# Patient Record
Sex: MALE | Race: OTHER | URBAN - METROPOLITAN AREA
[De-identification: names, ages, dates, MRNs, and addresses within clinical notes are randomized per-mention and may not be internally consistent; named-entity substitution may affect disease eponyms.]

---

## 2024-07-14 ENCOUNTER — INPATIENT (INPATIENT)
Facility: HOSPITAL | Age: 49
LOS: 2 days | Discharge: HOME | DRG: 301 | End: 2024-07-17
Attending: STUDENT IN AN ORGANIZED HEALTH CARE EDUCATION/TRAINING PROGRAM | Admitting: THORACIC SURGERY (CARDIOTHORACIC VASCULAR SURGERY)
Payer: COMMERCIAL

## 2024-07-14 VITALS
SYSTOLIC BLOOD PRESSURE: 128 MMHG | RESPIRATION RATE: 18 BRPM | OXYGEN SATURATION: 97 % | DIASTOLIC BLOOD PRESSURE: 88 MMHG | HEIGHT: 78 IN | WEIGHT: 251.55 LBS | HEART RATE: 90 BPM | TEMPERATURE: 98 F

## 2024-07-14 DIAGNOSIS — I48.91 UNSPECIFIED ATRIAL FIBRILLATION: ICD-10-CM

## 2024-07-14 DIAGNOSIS — I72.8 ANEURYSM OF OTHER SPECIFIED ARTERIES: ICD-10-CM

## 2024-07-14 DIAGNOSIS — I77.810 THORACIC AORTIC ECTASIA: ICD-10-CM

## 2024-07-14 DIAGNOSIS — Z29.9 ENCOUNTER FOR PROPHYLACTIC MEASURES, UNSPECIFIED: ICD-10-CM

## 2024-07-14 LAB
A1C WITH ESTIMATED AVERAGE GLUCOSE RESULT: 6.4 % — HIGH (ref 4–5.6)
ALBUMIN SERPL ELPH-MCNC: 3.4 G/DL — SIGNIFICANT CHANGE UP (ref 3.3–5)
ALP SERPL-CCNC: 87 U/L — SIGNIFICANT CHANGE UP (ref 40–120)
ALT FLD-CCNC: 11 U/L — SIGNIFICANT CHANGE UP (ref 10–45)
ANION GAP SERPL CALC-SCNC: 11 MMOL/L — SIGNIFICANT CHANGE UP (ref 5–17)
APPEARANCE UR: CLEAR — SIGNIFICANT CHANGE UP
APTT BLD: 28.1 SEC — SIGNIFICANT CHANGE UP (ref 24.5–35.6)
APTT BLD: 31.3 SEC — SIGNIFICANT CHANGE UP (ref 24.5–35.6)
AST SERPL-CCNC: 13 U/L — SIGNIFICANT CHANGE UP (ref 10–40)
BACTERIA # UR AUTO: NEGATIVE /HPF — SIGNIFICANT CHANGE UP
BASOPHILS # BLD AUTO: 0.01 K/UL — SIGNIFICANT CHANGE UP (ref 0–0.2)
BASOPHILS NFR BLD AUTO: 0.3 % — SIGNIFICANT CHANGE UP (ref 0–2)
BILIRUB SERPL-MCNC: 0.7 MG/DL — SIGNIFICANT CHANGE UP (ref 0.2–1.2)
BILIRUB UR-MCNC: NEGATIVE — SIGNIFICANT CHANGE UP
BLD GP AB SCN SERPL QL: NEGATIVE — SIGNIFICANT CHANGE UP
BUN SERPL-MCNC: 11 MG/DL — SIGNIFICANT CHANGE UP (ref 7–23)
CALCIUM SERPL-MCNC: 9.3 MG/DL — SIGNIFICANT CHANGE UP (ref 8.4–10.5)
CAST: 0 /LPF — SIGNIFICANT CHANGE UP (ref 0–4)
CHLORIDE SERPL-SCNC: 106 MMOL/L — SIGNIFICANT CHANGE UP (ref 96–108)
CO2 SERPL-SCNC: 21 MMOL/L — LOW (ref 22–31)
COLOR SPEC: YELLOW — SIGNIFICANT CHANGE UP
CREAT SERPL-MCNC: 0.81 MG/DL — SIGNIFICANT CHANGE UP (ref 0.5–1.3)
DIFF PNL FLD: NEGATIVE — SIGNIFICANT CHANGE UP
EGFR: 110 ML/MIN/1.73M2 — SIGNIFICANT CHANGE UP
EOSINOPHIL # BLD AUTO: 0.1 K/UL — SIGNIFICANT CHANGE UP (ref 0–0.5)
EOSINOPHIL NFR BLD AUTO: 2.5 % — SIGNIFICANT CHANGE UP (ref 0–6)
ESTIMATED AVERAGE GLUCOSE: 137 MG/DL — HIGH (ref 68–114)
GLUCOSE SERPL-MCNC: 89 MG/DL — SIGNIFICANT CHANGE UP (ref 70–99)
GLUCOSE UR QL: 100 MG/DL
HCT VFR BLD CALC: 39.3 % — SIGNIFICANT CHANGE UP (ref 39–50)
HCT VFR BLD CALC: 40.8 % — SIGNIFICANT CHANGE UP (ref 39–50)
HGB BLD-MCNC: 13.2 G/DL — SIGNIFICANT CHANGE UP (ref 13–17)
HGB BLD-MCNC: 13.5 G/DL — SIGNIFICANT CHANGE UP (ref 13–17)
INR BLD: 1.09 RATIO — SIGNIFICANT CHANGE UP (ref 0.85–1.18)
KETONES UR-MCNC: NEGATIVE MG/DL — SIGNIFICANT CHANGE UP
LEUKOCYTE ESTERASE UR-ACNC: NEGATIVE — SIGNIFICANT CHANGE UP
LYMPHOCYTES # BLD AUTO: 2.01 K/UL — SIGNIFICANT CHANGE UP (ref 1–3.3)
LYMPHOCYTES # BLD AUTO: 51.1 % — HIGH (ref 13–44)
MCHC RBC-ENTMCNC: 25.3 PG — LOW (ref 27–34)
MCHC RBC-ENTMCNC: 25.5 PG — LOW (ref 27–34)
MCHC RBC-ENTMCNC: 33.1 GM/DL — SIGNIFICANT CHANGE UP (ref 32–36)
MCHC RBC-ENTMCNC: 33.6 GM/DL — SIGNIFICANT CHANGE UP (ref 32–36)
MCV RBC AUTO: 75.9 FL — LOW (ref 80–100)
MCV RBC AUTO: 76.4 FL — LOW (ref 80–100)
MONOCYTES # BLD AUTO: 0.46 K/UL — SIGNIFICANT CHANGE UP (ref 0–0.9)
MONOCYTES NFR BLD AUTO: 11.7 % — SIGNIFICANT CHANGE UP (ref 2–14)
MRSA PCR RESULT.: SIGNIFICANT CHANGE UP
NEUTROPHILS # BLD AUTO: 1.35 K/UL — LOW (ref 1.8–7.4)
NEUTROPHILS NFR BLD AUTO: 34.4 % — LOW (ref 43–77)
NITRITE UR-MCNC: NEGATIVE — SIGNIFICANT CHANGE UP
NRBC # BLD: 0 /100 WBCS — SIGNIFICANT CHANGE UP (ref 0–0)
NRBC # BLD: 0 /100 WBCS — SIGNIFICANT CHANGE UP (ref 0–0)
NT-PROBNP SERPL-SCNC: 382 PG/ML — HIGH (ref 0–300)
PH UR: 7 — SIGNIFICANT CHANGE UP (ref 5–8)
PLATELET # BLD AUTO: 168 K/UL — SIGNIFICANT CHANGE UP (ref 150–400)
PLATELET # BLD AUTO: SIGNIFICANT CHANGE UP K/UL (ref 150–400)
POTASSIUM SERPL-MCNC: 4.2 MMOL/L — SIGNIFICANT CHANGE UP (ref 3.5–5.3)
POTASSIUM SERPL-SCNC: 4.2 MMOL/L — SIGNIFICANT CHANGE UP (ref 3.5–5.3)
PROT SERPL-MCNC: 6.7 G/DL — SIGNIFICANT CHANGE UP (ref 6–8.3)
PROT UR-MCNC: 30 MG/DL
PROTHROM AB SERPL-ACNC: 12 SEC — SIGNIFICANT CHANGE UP (ref 9.5–13)
RBC # BLD: 5.18 M/UL — SIGNIFICANT CHANGE UP (ref 4.2–5.8)
RBC # BLD: 5.34 M/UL — SIGNIFICANT CHANGE UP (ref 4.2–5.8)
RBC # FLD: 14 % — SIGNIFICANT CHANGE UP (ref 10.3–14.5)
RBC # FLD: 14.2 % — SIGNIFICANT CHANGE UP (ref 10.3–14.5)
RBC CASTS # UR COMP ASSIST: 1 /HPF — SIGNIFICANT CHANGE UP (ref 0–4)
RH IG SCN BLD-IMP: POSITIVE — SIGNIFICANT CHANGE UP
S AUREUS DNA NOSE QL NAA+PROBE: SIGNIFICANT CHANGE UP
SODIUM SERPL-SCNC: 138 MMOL/L — SIGNIFICANT CHANGE UP (ref 135–145)
SP GR SPEC: 1.01 — SIGNIFICANT CHANGE UP (ref 1–1.03)
SQUAMOUS # UR AUTO: 2 /HPF — SIGNIFICANT CHANGE UP (ref 0–5)
TSH SERPL-MCNC: <0.01 UIU/ML — LOW (ref 0.27–4.2)
UROBILINOGEN FLD QL: 1 MG/DL — SIGNIFICANT CHANGE UP (ref 0.2–1)
WBC # BLD: 3.66 K/UL — LOW (ref 3.8–10.5)
WBC # BLD: 3.93 K/UL — SIGNIFICANT CHANGE UP (ref 3.8–10.5)
WBC # FLD AUTO: 3.66 K/UL — LOW (ref 3.8–10.5)
WBC # FLD AUTO: 3.93 K/UL — SIGNIFICANT CHANGE UP (ref 3.8–10.5)
WBC UR QL: 2 /HPF — SIGNIFICANT CHANGE UP (ref 0–5)

## 2024-07-14 PROCEDURE — 93010 ELECTROCARDIOGRAM REPORT: CPT

## 2024-07-14 PROCEDURE — 71046 X-RAY EXAM CHEST 2 VIEWS: CPT | Mod: 26

## 2024-07-14 PROCEDURE — ZZZZZ: CPT

## 2024-07-14 PROCEDURE — 99223 1ST HOSP IP/OBS HIGH 75: CPT

## 2024-07-14 PROCEDURE — 74174 CTA ABD&PLVS W/CONTRAST: CPT | Mod: 26

## 2024-07-14 PROCEDURE — 93306 TTE W/DOPPLER COMPLETE: CPT | Mod: 26

## 2024-07-14 PROCEDURE — 71275 CT ANGIOGRAPHY CHEST: CPT | Mod: 26

## 2024-07-14 RX ORDER — DIPHENHYDRAMINE HCL 12.5MG/5ML
25 ELIXIR ORAL EVERY 4 HOURS
Refills: 0 | Status: DISCONTINUED | OUTPATIENT
Start: 2024-07-14 | End: 2024-07-17

## 2024-07-14 RX ORDER — PANTOPRAZOLE SODIUM 40 MG/10ML
40 INJECTION, POWDER, FOR SOLUTION INTRAVENOUS
Refills: 0 | Status: DISCONTINUED | OUTPATIENT
Start: 2024-07-14 | End: 2024-07-17

## 2024-07-14 RX ORDER — SODIUM CHLORIDE 0.9 % (FLUSH) 0.9 %
3 SYRINGE (ML) INJECTION EVERY 8 HOURS
Refills: 0 | Status: DISCONTINUED | OUTPATIENT
Start: 2024-07-14 | End: 2024-07-17

## 2024-07-14 RX ORDER — HEPARIN SODIUM 50 [USP'U]/ML
800 INJECTION, SOLUTION INTRAVENOUS
Qty: 25000 | Refills: 0 | Status: DISCONTINUED | OUTPATIENT
Start: 2024-07-14 | End: 2024-07-17

## 2024-07-14 RX ORDER — METOPROLOL TARTRATE 50 MG
25 TABLET ORAL EVERY 12 HOURS
Refills: 0 | Status: DISCONTINUED | OUTPATIENT
Start: 2024-07-14 | End: 2024-07-17

## 2024-07-14 RX ORDER — HEPARIN SODIUM 50 [USP'U]/ML
5000 INJECTION, SOLUTION INTRAVENOUS EVERY 8 HOURS
Refills: 0 | Status: DISCONTINUED | OUTPATIENT
Start: 2024-07-14 | End: 2024-07-14

## 2024-07-14 RX ORDER — ASPIRIN 325 MG/1
81 TABLET, FILM COATED ORAL DAILY
Refills: 0 | Status: DISCONTINUED | OUTPATIENT
Start: 2024-07-14 | End: 2024-07-17

## 2024-07-14 RX ADMIN — HEPARIN SODIUM 8 UNIT(S)/HR: 50 INJECTION, SOLUTION INTRAVENOUS at 09:56

## 2024-07-14 RX ADMIN — Medication 25 MILLIGRAM(S): at 21:29

## 2024-07-14 RX ADMIN — Medication 3 MILLILITER(S): at 05:49

## 2024-07-14 RX ADMIN — PANTOPRAZOLE SODIUM 40 MILLIGRAM(S): 40 INJECTION, POWDER, FOR SOLUTION INTRAVENOUS at 05:52

## 2024-07-14 RX ADMIN — ASPIRIN 81 MILLIGRAM(S): 325 TABLET, FILM COATED ORAL at 09:56

## 2024-07-14 RX ADMIN — Medication 3 MILLILITER(S): at 09:31

## 2024-07-14 RX ADMIN — Medication 25 MILLIGRAM(S): at 06:39

## 2024-07-14 RX ADMIN — Medication 3 MILLILITER(S): at 21:51

## 2024-07-14 NOTE — PROGRESS NOTE ADULT - SUBJECTIVE AND OBJECTIVE BOX
Subjective " hello I am CP free"    VITAL SIGNS    Telemetry:  AFIB  70-80     Vital Signs Last 24 Hrs  T(C): 36.8 (24 @ 04:06), Max: 36.8 (24 @ 04:06)  T(F): 98.3 (24 @ 04:06), Max: 98.3 (24 @ 04:06)  HR: 90 (24 @ 04:06) (90 - 90)  BP: 128/88 (24 @ 04:06) (128/88 - 128/88)  RR: 18 (24 @ 04:06) (18 - 18)  SpO2: 97% (24 @ 04:06) (97% - 97%)            @ 07:01  -   @ 11:21  --------------------------------------------------------  IN: 136 mL / OUT: 600 mL / NET: -464 mL    Daily Height in cm: 198.12 (2024 04:06)    Daily Weight in k.1 (2024 04:06)    MEDICATIONS  (STANDING):  aspirin enteric coated 81 milliGRAM(s) Oral daily  heparin  Infusion 800 Unit(s)/Hr (8 mL/Hr) IV Continuous <Continuous>  metoprolol tartrate 25 milliGRAM(s) Oral every 12 hours  pantoprazole    Tablet 40 milliGRAM(s) Oral before breakfast  sodium chloride 0.9% lock flush 3 milliLiter(s) IV Push every 8 hours    MEDICATIONS  (PRN):                        13.5   3.93  )-----------( 168      ( 2024 06:35 )             40.8         138  |  106  |  11  ----------------------------<  89  4.2   |  21<L>  |  0.81    Ca    9.3      2024 06:31    TPro  6.7  /  Alb  3.4  /  TBili  0.7  /  DBili  x   /  AST  13  /  ALT  11  /  AlkPhos  87  07-14    < from: TTE W or WO Ultrasound Enhancing Agent (24 @ 06:47) >  1. Left ventricular cavity is normal in size. Left ventricular wall thickness is normal. Left ventricular systolic function is normal with an ejection fraction of 59 % by Nichole's method of disks. There are no regional wall motion abnormalities seen.   2. Aortic root at the sinuses of Valsalva is aneurysmal, measuring 5.10 cm (indexed 2.05 cm/m²). Ascending aorta diameter is aneurysmal, measuring 4.80 cm (indexed 1.93 cm/m²).   3. Trace aortic regurgitation.    __________________________________________________________    < end of copied text >                            PHYSICAL EXAM        Neurology: alert and oriented x 3, nonfocal, no gross deficits    CV : s1 IRRR  Lungs: B/l CTA on room air     Abdomen: soft, nontender, nondistended, positive bowel sounds,  + Bm     :voids               Extremities:   war4m well perfused equal strength throughout    B/ll e +DP no calf tenderness                                        Physical Therapy Rec:   Home  [  ]   Home w/ PT  [  ]  Rehab  [  ]    Discussed with Cardiothoracic Team at AM rounds. Subjective " hello I am CP free"    VITAL SIGNS    Telemetry:  AFIB  70-80     Vital Signs Last 24 Hrs  T(C): 36.8 (24 @ 04:06), Max: 36.8 (24 @ 04:06)  T(F): 98.3 (24 @ 04:06), Max: 98.3 (24 @ 04:06)  HR: 90 (24 @ 04:06) (90 - 90)  BP: 128/88 (24 @ 04:06) (128/88 - 128/88)  RR: 18 (24 @ 04:06) (18 - 18)  SpO2: 97% (24 @ 04:06) (97% - 97%)            @ 07:01  -   @ 11:21  --------------------------------------------------------  IN: 136 mL / OUT: 600 mL / NET: -464 mL    Daily Height in cm: 198.12 (2024 04:06)    Daily Weight in k.1 (2024 04:06)    MEDICATIONS  (STANDING):  aspirin enteric coated 81 milliGRAM(s) Oral daily  heparin  Infusion 800 Unit(s)/Hr (8 mL/Hr) IV Continuous <Continuous>  metoprolol tartrate 25 milliGRAM(s) Oral every 12 hours  pantoprazole    Tablet 40 milliGRAM(s) Oral before breakfast  sodium chloride 0.9% lock flush 3 milliLiter(s) IV Push every 8 hours    MEDICATIONS  (PRN):                        13.5   3.93  )-----------( 168      ( 2024 06:35 )             40.8         138  |  106  |  11  ----------------------------<  89  4.2   |  21<L>  |  0.81    Ca    9.3      2024 06:31    TPro  6.7  /  Alb  3.4  /  TBili  0.7  /  DBili  x   /  AST  13  /  ALT  11  /  AlkPhos  87  07-14    < from: TTE W or WO Ultrasound Enhancing Agent (24 @ 06:47) >  1. Left ventricular cavity is normal in size. Left ventricular wall thickness is normal. Left ventricular systolic function is normal with an ejection fraction of 59 % by Nichole's method of disks. There are no regional wall motion abnormalities seen.   2. Aortic root at the sinuses of Valsalva is aneurysmal, measuring 5.10 cm (indexed 2.05 cm/m²). Ascending aorta diameter is aneurysmal, measuring 4.80 cm (indexed 1.93 cm/m²).   3. Trace aortic regurgitation.    __________________________________________________________    < end of copied text >                            PHYSICAL EXAM        Neurology: alert and oriented x 3, nonfocal, no gross deficits    CV : s1 IRRR  Lungs: B/l CTA on room air     Abdomen: soft, nontender, nondistended, positive bowel sounds,  + Bm     :voids               Extremities:   warm well perfused equal strength throughout    B/ll e +DP no calf tenderness                                        Physical Therapy Rec:   Home  [  ]   Home w/ PT  [  ]  Rehab  [  ]    Discussed with Cardiothoracic Team at AM rounds.

## 2024-07-14 NOTE — H&P ADULT - ASSESSMENT
48 year old male with pmh of known aortic root dilatation who  presented to outside hospital for management of headache and  found to have aortic dilatation increased to 5.6 cm from 4.8 in 2019 and new onset atrial fibrillation.  Transferred for cardiac surgery evaluation.

## 2024-07-14 NOTE — PROGRESS NOTE ADULT - PROBLEM SELECTOR PLAN 1
admit to Dr Serrano   telemetry monitor  metorpolol 25 bid  cardiac surgery labs  ECHO- 2. Aortic root at the sinuses of Valsalva is aneurysmal, measuring 5.10 cm (indexed 2.05 cm/m²). Ascending aorta diameter is aneurysmal, measuring 4.80 cm   blood pressure management admit to Dr Serrano   telemetry monitor  metorpolol 25 bid  cardiac surgery labs  7/14 ECHO- 2. Aortic root at the sinuses of Valsalva is aneurysmal, measuring 5.10 cm (indexed 2.05 cm/m²). Ascending aorta diameter is aneurysmal, measuring 4.80 cm   7/14 CTA chest  completed  blood pressure management  OR date TBD

## 2024-07-14 NOTE — PATIENT PROFILE ADULT - NSPRESCRALCAMT_GEN_A_NUR
Chief Complaint   Patient presents with   • DDD degenerative disc disease     Refill on gabapentin and would like a steroid shot     Subjective   Juan Antonio Carrington is a 46 y.o. male.           Presents with low back pain and needing steroid injection     Back Pain  This is a recurrent problem. The current episode started more than 1 year ago. The problem occurs intermittently. The problem has been waxing and waning since onset. The pain is present in the sacro-iliac. The quality of the pain is described as cramping, shooting and stabbing. The pain radiates to the left foot, left thigh and left knee. The pain is at a severity of 8/10. The pain is severe. The pain is the same all the time. The symptoms are aggravated by bending and position. Stiffness is present all day. Associated symptoms include paresis and paresthesias. Pertinent negatives include no abdominal pain, bladder incontinence, bowel incontinence, chest pain, dysuria, fever, headaches, leg pain, numbness, pelvic pain, perianal numbness, tingling, weakness or weight loss. Risk factors include recent trauma and sedentary lifestyle. He has tried analgesics, heat, ice, NSAIDs, walking and home exercises for the symptoms. The treatment provided mild relief.   Fatigue  This is a recurrent problem. The current episode started 1 to 4 weeks ago. The problem occurs every several days. The problem has been gradually worsening. Associated symptoms include arthralgias, fatigue, joint swelling and myalgias. Pertinent negatives include no abdominal pain, chest pain, chills, coughing, fever, headaches, neck pain, numbness, rash, sore throat, swollen glands, urinary symptoms, vertigo, visual change or weakness. Treatments tried: testosterone and it helps  The treatment provided moderate relief.        The following portions of the patient's history were reviewed and updated as appropriate: allergies, current medications, past social history and problem list.    Review  "of Systems   Constitutional: Positive for activity change and fatigue. Negative for appetite change, chills, fever and weight loss.   HENT: Negative for drooling, ear discharge, ear pain, facial swelling, hearing loss and sore throat.    Eyes: Negative.  Negative for photophobia, pain, discharge, redness and visual disturbance.   Respiratory: Negative.  Negative for apnea, cough, shortness of breath, wheezing and stridor.    Cardiovascular: Negative for chest pain, palpitations and leg swelling.   Gastrointestinal: Negative for abdominal distention, abdominal pain, anal bleeding, blood in stool, bowel incontinence and constipation.   Endocrine: Negative.  Negative for cold intolerance, heat intolerance, polydipsia, polyphagia and polyuria.   Genitourinary: Negative.  Negative for bladder incontinence, difficulty urinating, dysuria and pelvic pain.   Musculoskeletal: Positive for arthralgias, back pain, gait problem, joint swelling and myalgias. Negative for neck pain and neck stiffness.        Right ankle pain , low back pain     Right ankle pain    Skin: Negative.  Negative for rash.   Allergic/Immunologic: Negative.  Negative for environmental allergies, food allergies and immunocompromised state.   Neurological: Positive for paresthesias. Negative for dizziness, vertigo, tingling, tremors, seizures, syncope, facial asymmetry, speech difficulty, weakness, light-headedness, numbness and headaches.   Hematological: Negative.  Negative for adenopathy. Does not bruise/bleed easily.   Psychiatric/Behavioral: Negative.  Negative for agitation, behavioral problems, confusion, decreased concentration, dysphoric mood and hallucinations. The patient is not hyperactive.        Objective   /80   Pulse 84   Ht 182.9 cm (72\")   Wt 99.8 kg (220 lb)   SpO2 98%   BMI 29.84 kg/m²   Physical Exam  Vitals and nursing note reviewed.   Constitutional:       General: He is not in acute distress.     Appearance: Normal " appearance. He is not ill-appearing, toxic-appearing or diaphoretic.   HENT:      Head: Normocephalic.      Right Ear: There is no impacted cerumen.      Left Ear: There is no impacted cerumen.      Nose: Nose normal. No congestion or rhinorrhea.      Mouth/Throat:      Mouth: Mucous membranes are moist.      Pharynx: No oropharyngeal exudate or posterior oropharyngeal erythema.   Eyes:      Pupils: Pupils are equal, round, and reactive to light.   Neck:      Vascular: No carotid bruit.   Cardiovascular:      Rate and Rhythm: Normal rate.      Heart sounds: No murmur heard.    No friction rub. No gallop.   Pulmonary:      Effort: Pulmonary effort is normal. No respiratory distress.      Breath sounds: No stridor. No wheezing, rhonchi or rales.   Chest:      Chest wall: No tenderness.   Abdominal:      General: Abdomen is flat. There is no distension.      Palpations: There is no mass.      Tenderness: There is no abdominal tenderness. There is no right CVA tenderness, left CVA tenderness, guarding or rebound.      Hernia: No hernia is present.   Musculoskeletal:         General: Tenderness present. No swelling, deformity or signs of injury. Normal range of motion.      Cervical back: Normal range of motion. No rigidity or tenderness.      Lumbar back: Spasms, tenderness and bony tenderness present.      Right lower leg: No edema.      Left lower leg: No edema.        Legs:    Lymphadenopathy:      Cervical: No cervical adenopathy.   Skin:     General: Skin is warm.      Coloration: Skin is not jaundiced or pale.      Findings: No bruising, erythema, lesion or rash.   Neurological:      General: No focal deficit present.      Mental Status: He is alert and oriented to person, place, and time.      Cranial Nerves: No cranial nerve deficit.      Sensory: No sensory deficit.      Motor: No weakness.      Coordination: Coordination normal.      Gait: Gait normal.      Deep Tendon Reflexes: Reflexes normal.   Psychiatric:          Mood and Affect: Mood normal.         Behavior: Behavior normal.              Assessment & Plan     Problems Addressed this Visit        Neuro    DDD (degenerative disc disease), lumbar - Primary    Lumbar radiculopathy    Relevant Medications    gabapentin (NEURONTIN) 300 MG capsule       Symptoms and Signs    Malaise and fatigue    Relevant Medications    gabapentin (NEURONTIN) 300 MG capsule      Diagnoses       Codes Comments    DDD (degenerative disc disease), lumbar    -  Primary ICD-10-CM: M51.36  ICD-9-CM: 722.52     Lumbar radiculopathy     ICD-10-CM: M54.16  ICD-9-CM: 724.4     Malaise and fatigue     ICD-10-CM: R53.81, R53.83  ICD-9-CM: 780.79            New Medications Ordered This Visit   Medications   • gabapentin (NEURONTIN) 300 MG capsule     Sig: Take 1 capsule by mouth 3 (Three) Times a Day.     Dispense:  90 capsule     Refill:  2     Current Outpatient Medications on File Prior to Visit   Medication Sig Dispense Refill   • baclofen (LIORESAL) 10 MG tablet Take 1 tablet by mouth 3 (Three) Times a Day. 90 tablet 2   • HYDROcodone-acetaminophen (NORCO) 7.5-325 MG per tablet Take 1 tablet by mouth Every 4 (Four) Hours As Needed for Moderate Pain. 24 tablet 0   • ibuprofen (ADVIL,MOTRIN) 800 MG tablet Take 1 tablet by mouth Every 8 (Eight) Hours As Needed for Moderate Pain . 90 tablet 5   • pantoprazole (Protonix) 40 MG EC tablet Take 1 tablet by mouth Daily. 30 tablet 11   • [DISCONTINUED] gabapentin (NEURONTIN) 300 MG capsule Take 1 capsule by mouth 3 (Three) Times a Day. 90 capsule 2   • [DISCONTINUED] HYDROcodone-acetaminophen (Norco)  MG per tablet Take 1 tablet by mouth Every 2 (Two) Hours As Needed for Moderate Pain . 180 tablet 0   • [DISCONTINUED] traMADol (ULTRAM) 50 MG tablet Take 1 tablet by mouth Every 6 (Six) Hours As Needed for Moderate Pain . 60 tablet 0     No current facility-administered medications on file prior to visit.       15 minutes   Follow Up   Return in about  3 months (around 9/9/2022) for Next scheduled follow up.     Patient understands the risks associated with this controlled medication, including tolerance and addiction.  he also agrees to only obtain this medication from me, and not from a another provider, unless that provider is covering for me in my absence.  he also agrees to be compliant in dosing, and not self adjust the dose of medication.  A signed controlled substance agreement is on file, and he has received a controlled substance education sheet at this a previous visit.  he has also signed a consent for treatment with a controlled substance as per [facility] policy. DAVID was obtained.     see back in 3 months, meds as directed, diet discussed kenalog today -continue other meds as directed   3 or 4

## 2024-07-14 NOTE — PROGRESS NOTE ADULT - ASSESSMENT
This is a 48 year old male with pmh of known aortic root dilatation who  presented to outside hospital for management of headache and  found to have aortic dilatation increased to 5.6 cm from 4.8 in 2019 and new onset atrial fibrillation.  Transferred for cardiac surgery evaluation.     7/14 VSS CP free in afib heparin gtt for AC echo completed this am       This is a 48 year old male with pmh of known aortic root dilatation who  presented to outside hospital for management of headache and  found to have aortic dilatation increased to 5.6 cm from 4.8 in 2019 and new onset atrial fibrillation.  Transferred for cardiac surgery evaluation.     7/14 VSS CP free in afib heparin gtt for AC echo/  CTA completed today  will need cardiac cath OR date TBD

## 2024-07-14 NOTE — H&P ADULT - HISTORY OF PRESENT ILLNESS
male with pmh of migraine presented to outside hospital for management of headache and incidentally found to have aortic dilatation to 5.6 cm and new onset atrial fibrillation.  Transferred to CTS service at [facility] for continued management.  Denies fever, chills, syncope, chest pain, abdominal pain, back pain. n/v/d, dysuria 48 year old male with pmh of known aortic root dilatation 4.8 with prior annual surveillance and family history of death due to aortic dissection and MI (mother age 45)  presented to outside hospital for management of headache and incidentally found to have aortic dilatation to 5.6 cm and new onset atrial fibrillation.  Transferred to CTS service at [facility] for continued management.  Denies fever, chills, syncope, chest pain, abdominal pain, back pain. n/v/d, dysuria

## 2024-07-14 NOTE — H&P ADULT - NSICDXFAMILYHX_GEN_ALL_CORE_FT
FAMILY HISTORY:  Mother  Still living? No  Family history of myocardial infarction, Age at diagnosis: Age Unknown    Uncle  Still living? No  Family history of aortic dissection, Age at diagnosis: Age Unknown

## 2024-07-14 NOTE — H&P ADULT - SOCIAL HISTORY: SUBSTANCE USE
During pre-op assessment pt c/o cough for over a month, SOB, breath sounds diminished. Pt concerned about success of surgery with his continued cough. Notified CRNA and MD prior to procedure. MD came to talk to patient and ok to proceed with surgery. Pt verbalized understanding and is okay with proceeding today.    Autumn Diaz    
daily marijuana

## 2024-07-14 NOTE — H&P ADULT - NSHPPHYSICALEXAM_GEN_ALL_CORE
General: WN/WD NAD  Neurology: A&Ox3, nonfocal, ORO x 4  Head:  Normocephalic, atraumatic  ENT:  Mucosa moist, no ulcerations, glasses  Neck:  Supple, no sinuses or palpable masses  Lymphatic:  No palpable cervical, supraclavicular, axillary or inguinal adenopathy  Respiratory: CTA B/L  CV: RRR, S1S2, no murmur  Abdominal: Soft, NT, ND no palpable mass  MSK: No edema, + peripheral pulses, FROM all 4 extremity  Skin: Warm dry intact  vascular access: peripheral IV

## 2024-07-15 DIAGNOSIS — E05.90 THYROTOXICOSIS, UNSPECIFIED WITHOUT THYROTOXIC CRISIS OR STORM: ICD-10-CM

## 2024-07-15 LAB
ALBUMIN SERPL ELPH-MCNC: 3.5 G/DL — SIGNIFICANT CHANGE UP (ref 3.3–5)
ALP SERPL-CCNC: 87 U/L — SIGNIFICANT CHANGE UP (ref 40–120)
ALT FLD-CCNC: 11 U/L — SIGNIFICANT CHANGE UP (ref 10–45)
ANION GAP SERPL CALC-SCNC: 8 MMOL/L — SIGNIFICANT CHANGE UP (ref 5–17)
APTT BLD: 45.4 SEC — HIGH (ref 24.5–35.6)
APTT BLD: 46.4 SEC — HIGH (ref 24.5–35.6)
AST SERPL-CCNC: 12 U/L — SIGNIFICANT CHANGE UP (ref 10–40)
BILIRUB SERPL-MCNC: 0.9 MG/DL — SIGNIFICANT CHANGE UP (ref 0.2–1.2)
BUN SERPL-MCNC: 10 MG/DL — SIGNIFICANT CHANGE UP (ref 7–23)
CALCIUM SERPL-MCNC: 9.1 MG/DL — SIGNIFICANT CHANGE UP (ref 8.4–10.5)
CHLORIDE SERPL-SCNC: 105 MMOL/L — SIGNIFICANT CHANGE UP (ref 96–108)
CO2 SERPL-SCNC: 26 MMOL/L — SIGNIFICANT CHANGE UP (ref 22–31)
CREAT SERPL-MCNC: 0.86 MG/DL — SIGNIFICANT CHANGE UP (ref 0.5–1.3)
EGFR: 108 ML/MIN/1.73M2 — SIGNIFICANT CHANGE UP
GLUCOSE SERPL-MCNC: 111 MG/DL — HIGH (ref 70–99)
HCT VFR BLD CALC: 41.1 % — SIGNIFICANT CHANGE UP (ref 39–50)
HGB BLD-MCNC: 13.6 G/DL — SIGNIFICANT CHANGE UP (ref 13–17)
INR BLD: 1.08 RATIO — SIGNIFICANT CHANGE UP (ref 0.85–1.18)
MCHC RBC-ENTMCNC: 24.9 PG — LOW (ref 27–34)
MCHC RBC-ENTMCNC: 33.1 GM/DL — SIGNIFICANT CHANGE UP (ref 32–36)
MCV RBC AUTO: 75.1 FL — LOW (ref 80–100)
NRBC # BLD: 0 /100 WBCS — SIGNIFICANT CHANGE UP (ref 0–0)
PLATELET # BLD AUTO: 163 K/UL — SIGNIFICANT CHANGE UP (ref 150–400)
POTASSIUM SERPL-MCNC: 4 MMOL/L — SIGNIFICANT CHANGE UP (ref 3.5–5.3)
POTASSIUM SERPL-SCNC: 4 MMOL/L — SIGNIFICANT CHANGE UP (ref 3.5–5.3)
PROT SERPL-MCNC: 6.6 G/DL — SIGNIFICANT CHANGE UP (ref 6–8.3)
PROTHROM AB SERPL-ACNC: 11.3 SEC — SIGNIFICANT CHANGE UP (ref 9.5–13)
RBC # BLD: 5.47 M/UL — SIGNIFICANT CHANGE UP (ref 4.2–5.8)
RBC # FLD: 14 % — SIGNIFICANT CHANGE UP (ref 10.3–14.5)
SODIUM SERPL-SCNC: 139 MMOL/L — SIGNIFICANT CHANGE UP (ref 135–145)
WBC # BLD: 4.55 K/UL — SIGNIFICANT CHANGE UP (ref 3.8–10.5)
WBC # FLD AUTO: 4.55 K/UL — SIGNIFICANT CHANGE UP (ref 3.8–10.5)

## 2024-07-15 PROCEDURE — 99232 SBSQ HOSP IP/OBS MODERATE 35: CPT

## 2024-07-15 RX ORDER — ACETAMINOPHEN 325 MG
1000 TABLET ORAL ONCE
Refills: 0 | Status: COMPLETED | OUTPATIENT
Start: 2024-07-15 | End: 2024-07-15

## 2024-07-15 RX ADMIN — Medication 3 MILLILITER(S): at 05:16

## 2024-07-15 RX ADMIN — PANTOPRAZOLE SODIUM 40 MILLIGRAM(S): 40 INJECTION, POWDER, FOR SOLUTION INTRAVENOUS at 05:20

## 2024-07-15 RX ADMIN — Medication 25 MILLIGRAM(S): at 21:39

## 2024-07-15 RX ADMIN — ASPIRIN 81 MILLIGRAM(S): 325 TABLET, FILM COATED ORAL at 10:53

## 2024-07-15 RX ADMIN — Medication 1000 MILLIGRAM(S): at 12:04

## 2024-07-15 RX ADMIN — Medication 400 MILLIGRAM(S): at 11:41

## 2024-07-15 RX ADMIN — HEPARIN SODIUM 10 UNIT(S)/HR: 50 INJECTION, SOLUTION INTRAVENOUS at 02:07

## 2024-07-15 RX ADMIN — Medication 3 MILLILITER(S): at 14:35

## 2024-07-15 RX ADMIN — HEPARIN SODIUM 10 UNIT(S)/HR: 50 INJECTION, SOLUTION INTRAVENOUS at 10:51

## 2024-07-15 RX ADMIN — Medication 25 MILLIGRAM(S): at 10:54

## 2024-07-15 RX ADMIN — Medication 3 MILLILITER(S): at 21:31

## 2024-07-15 NOTE — PROGRESS NOTE ADULT - SUBJECTIVE AND OBJECTIVE BOX
VITAL SIGNS    Telemetry:  AFib 50-80  Vital Signs Last 24 Hrs  T(C): 36.7 (07-15-24 @ 04:52), Max: 36.8 (24 @ 21:07)  T(F): 98.1 (07-15-24 @ 04:52), Max: 98.2 (24 @ 21:07)  HR: 87 (07-15-24 @ 10:10) (72 - 87)  BP: 115/69 (07-15-24 @ 10:10) (100/66 - 115/69)  RR: 18 (07-15-24 @ 10:10) (18 - 18)  SpO2: 96% (07-15-24 @ 10:10) (95% - 98%)             @ 07:01  -  07-15 @ 07:00  --------------------------------------------------------  IN: 1194 mL / OUT: 2950 mL / NET: -1756 mL    07-15 @ 07:01  -  07-15 @ 16:22  --------------------------------------------------------  IN: 360 mL / OUT: 1700 mL / NET: -1340 mL      Daily Weight in k (15 Jul 2024 11:05)  Admit Wt: Drug Dosing Weight  Height (cm): 198.1 (2024 04:06)  Weight (kg): 114.1 (2024 04:06)  BMI (kg/m2): 29.1 (2024 04:06)  BSA (m2): 2.49 (2024 04:06)    Bilirubin Total: 0.9 mg/dL (07-15 @ 01:39)    CAPILLARY BLOOD GLUCOSE              MEDICATIONS  aspirin enteric coated 81 milliGRAM(s) Oral daily  diphenhydrAMINE 25 milliGRAM(s) Oral every 4 hours PRN  heparin  Infusion 800 Unit(s)/Hr IV Continuous <Continuous>  metoprolol tartrate 25 milliGRAM(s) Oral every 12 hours  pantoprazole    Tablet 40 milliGRAM(s) Oral before breakfast  sodium chloride 0.9% lock flush 3 milliLiter(s) IV Push every 8 hours      >>> <<<  PHYSICAL EXAM  Subjective: c/o of headache and right upper back pain  Neurology: alert and oriented x 3, nonfocal, no gross deficits  CV :s1s2 irreg  Lungs: CTA  Abdomen: soft, NT,ND, (+ )BM  :  voiding  Extremities:  -c/c/e   right interscapular pain, tenderness to rhomboid     LABS  07-15    139  |  105  |  10  ----------------------------<  111<H>  4.0   |  26  |  0.86    Ca    9.1      15 Jul 2024 01:39    TPro  6.6  /  Alb  3.5  /  TBili  0.9  /  DBili  x   /  AST  12  /  ALT  11  /  AlkPhos  87  07-15                                 13.6   4.55  )-----------( 163      ( 15 Jul 2024 01:39 )             41.1          PT/INR - ( 15 Jul 2024 01:39 )   PT: 11.3 sec;   INR: 1.08 ratio         PTT - ( 15 Jul 2024 09:53 )  PTT:45.4 sec       PAST MEDICAL & SURGICAL HISTORY:  Dilated aortic root      Marijuana use      No significant past surgical history

## 2024-07-15 NOTE — PROGRESS NOTE ADULT - ASSESSMENT
This is a 48 year old male with pmh of known aortic root dilatation who  presented to outside hospital for management of headache and  found to have aortic dilatation increased to 5.6 cm from 4.8 in 2019 and new onset atrial fibrillation.  Transferred for cardiac surgery evaluation.     7/14 VSS CP free in afib heparin gtt for AC echo/  CTA completed today  will need cardiac cath OR date TBD  7/15 CT CORS Ao root gated coronal and sagitals to be reconstructed off of gated CT pending. Endocrinology consulted for Graves Disease work up. Ultrasound thyroid. MRA head to evaluate patient complaint of HA and vision changes. Heparin ggt for afib  Coronary cath deferred for now. BP controlled

## 2024-07-15 NOTE — CONSULT NOTE ADULT - NS ATTEND AMEND GEN_ALL_CORE FT
Chart, labs, vitals, radiology reviewed. Above H&P reviewed and edited where appropriate. Agree with history and physical exam. Agree with assessment and plan. I reviewed the overnight course of events and discussed the care with the patient/ family. All the decisions in assessment and plan are made by me

## 2024-07-15 NOTE — CONSULT NOTE ADULT - ASSESSMENT
48 year old male with pmh of known aortic root dilatation who  presented to outside hospital for management of headache and  found to have aortic dilatation increased to 5.6 cm from 4.8 in 2019 and new onset atrial fibrillation.  Found to have suppressed TSH levels     Assessment  Hyperthyroidism: TSH suppressed, having tachycardia, no thyroid dz history, Full TFT panel pending Thyroid AB pending.    Aortic root dilation: on medications, CT surgery eval, monitored.  HTN: on antihypertensive medications, monitored, asymptomatic.      Discussed plan and management wit Dr Tammy Munoz MD  Cell: [phone]  Office: [phone]                48 year old male with pmh of known aortic root dilatation who  presented to outside hospital for management of headache and  found to have aortic dilatation increased to 5.6 cm from 4.8 in 2019 and new onset atrial fibrillation.  Found to have suppressed TSH levels       Assessment  Hyperthyroidism: TSH suppressed, having tachycardia, no thyroid dz history, Full TFT panel pending Thyroid AB pending.    Aortic root dilation: on medications, CT surgery eval, monitored.  HTN: on antihypertensive medications, monitored, asymptomatic.      Discussed plan and management wit Dr Tammy Munoz MD  Cell: [phone]  Office: [phone]

## 2024-07-15 NOTE — PROGRESS NOTE ADULT - PROBLEM SELECTOR PLAN 1
admit to Dr Serrano   telemetry monitor  metorpolol 25 bid  cardiac surgery labs  7/14 ECHO- 2. Aortic root at the sinuses of Valsalva is aneurysmal, measuring 5.10 cm (indexed 2.05 cm/m²). Ascending aorta diameter is aneurysmal, measuring 4.80 cm   7/14 CTA chest  completed  blood pressure management  OR date TBD

## 2024-07-15 NOTE — CONSULT NOTE ADULT - PROBLEM SELECTOR RECOMMENDATION 9
Will get full thyroid panel  Thyroid US  Thyroid AB pending   Suggest to repeat thyroid function test in 4-6 weeks. Outpatient follow up.

## 2024-07-15 NOTE — CONSULT NOTE ADULT - SUBJECTIVE AND OBJECTIVE BOX
HPI:  48 year old male with pmh of known aortic root dilatation 4.8 with prior annual surveillance and family history of death due to aortic dissection and MI (mother age 45)  presented to outside hospital for management of headache and incidentally found to have aortic dilatation to 5.6 cm and new onset atrial fibrillation.  Transferred to CTS service at [facility] for continued management.  Denies fever, chills, syncope, chest pain, abdominal pain, back pain. n/v/d, dysuria (14 Jul 2024 04:13)      Endo was consulted for thyroid disorder       PAST MEDICAL & SURGICAL HISTORY:  Dilated aortic root      Marijuana use      No significant past surgical history          FAMILY HISTORY:  Family history of myocardial infarction (Mother)    Family history of aortic dissection (Uncle)        Social History:  Engaged  Employed: 2 jobs [employer]   domiciled: just purchased new home (14 Jul 2024 04:13)            HOME MEDICATIONS:  Home Medications:  Toprol-XL 25 mg oral tablet, extended release: 1 tab(s) orally once a day (14 Jul 2024 05:31)            MEDICATIONS  (STANDING):  aspirin enteric coated 81 milliGRAM(s) Oral daily  heparin  Infusion 800 Unit(s)/Hr (10 mL/Hr) IV Continuous <Continuous>  metoprolol tartrate 25 milliGRAM(s) Oral every 12 hours  pantoprazole    Tablet 40 milliGRAM(s) Oral before breakfast  sodium chloride 0.9% lock flush 3 milliLiter(s) IV Push every 8 hours    MEDICATIONS  (PRN):  diphenhydrAMINE 25 milliGRAM(s) Oral every 4 hours PRN Rash and/or Itching      Allergies    No Known Drug Allergies  shellfish (Anaphylaxis)    Intolerances        Review of Systems:  Neuro: No HA, no dizziness  Cardiovascular: No chest pain, no palpitations  Respiratory: no SOB, no cough  GI: No nausea, vomiting, abdominal pain  MSK: Denies joint/muscle pain      ALL OTHER SYSTEMS REVIEWED AND NEGATIVE        PHYSICAL EXAM:  VITALS: T(C): 36.7 (07-15-24 @ 04:52)  T(F): 98.1 (07-15-24 @ 04:52), Max: 98.2 (07-14-24 @ 21:07)  HR: 87 (07-15-24 @ 10:10) (72 - 87)  BP: 115/69 (07-15-24 @ 10:10) (100/66 - 115/69)  RR:  (18 - 18)  SpO2:  (95% - 98%)  Wt(kg): --  GENERAL: NAD, well-groomed, well-developed  NEURO:  alert and oriented  RESPIRATORY: Clear to auscultation bilaterally; No rales, rhonchi, wheezing  CARDIOVASCULAR: Si S2  GI: Soft, non distended, normal bowel sounds  MUSCULOSKELETAL: Moves all extremities equally                                 13.6   4.55  )-----------( 163      ( 15 Jul 2024 01:39 )             41.1       07-15    139  |  105  |  10  ----------------------------<  111<H>  4.0   |  26  |  0.86    eGFR: 108    Ca    9.1      07-15    TPro  6.6  /  Alb  3.5  /  TBili  0.9  /  DBili  x   /  AST  12  /  ALT  11  /  AlkPhos  87  07-15      Thyroid Function Tests:  07-14 @ 06:33 TSH <0.01 FreeT4 -- T3 -- Anti TPO -- Anti Thyroglobulin Ab -- TSI --    Diet, DASH/TLC:   Sodium & Cholesterol Restricted  No Beef  No Pork  No Shellfish     Special Instructions for Nursing:  Sodium & Cholesterol Restricted (07-14-24 @ 17:05) [Active]          A1C with Estimated Average Glucose Result: 6.4 % (07-14-24 @ 06:35)                   HPI:  48 year old male with pmh of known aortic root dilatation 4.8 with prior annual surveillance and family history of death due to aortic dissection and MI (mother age 45)  presented to outside hospital for management of headache and incidentally found to have aortic dilatation to 5.6 cm and new onset atrial fibrillation.  Transferred to CTS service at [facility] for continued management.  Denies fever, chills, syncope, chest pain, abdominal pain, back pain. n/v/d, dysuria (14 Jul 2024 04:13)      Endo was consulted for thyroid disorder       PAST MEDICAL & SURGICAL HISTORY:  Dilated aortic root      Marijuana use      No significant past surgical history          FAMILY HISTORY:  Family history of myocardial infarction (Mother)    Family history of aortic dissection (Uncle)        Social History:  Engaged  Employed: 2 jobs [employer]   domiciled: just purchased new home (14 Jul 2024 04:13)            HOME MEDICATIONS:  Home Medications:  Toprol-XL 25 mg oral tablet, extended release: 1 tab(s) orally once a day (14 Jul 2024 05:31)            MEDICATIONS  (STANDING):  aspirin enteric coated 81 milliGRAM(s) Oral daily  heparin  Infusion 800 Unit(s)/Hr (10 mL/Hr) IV Continuous <Continuous>  metoprolol tartrate 25 milliGRAM(s) Oral every 12 hours  pantoprazole    Tablet 40 milliGRAM(s) Oral before breakfast  sodium chloride 0.9% lock flush 3 milliLiter(s) IV Push every 8 hours    MEDICATIONS  (PRN):  diphenhydrAMINE 25 milliGRAM(s) Oral every 4 hours PRN Rash and/or Itching      Allergies    No Known Drug Allergies  shellfish (Anaphylaxis)    Intolerances        Review of Systems:  Neuro: No HA, no dizziness  Cardiovascular: No chest pain, no palpitations  Respiratory: no SOB, no cough  GI: No nausea, vomiting, abdominal pain  MSK: Denies joint/muscle pain      ALL OTHER SYSTEMS REVIEWED AND NEGATIVE        PHYSICAL EXAM:  VITALS: T(C): 36.7 (07-15-24 @ 04:52)  T(F): 98.1 (07-15-24 @ 04:52), Max: 98.2 (07-14-24 @ 21:07)  HR: 87 (07-15-24 @ 10:10) (72 - 87)  BP: 115/69 (07-15-24 @ 10:10) (100/66 - 115/69)  RR:  (18 - 18)  SpO2:  (95% - 98%)  Wt(kg): --  GENERAL: NAD, well-groomed, well-developed  NEURO:  alert and oriented  RESPIRATORY: Clear to auscultation bilaterally; No rales, rhonchi, wheezing  CARDIOVASCULAR: Si S2  GI: Soft, non distended, normal bowel sounds  MUSCULOSKELETAL: Moves all extremities equally                                 13.6   4.55  )-----------( 163      ( 15 Jul 2024 01:39 )             41.1       07-15    139  |  105  |  10  ----------------------------<  111<H>  4.0   |  26  |  0.86    eGFR: 108    Ca    9.1      07-15    TPro  6.6  /  Alb  3.5  /  TBili  0.9  /  DBili  x   /  AST  12  /  ALT  11  /  AlkPhos  87  07-15      Thyroid Function Tests:  07-14 @ 06:33 TSH <0.01 FreeT4 -- T3 -- Anti TPO -- Anti Thyroglobulin Ab -- TSI --    Diet, DASH/TLC:   Sodium & Cholesterol Restricted  No Beef  No Pork  No Shellfish     Special Instructions for Nursing:  Sodium & Cholesterol Restricted (07-14-24 @ 17:05) [Active]          A1C with Estimated Average Glucose Result: 6.4 % (07-14-24 @ 06:35)

## 2024-07-16 DIAGNOSIS — Z87.898 PERSONAL HISTORY OF OTHER SPECIFIED CONDITIONS: ICD-10-CM

## 2024-07-16 LAB
ALBUMIN SERPL ELPH-MCNC: 3.6 G/DL — SIGNIFICANT CHANGE UP (ref 3.3–5)
ALP SERPL-CCNC: 92 U/L — SIGNIFICANT CHANGE UP (ref 40–120)
ALT FLD-CCNC: 10 U/L — SIGNIFICANT CHANGE UP (ref 10–45)
ANION GAP SERPL CALC-SCNC: 11 MMOL/L — SIGNIFICANT CHANGE UP (ref 5–17)
APTT BLD: 32.7 SEC — SIGNIFICANT CHANGE UP (ref 24.5–35.6)
APTT BLD: 34.3 SEC — SIGNIFICANT CHANGE UP (ref 24.5–35.6)
AST SERPL-CCNC: 12 U/L — SIGNIFICANT CHANGE UP (ref 10–40)
BILIRUB SERPL-MCNC: 0.5 MG/DL — SIGNIFICANT CHANGE UP (ref 0.2–1.2)
BUN SERPL-MCNC: 11 MG/DL — SIGNIFICANT CHANGE UP (ref 7–23)
CALCIUM SERPL-MCNC: 9.1 MG/DL — SIGNIFICANT CHANGE UP (ref 8.4–10.5)
CHLORIDE SERPL-SCNC: 105 MMOL/L — SIGNIFICANT CHANGE UP (ref 96–108)
CO2 SERPL-SCNC: 23 MMOL/L — SIGNIFICANT CHANGE UP (ref 22–31)
CREAT SERPL-MCNC: 0.76 MG/DL — SIGNIFICANT CHANGE UP (ref 0.5–1.3)
EGFR: 112 ML/MIN/1.73M2 — SIGNIFICANT CHANGE UP
GLUCOSE SERPL-MCNC: 99 MG/DL — SIGNIFICANT CHANGE UP (ref 70–99)
HCT VFR BLD CALC: 44.6 % — SIGNIFICANT CHANGE UP (ref 39–50)
HGB BLD-MCNC: 14.6 G/DL — SIGNIFICANT CHANGE UP (ref 13–17)
INR BLD: 1.03 RATIO — SIGNIFICANT CHANGE UP (ref 0.85–1.18)
MCHC RBC-ENTMCNC: 25.1 PG — LOW (ref 27–34)
MCHC RBC-ENTMCNC: 32.7 GM/DL — SIGNIFICANT CHANGE UP (ref 32–36)
MCV RBC AUTO: 76.6 FL — LOW (ref 80–100)
NRBC # BLD: 0 /100 WBCS — SIGNIFICANT CHANGE UP (ref 0–0)
PLATELET # BLD AUTO: 169 K/UL — SIGNIFICANT CHANGE UP (ref 150–400)
POTASSIUM SERPL-MCNC: 4 MMOL/L — SIGNIFICANT CHANGE UP (ref 3.5–5.3)
POTASSIUM SERPL-SCNC: 4 MMOL/L — SIGNIFICANT CHANGE UP (ref 3.5–5.3)
PROT SERPL-MCNC: 7 G/DL — SIGNIFICANT CHANGE UP (ref 6–8.3)
PROTHROM AB SERPL-ACNC: 11.3 SEC — SIGNIFICANT CHANGE UP (ref 9.5–13)
RBC # BLD: 5.82 M/UL — HIGH (ref 4.2–5.8)
RBC # FLD: 14.1 % — SIGNIFICANT CHANGE UP (ref 10.3–14.5)
SODIUM SERPL-SCNC: 139 MMOL/L — SIGNIFICANT CHANGE UP (ref 135–145)
T4 FREE SERPL-MCNC: 1.5 NG/DL — SIGNIFICANT CHANGE UP (ref 0.9–1.8)
THYROPEROXIDASE AB SERPL-ACNC: <10 IU/ML — SIGNIFICANT CHANGE UP
WBC # BLD: 4.66 K/UL — SIGNIFICANT CHANGE UP (ref 3.8–10.5)
WBC # FLD AUTO: 4.66 K/UL — SIGNIFICANT CHANGE UP (ref 3.8–10.5)

## 2024-07-16 PROCEDURE — 99232 SBSQ HOSP IP/OBS MODERATE 35: CPT

## 2024-07-16 PROCEDURE — 75574 CT ANGIO HRT W/3D IMAGE: CPT | Mod: 26

## 2024-07-16 PROCEDURE — 70450 CT HEAD/BRAIN W/O DYE: CPT | Mod: 26

## 2024-07-16 PROCEDURE — 76536 US EXAM OF HEAD AND NECK: CPT | Mod: 26

## 2024-07-16 RX ORDER — METOPROLOL TARTRATE 50 MG
25 TABLET ORAL ONCE
Refills: 0 | Status: COMPLETED | OUTPATIENT
Start: 2024-07-16 | End: 2024-07-16

## 2024-07-16 RX ORDER — METOPROLOL TARTRATE 50 MG
5 TABLET ORAL ONCE
Refills: 0 | Status: COMPLETED | OUTPATIENT
Start: 2024-07-16 | End: 2024-07-16

## 2024-07-16 RX ORDER — AMIODARONE HYDROCHLORIDE 50 MG/ML
INJECTION, SOLUTION INTRAVENOUS
Refills: 0 | Status: DISCONTINUED | OUTPATIENT
Start: 2024-07-16 | End: 2024-07-17

## 2024-07-16 RX ORDER — AMIODARONE HYDROCHLORIDE 50 MG/ML
400 INJECTION, SOLUTION INTRAVENOUS EVERY 8 HOURS
Refills: 0 | Status: DISCONTINUED | OUTPATIENT
Start: 2024-07-16 | End: 2024-07-17

## 2024-07-16 RX ORDER — ACETAMINOPHEN 325 MG
650 TABLET ORAL EVERY 6 HOURS
Refills: 0 | Status: DISCONTINUED | OUTPATIENT
Start: 2024-07-16 | End: 2024-07-17

## 2024-07-16 RX ADMIN — AMIODARONE HYDROCHLORIDE 400 MILLIGRAM(S): 50 INJECTION, SOLUTION INTRAVENOUS at 21:02

## 2024-07-16 RX ADMIN — PANTOPRAZOLE SODIUM 40 MILLIGRAM(S): 40 INJECTION, POWDER, FOR SOLUTION INTRAVENOUS at 05:20

## 2024-07-16 RX ADMIN — Medication 25 MILLIGRAM(S): at 13:08

## 2024-07-16 RX ADMIN — Medication 25 MILLIGRAM(S): at 09:47

## 2024-07-16 RX ADMIN — AMIODARONE HYDROCHLORIDE 400 MILLIGRAM(S): 50 INJECTION, SOLUTION INTRAVENOUS at 09:47

## 2024-07-16 RX ADMIN — Medication 5 MILLIGRAM(S): at 15:05

## 2024-07-16 RX ADMIN — Medication 650 MILLIGRAM(S): at 16:54

## 2024-07-16 RX ADMIN — Medication 3 MILLILITER(S): at 05:05

## 2024-07-16 RX ADMIN — Medication 650 MILLIGRAM(S): at 17:24

## 2024-07-16 RX ADMIN — ASPIRIN 81 MILLIGRAM(S): 325 TABLET, FILM COATED ORAL at 09:48

## 2024-07-16 RX ADMIN — Medication 3 MILLILITER(S): at 19:54

## 2024-07-16 RX ADMIN — Medication 25 MILLIGRAM(S): at 21:03

## 2024-07-16 RX ADMIN — Medication 3 MILLILITER(S): at 10:39

## 2024-07-16 NOTE — PROGRESS NOTE ADULT - SUBJECTIVE AND OBJECTIVE BOX
Chief complaint  Patient is a 48y old  Male who presents with a chief complaint of cardiac surgery evaluation (16 Jul 2024 09:47)         Labs and Fingersticks  CAPILLARY BLOOD GLUCOSE          Anion Gap: 11 (07-16 @ 05:51)  Anion Gap: 8 (07-15 @ 01:39)      Calcium: 9.1 (07-16 @ 05:51)  Calcium: 9.1 (07-15 @ 01:39)  Albumin: 3.6 (07-16 @ 05:51)  Albumin: 3.5 (07-15 @ 01:39)    Alanine Aminotransferase (ALT/SGPT): 10 (07-16 @ 05:51)  Alanine Aminotransferase (ALT/SGPT): 11 (07-15 @ 01:39)  Alkaline Phosphatase: 92 (07-16 @ 05:51)  Alkaline Phosphatase: 87 (07-15 @ 01:39)  Aspartate Aminotransferase (AST/SGOT): 12 (07-16 @ 05:51)  Aspartate Aminotransferase (AST/SGOT): 12 (07-15 @ 01:39)        07-16    139  |  105  |  11  ----------------------------<  99  4.0   |  23  |  0.76    Ca    9.1      16 Jul 2024 05:51    TPro  7.0  /  Alb  3.6  /  TBili  0.5  /  DBili  x   /  AST  12  /  ALT  10  /  AlkPhos  92  07-16                        14.6   4.66  )-----------( 169      ( 16 Jul 2024 05:51 )             44.6     Medications  MEDICATIONS  (STANDING):  aMIOdarone    Tablet   Oral   aMIOdarone    Tablet 400 milliGRAM(s) Oral every 8 hours  aspirin enteric coated 81 milliGRAM(s) Oral daily  heparin  Infusion 800 Unit(s)/Hr (10 mL/Hr) IV Continuous <Continuous>  metoprolol tartrate 25 milliGRAM(s) Oral every 12 hours  pantoprazole    Tablet 40 milliGRAM(s) Oral before breakfast  sodium chloride 0.9% lock flush 3 milliLiter(s) IV Push every 8 hours      Physical Exam  General: Patient comfortable in bed   Vital Signs Last 12 Hrs  T(F): 98.1 (07-16-24 @ 04:43), Max: 98.1 (07-16-24 @ 04:43)  HR: 86 (07-16-24 @ 12:55) (76 - 90)  BP: 123/73 (07-16-24 @ 12:55) (123/73 - 137/86)  BP(mean): 90 (07-16-24 @ 12:55) (90 - 95)  RR: 18 (07-16-24 @ 12:55) (18 - 18)  SpO2: 96% (07-16-24 @ 12:55) (96% - 96%)    CVS: S1S2   Respiratory: No wheezing, no crepitations  GI: Abdomen soft, bowel sounds positive  Musculoskeletal:  moves all extremities  : Voiding        Chief complaint  Patient is a 48y old  Male who presents with a chief complaint of cardiac surgery evaluation (16 Jul 2024 09:47)         Labs and Fingersticks  CAPILLARY BLOOD GLUCOSE          Anion Gap: 11 (07-16 @ 05:51)  Anion Gap: 8 (07-15 @ 01:39)      Calcium: 9.1 (07-16 @ 05:51)  Calcium: 9.1 (07-15 @ 01:39)  Albumin: 3.6 (07-16 @ 05:51)  Albumin: 3.5 (07-15 @ 01:39)    Alanine Aminotransferase (ALT/SGPT): 10 (07-16 @ 05:51)  Alanine Aminotransferase (ALT/SGPT): 11 (07-15 @ 01:39)  Alkaline Phosphatase: 92 (07-16 @ 05:51)  Alkaline Phosphatase: 87 (07-15 @ 01:39)  Aspartate Aminotransferase (AST/SGOT): 12 (07-16 @ 05:51)  Aspartate Aminotransferase (AST/SGOT): 12 (07-15 @ 01:39)        07-16    139  |  105  |  11  ----------------------------<  99  4.0   |  23  |  0.76    Ca    9.1      16 Jul 2024 05:51    TPro  7.0  /  Alb  3.6  /  TBili  0.5  /  DBili  x   /  AST  12  /  ALT  10  /  AlkPhos  92  07-16                        14.6   4.66  )-----------( 169      ( 16 Jul 2024 05:51 )             44.6     Medications  MEDICATIONS  (STANDING):  aMIOdarone    Tablet   Oral   aMIOdarone    Tablet 400 milliGRAM(s) Oral every 8 hours  aspirin enteric coated 81 milliGRAM(s) Oral daily  heparin  Infusion 800 Unit(s)/Hr (10 mL/Hr) IV Continuous <Continuous>  metoprolol tartrate 25 milliGRAM(s) Oral every 12 hours  pantoprazole    Tablet 40 milliGRAM(s) Oral before breakfast  sodium chloride 0.9% lock flush 3 milliLiter(s) IV Push every 8 hours      Physical Exam  General: Patient comfortable in bed   Vital Signs Last 12 Hrs  T(F): 98.1 (07-16-24 @ 04:43), Max: 98.1 (07-16-24 @ 04:43)  HR: 86 (07-16-24 @ 12:55) (76 - 90)  BP: 123/73 (07-16-24 @ 12:55) (123/73 - 137/86)  BP(mean): 90 (07-16-24 @ 12:55) (90 - 95)  RR: 18 (07-16-24 @ 12:55) (18 - 18)  SpO2: 96% (07-16-24 @ 12:55) (96% - 96%)    CVS: S1S2   Respiratory: No wheezing, no crepitations  GI: Abdomen soft, bowel sounds positive  Musculoskeletal:  moves all extremities  : Voiding

## 2024-07-16 NOTE — PROGRESS NOTE ADULT - ASSESSMENT
This is a 48 year old male with pmh of known aortic root dilatation who  presented to outside hospital for management of headache and  found to have aortic dilatation increased to 5.6 cm from 4.8 in 2019 and new onset atrial fibrillation.  Transferred for cardiac surgery evaluation.     7/14 VSS CP free in afib heparin gtt for AC echo/  CTA completed today  will need cardiac cath OR date TBD  7/15 CT CORS Ao root gated coronal and sagitals to be reconstructed off of gated CT pending. Endocrinology consulted for Graves Disease work up. Ultrasound thyroid. MRA head to evaluate patient complaint of HA and vision changes. Heparin ggt for afib  Coronary cath deferred for now. BP controlled  7/16 VSS US today Neurology consulted needs MRI Braina ndorbits   afib up to 120 amiodarone load initiated .      This is a 48 year old male with pmh of known aortic root dilatation who  presented to outside hospital for management of headache and  found to have aortic dilatation increased to 5.6 cm from 4.8 in 2019 and new onset atrial fibrillation.  Transferred for cardiac surgery evaluation.     7/14 VSS CP free in afib heparin gtt for AC echo/  CTA completed today  will need cardiac cath OR date TBD  7/15 CT CORS Ao root gated coronal and sagitals to be reconstructed off of gated CT pending. Endocrinology consulted for Graves Disease work up. Ultrasound thyroid. MRA head to evaluate patient complaint of HA and vision changes. Heparin ggt for afib  Coronary cath deferred for now. BP controlled  7/16 VSS afib up to 120 amiodarone load initiated Thyroid US today- Neurology consulted patient unable to tolerated MRI head CT non con CT cors today -   afib up to 120 amiodarone load initiated .

## 2024-07-16 NOTE — CONSULT NOTE ADULT - SUBJECTIVE AND OBJECTIVE BOX
Neurology Consult    Reason for Consult: Patient is a 48y old  Male who presents with a chief complaint of cardiac surgery evaluation (16 Jul 2024 09:32)      HPI:  48 year old male with pmh of known aortic root dilatation 4.8 with prior annual surveillance and family history of death due to aortic dissection and MI (mother age 45)  presented to outside hospital for management of headache and incidentally found to have aortic dilatation to 5.6 cm and new onset atrial fibrillation.  Transferred to CTS service at [facility] for continued management.  Denies fever, chills, syncope, chest pain, abdominal pain, back pain. n/v/d, dysuria (14 Jul 2024 04:13)       PAST MEDICAL & SURGICAL HISTORY:  Dilated aortic root      Marijuana use      No significant past surgical history          Allergies: Allergies    No Known Drug Allergies  shellfish (Anaphylaxis)    Intolerances        Social History: Denies toxic habits including tobacco, ETOH or illicit drugs.    Family History: FAMILY HISTORY:  Family history of myocardial infarction (Mother)    Family history of aortic dissection (Uncle)    . No family history of strokes    Medications: MEDICATIONS  (STANDING):  aMIOdarone    Tablet   Oral   aMIOdarone    Tablet 400 milliGRAM(s) Oral every 8 hours  aspirin enteric coated 81 milliGRAM(s) Oral daily  heparin  Infusion 800 Unit(s)/Hr (10 mL/Hr) IV Continuous <Continuous>  metoprolol tartrate 25 milliGRAM(s) Oral every 12 hours  pantoprazole    Tablet 40 milliGRAM(s) Oral before breakfast  sodium chloride 0.9% lock flush 3 milliLiter(s) IV Push every 8 hours    MEDICATIONS  (PRN):  diphenhydrAMINE 25 milliGRAM(s) Oral every 4 hours PRN Rash and/or Itching      Review of Systems:  CONSTITUTIONAL:  No weight loss, fever, chills, weakness or fatigue.  HEENT:  Eyes:  No visual loss, blurred vision, double vision or yellow sclera. Ears, Nose, Throat:  No hearing loss, sneezing, congestion, runny nose or sore throat.  SKIN:  No rash or itching.  CARDIOVASCULAR:  No chest pain, chest pressure or chest discomfort. No palpitations or edema.  RESPIRATORY:  No shortness of breath, cough or sputum.  GASTROINTESTINAL:  No anorexia, nausea, vomiting or diarrhea. No abdominal pain or blood.  GENITOURINARY:  No burning on urination or incontinence   NEUROLOGICAL:  No headache, dizziness, syncope, paralysis, ataxia, numbness or tingling in the extremities. No change in bowel or bladder control. no limb weakness. no vision changes.   MUSCULOSKELETAL:  No muscle, back pain, joint pain or stiffness.  HEMATOLOGIC:  No anemia, bleeding or bruising.  LYMPHATICS:  No enlarged nodes. No history of splenectomy.  PSYCHIATRIC:  No history of depression or anxiety.  ENDOCRINOLOGIC:  No reports of sweating, cold or heat intolerance. No polyuria or polydipsia.      Vitals:  Vital Signs Last 24 Hrs  T(C): 36.7 (16 Jul 2024 04:43), Max: 36.7 (16 Jul 2024 04:43)  T(F): 98.1 (16 Jul 2024 04:43), Max: 98.1 (16 Jul 2024 04:43)  HR: 90 (16 Jul 2024 09:00) (76 - 90)  BP: 136/75 (16 Jul 2024 09:00) (115/69 - 137/86)  BP(mean): 95 (16 Jul 2024 09:00) (95 - 95)  RR: 18 (16 Jul 2024 04:43) (18 - 18)  SpO2: 96% (16 Jul 2024 04:43) (96% - 96%)    Parameters below as of 16 Jul 2024 04:43  Patient On (Oxygen Delivery Method): room air        General Exam:   General Appearance: Appropriately dressed and in no acute distress       Head: Normocephalic, atraumatic and no dysmorphic features  Ear, Nose, and Throat: Moist mucous membranes  CVS: S1S2+  Resp: No SOB, no wheeze or rhonchi  GI: soft NT/ND  Extremities: No edema or cyanosis  Skin: No bruises or rashes     Neurological Exam:  Mental Status: Awake, alert and oriented x 3.  Able to follow simple and complex verbal commands. Able to name and repeat. fluent speech. No obvious aphasia or dysarthria noted.   Cranial Nerves: PERRL, EOMI, VFFC, sensation V1-V3 intact,  no obvious facial asymmetry, equal elevation of palate, scm/trap 5/5, tongue is midline on protrusion. no obvious papilledema on fundoscopic exam. hearing is grossly intact.   Motor: Normal bulk, tone and strength throughout. Fine finger movements were intact and symmetric. no tremors or drift noted.    Sensation: Intact to light touch and pinprick throughout. no right/left confusion. no extinction to tactile on DSS. Romberg was negative.   Reflexes: 1+ throughout at biceps, brachioradialis, triceps, patellars and ankles bilaterally and equal. No clonus. R toe and L toe were both downgoing.  Coordination: No dysmetria on FNF or HKS  Gait: Narrow based and steady. Able to tandem. no limitations in gait.     Data/Labs/Imaging which I personally reviewed.     Labs:     CBC Full  -  ( 16 Jul 2024 05:51 )  WBC Count : 4.66 K/uL  RBC Count : 5.82 M/uL  Hemoglobin : 14.6 g/dL  Hematocrit : 44.6 %  Platelet Count - Automated : 169 K/uL  Mean Cell Volume : 76.6 fl  Mean Cell Hemoglobin : 25.1 pg  Mean Cell Hemoglobin Concentration : 32.7 gm/dL  Auto Neutrophil # : x  Auto Lymphocyte # : x  Auto Monocyte # : x  Auto Eosinophil # : x  Auto Basophil # : x  Auto Neutrophil % : x  Auto Lymphocyte % : x  Auto Monocyte % : x  Auto Eosinophil % : x  Auto Basophil % : x    07-16    139  |  105  |  11  ----------------------------<  99  4.0   |  23  |  0.76    Ca    9.1      16 Jul 2024 05:51    TPro  7.0  /  Alb  3.6  /  TBili  0.5  /  DBili  x   /  AST  12  /  ALT  10  /  AlkPhos  92  07-16    LIVER FUNCTIONS - ( 16 Jul 2024 05:51 )  Alb: 3.6 g/dL / Pro: 7.0 g/dL / ALK PHOS: 92 U/L / ALT: 10 U/L / AST: 12 U/L / GGT: x           PT/INR - ( 16 Jul 2024 05:51 )   PT: 11.3 sec;   INR: 1.03 ratio         PTT - ( 16 Jul 2024 05:51 )  PTT:34.3 sec  Urinalysis Basic - ( 16 Jul 2024 05:51 )    Color: x / Appearance: x / SG: x / pH: x  Gluc: 99 mg/dL / Ketone: x  / Bili: x / Urobili: x   Blood: x / Protein: x / Nitrite: x   Leuk Esterase: x / RBC: x / WBC x   Sq Epi: x / Non Sq Epi: x / Bacteria: x

## 2024-07-16 NOTE — CONSULT NOTE ADULT - ASSESSMENT
48 year old male with known aortic root dilatation 4.8 with prior annual surveillance and family history of death due to aortic dissection and MI (mother age 45)  presented to outside hospital for management of headache and incidentally found to have aortic dilatation to 5.6 cm and new onset atrial fibrillation.  Transferred to CTS service at [facility] HA and eye pain imporved. neurology called for HA   7/14 ECHO- 2. Aortic root at the sinuses of Valsalva is aneurysmal, measuring 5.10 cm (indexed 2.05 cm/m²). Ascending aorta diameter is aneurysmal, measuring 4.80 cm   A1c 6.4  TSH low   Impression:   HA/eye pain, now improved     - patient severely claustrophobic was locked in a trunk as a child. can pursue CTH as alternative   - heparin drip for AF, also on ASA  - amio for rate control   - TFTs, f/u endo   - telemetry  - PT/OT   - check FS, glucose control <180  - GI/DVT ppx  - Counseling on diet, exercise, and medication adherence was done  - Counseling on smoking cessation and alcohol consumption offered when appropriate.  - Pain assessed and judicious use of narcotics when appropriate was discussed.    - Stroke education given when appropriate.  - Importance of fall prevention discussed.   - Differential diagnosis and plan of care discussed with patient and/or family and primary team  - Thank you for allowing me to participate in the care of this patient. Call with questions.   spoke with patient and primary team   Patricio Mina MD  Vascular Neurology  Office: [phone]

## 2024-07-16 NOTE — PROGRESS NOTE ADULT - PROBLEM SELECTOR PLAN 3
Dr Munoz following    Dr Mina follwing    MRI  brain  and orbits    Thyroid US Dr Munoz following    Dr Keeley ruizwing   CT Head noncontrast pending  Thyroid US

## 2024-07-16 NOTE — PROGRESS NOTE ADULT - PROBLEM SELECTOR PLAN 1
Thyroid US results pending   Thyroid AB pending   Subclinical now, Suggest to repeat thyroid function test in 4-6 weeks. Outpatient follow up. Thyroid US results pending   Thyroid AB pending   Subclinical now, Suggest to repeat thyroid function test in 4-6 weeks. Outpatient follow up.  Headaches being worked up by neurology team, CT head pending.

## 2024-07-16 NOTE — PROGRESS NOTE ADULT - ASSESSMENT
48 year old male with pmh of known aortic root dilatation who  presented to outside hospital for management of headache and  found to have aortic dilatation increased to 5.6 cm from 4.8 in 2019 and new onset atrial fibrillation.  Found to have suppressed TSH levels     Assessment  Hyperthyroidism: TSH suppressed, having tachycardia, no thyroid dz history, Full TFT panel reviewed, Thyroid AB pending.    Free thyroxine came back normal 1.5 , subclinical , unlikely contributing to tachycardia/palpitations   Thyroid US results pending   Aortic root dilation: on medications, CT surgery eval, monitored.  HTN: on antihypertensive medications, monitored, asymptomatic.      Discussed plan and management wit Dr Tammy Munoz MD  Cell: [phone]  Office: [phone]                48 year old male with pmh of known aortic root dilatation who  presented to outside hospital for management of headache and  found to have aortic dilatation increased to 5.6 cm from 4.8 in 2019 and new onset atrial fibrillation.  Found to have suppressed TSH levels     A  ssessment  Hyperthyroidism: TSH suppressed, having tachycardia, no thyroid dz history, Full TFT panel reviewed, Thyroid AB pending.    Free thyroxine came back normal 1.5 , subclinical.  Thyroid US results pending   Aortic root dilation: on medications, CT surgery eval, monitored.  HTN: on antihypertensive medications, monitored, asymptomatic.      Discussed plan and management wit Dr Tammy Munoz MD  Cell: [phone]  Office: [phone]

## 2024-07-16 NOTE — PROGRESS NOTE ADULT - PROBLEM SELECTOR PLAN 1
admit to Dr Serrano   telemetry monitor  metorpolol 25 bid  cardiac surgery labs  7/14 ECHO- 2. Aortic root at the sinuses of Valsalva is aneurysmal, measuring 5.10 cm (indexed 2.05 cm/m²). Ascending aorta diameter is aneurysmal, measuring 4.80 cm   7/14 CTA chest  completed  7/16 Thyroid US   blood pressure management  OR date TBD admit to Dr Serrano   telemetry monitor  metorpolol 25 bid  cardiac surgery labs  7/14 ECHO- 2. Aortic root at the sinuses of Valsalva is aneurysmal, measuring 5.10 cm (indexed 2.05 cm/m²). Ascending aorta diameter is aneurysmal, measuring 4.80 cm   7/14 CTA chest  completed  7/16 Thyroid US   7/16 CT Cors pending  blood pressure management  OR date TBD

## 2024-07-16 NOTE — PROGRESS NOTE ADULT - SUBJECTIVE AND OBJECTIVE BOX
Subjective " hello I am ok"    VITAL SIGNS    Telemetry:  Afib      Vital Signs Last 24 Hrs  T(C): 36.7 (24 @ 04:43), Max: 36.7 (24 @ 04:43)  T(F): 98.1 (24 @ 04:43), Max: 98.1 (24 @ 04:43)  HR: 90 (24 @ 09:00) (76 - 90)  BP: 136/75 (24 @ 09:00) (115/69 - 137/86)  RR: 18 (24 @ 04:43) (18 - 18)  SpO2: 96% (24 @ 04:43) (96% - 96%)             07-15 @ 07:01  -   @ 07:00  --------------------------------------------------------  IN: 710 mL / OUT: 2800 mL / NET: -2090 mL     @ 07:01  -   @ 09:33  --------------------------------------------------------  IN: 0 mL / OUT: 175 mL / NET: -175 mL    Daily Weight in k.9 (2024 08:43)                         14.6   4.66  )-----------( 169      ( 2024 05:51 )             44.6     07-    139  |  105  |  11  ----------------------------<  99  4.0   |  23  |  0.76    Ca    9.1      2024 05:51    TPro  7.0  /  Alb  3.6  /  TBili  0.5  /  DBili  x   /  AST  12  /  ALT  10  /  AlkPhos  92  07-16    PT/INR - ( 2024 05:51 )   PT: 11.3 sec;   INR: 1.03 ratio         PTT - ( 2024 05:51 )  PTT:34.3 sec    MEDICATIONS  (STANDING):  aMIOdarone    Tablet   Oral   aMIOdarone    Tablet 400 milliGRAM(s) Oral every 8 hours  aspirin enteric coated 81 milliGRAM(s) Oral daily  heparin  Infusion 800 Unit(s)/Hr (10 mL/Hr) IV Continuous <Continuous>  metoprolol tartrate 25 milliGRAM(s) Oral every 12 hours  pantoprazole    Tablet 40 milliGRAM(s) Oral before breakfast  sodium chloride 0.9% lock flush 3 milliLiter(s) IV Push every 8 hours    MEDICATIONS  (PRN):  diphenhydrAMINE 25 milliGRAM(s) Oral every 4 hours PRN Rash and/or Itching                            PHYSICAL EXAM        Neurology: alert and oriented x 3, nonfocal, no gross deficits    CV :S1IRR      Lungs: B/l CT A on room air     Abdomen: soft, nontender, nondistended, positive bowel sounds, + Bm    :   voids            Extremities: warm well perfused equal strength throughout     B/lle + DP trace edema no calf tenderness                                        Physical Therapy Rec:   Home  [  ]   Home w/ PT  [  ]  Rehab  [  ]    Discussed with Cardiothoracic Team at AM rounds. Subjective " hello I am ok"    VITAL SIGNS    Telemetry:  Afib      Vital Signs Last 24 Hrs  T(C): 36.7 (24 @ 04:43), Max: 36.7 (24 @ 04:43)  T(F): 98.1 (24 @ 04:43), Max: 98.1 (24 @ 04:43)  HR: 90 (24 @ 09:00) (76 - 90)  BP: 136/75 (24 @ 09:00) (115/69 - 137/86)  RR: 18 (24 @ 04:43) (18 - 18)  SpO2: 96% (24 @ 04:43) (96% - 96%)             07-15 @ 07:01  -   @ 07:00  --------------------------------------------------------  IN: 710 mL / OUT: 2800 mL / NET: -2090 mL     @ 07:01  -   @ 09:33  --------------------------------------------------------  IN: 0 mL / OUT: 175 mL / NET: -175 mL    Daily Weight in k.9 (2024 08:43)                         14.6   4.66  )-----------( 169      ( 2024 05:51 )             44.6     07-    139  |  105  |  11  ----------------------------<  99  4.0   |  23  |  0.76    Ca    9.1      2024 05:51    TPro  7.0  /  Alb  3.6  /  TBili  0.5  /  DBili  x   /  AST  12  /  ALT  10  /  AlkPhos  92  07-16    PT/INR - ( 2024 05:51 )   PT: 11.3 sec;   INR: 1.03 ratio         PTT - ( 2024 05:51 )  PTT:34.3 sec    MEDICATIONS  (STANDING):  aMIOdarone    Tablet   Oral   aMIOdarone    Tablet 400 milliGRAM(s) Oral every 8 hours  aspirin enteric coated 81 milliGRAM(s) Oral daily  heparin  Infusion 800 Unit(s)/Hr (10 mL/Hr) IV Continuous <Continuous>  metoprolol tartrate 25 milliGRAM(s) Oral every 12 hours  pantoprazole    Tablet 40 milliGRAM(s) Oral before breakfast  sodium chloride 0.9% lock flush 3 milliLiter(s) IV Push every 8 hours    MEDICATIONS  (PRN):  diphenhydrAMINE 25 milliGRAM(s) Oral every 4 hours PRN Rash and/or Itching                            PHYSICAL EXAM        Neurology: alert and oriented x 3, nonfocal, no gross deficits    CV :S1IRR      Lungs: B/l CT A on room air     Abdomen: soft, nontender, nondistended, positive bowel sounds, + Bm    :   voids            Extremities: warm well perfused equal strength throughout     B/lle + DP trace edema no calf tenderness                                            Discussed with Cardiothoracic Team at AM rounds.

## 2024-07-17 ENCOUNTER — TRANSCRIPTION ENCOUNTER (OUTPATIENT)
Age: 49
End: 2024-07-17

## 2024-07-17 VITALS — HEART RATE: 84 BPM | DIASTOLIC BLOOD PRESSURE: 83 MMHG | SYSTOLIC BLOOD PRESSURE: 119 MMHG

## 2024-07-17 DIAGNOSIS — C75.1: ICD-10-CM

## 2024-07-17 PROBLEM — Z00.00 ENCOUNTER FOR PREVENTIVE HEALTH EXAMINATION: Status: ACTIVE | Noted: 2024-07-17

## 2024-07-17 LAB
ALBUMIN SERPL ELPH-MCNC: 3.6 G/DL — SIGNIFICANT CHANGE UP (ref 3.3–5)
ALP SERPL-CCNC: 86 U/L — SIGNIFICANT CHANGE UP (ref 40–120)
ALT FLD-CCNC: 14 U/L — SIGNIFICANT CHANGE UP (ref 10–45)
ANION GAP SERPL CALC-SCNC: 11 MMOL/L — SIGNIFICANT CHANGE UP (ref 5–17)
APTT BLD: 34 SEC — SIGNIFICANT CHANGE UP (ref 24.5–35.6)
APTT BLD: 35 SEC — SIGNIFICANT CHANGE UP (ref 24.5–35.6)
APTT BLD: 36.8 SEC — HIGH (ref 24.5–35.6)
AST SERPL-CCNC: 17 U/L — SIGNIFICANT CHANGE UP (ref 10–40)
BILIRUB SERPL-MCNC: 0.7 MG/DL — SIGNIFICANT CHANGE UP (ref 0.2–1.2)
BUN SERPL-MCNC: 12 MG/DL — SIGNIFICANT CHANGE UP (ref 7–23)
CALCIUM SERPL-MCNC: 9.2 MG/DL — SIGNIFICANT CHANGE UP (ref 8.4–10.5)
CHLORIDE SERPL-SCNC: 105 MMOL/L — SIGNIFICANT CHANGE UP (ref 96–108)
CO2 SERPL-SCNC: 21 MMOL/L — LOW (ref 22–31)
CREAT SERPL-MCNC: 0.76 MG/DL — SIGNIFICANT CHANGE UP (ref 0.5–1.3)
EGFR: 112 ML/MIN/1.73M2 — SIGNIFICANT CHANGE UP
GLUCOSE SERPL-MCNC: 92 MG/DL — SIGNIFICANT CHANGE UP (ref 70–99)
HCT VFR BLD CALC: 42.5 % — SIGNIFICANT CHANGE UP (ref 39–50)
HGB BLD-MCNC: 14.2 G/DL — SIGNIFICANT CHANGE UP (ref 13–17)
INR BLD: 1.11 RATIO — SIGNIFICANT CHANGE UP (ref 0.85–1.18)
MCHC RBC-ENTMCNC: 25.3 PG — LOW (ref 27–34)
MCHC RBC-ENTMCNC: 33.4 GM/DL — SIGNIFICANT CHANGE UP (ref 32–36)
MCV RBC AUTO: 75.8 FL — LOW (ref 80–100)
NRBC # BLD: 0 /100 WBCS — SIGNIFICANT CHANGE UP (ref 0–0)
PLATELET # BLD AUTO: 171 K/UL — SIGNIFICANT CHANGE UP (ref 150–400)
POTASSIUM SERPL-MCNC: 4.1 MMOL/L — SIGNIFICANT CHANGE UP (ref 3.5–5.3)
POTASSIUM SERPL-SCNC: 4.1 MMOL/L — SIGNIFICANT CHANGE UP (ref 3.5–5.3)
PROLACTIN SERPL-MCNC: 19.3 NG/ML — HIGH (ref 4.1–18.4)
PROT SERPL-MCNC: 6.8 G/DL — SIGNIFICANT CHANGE UP (ref 6–8.3)
PROTHROM AB SERPL-ACNC: 11.6 SEC — SIGNIFICANT CHANGE UP (ref 9.5–13)
RBC # BLD: 5.61 M/UL — SIGNIFICANT CHANGE UP (ref 4.2–5.8)
RBC # FLD: 14 % — SIGNIFICANT CHANGE UP (ref 10.3–14.5)
SODIUM SERPL-SCNC: 137 MMOL/L — SIGNIFICANT CHANGE UP (ref 135–145)
WBC # BLD: 4.31 K/UL — SIGNIFICANT CHANGE UP (ref 3.8–10.5)
WBC # FLD AUTO: 4.31 K/UL — SIGNIFICANT CHANGE UP (ref 3.8–10.5)

## 2024-07-17 PROCEDURE — 84445 ASSAY OF TSI GLOBULIN: CPT

## 2024-07-17 PROCEDURE — 93010 ELECTROCARDIOGRAM REPORT: CPT

## 2024-07-17 PROCEDURE — 80053 COMPREHEN METABOLIC PANEL: CPT

## 2024-07-17 PROCEDURE — 99232 SBSQ HOSP IP/OBS MODERATE 35: CPT

## 2024-07-17 PROCEDURE — 81001 URINALYSIS AUTO W/SCOPE: CPT

## 2024-07-17 PROCEDURE — 84439 ASSAY OF FREE THYROXINE: CPT

## 2024-07-17 PROCEDURE — 85730 THROMBOPLASTIN TIME PARTIAL: CPT

## 2024-07-17 PROCEDURE — 87641 MR-STAPH DNA AMP PROBE: CPT

## 2024-07-17 PROCEDURE — 36415 COLL VENOUS BLD VENIPUNCTURE: CPT

## 2024-07-17 PROCEDURE — 86850 RBC ANTIBODY SCREEN: CPT

## 2024-07-17 PROCEDURE — 86900 BLOOD TYPING SEROLOGIC ABO: CPT

## 2024-07-17 PROCEDURE — 75574 CT ANGIO HRT W/3D IMAGE: CPT | Mod: MC

## 2024-07-17 PROCEDURE — 85027 COMPLETE CBC AUTOMATED: CPT

## 2024-07-17 PROCEDURE — 74174 CTA ABD&PLVS W/CONTRAST: CPT | Mod: MC

## 2024-07-17 PROCEDURE — 85025 COMPLETE CBC W/AUTO DIFF WBC: CPT

## 2024-07-17 PROCEDURE — 71275 CT ANGIOGRAPHY CHEST: CPT | Mod: MC

## 2024-07-17 PROCEDURE — 85610 PROTHROMBIN TIME: CPT

## 2024-07-17 PROCEDURE — 71046 X-RAY EXAM CHEST 2 VIEWS: CPT

## 2024-07-17 PROCEDURE — 84443 ASSAY THYROID STIM HORMONE: CPT

## 2024-07-17 PROCEDURE — 84146 ASSAY OF PROLACTIN: CPT

## 2024-07-17 PROCEDURE — 86901 BLOOD TYPING SEROLOGIC RH(D): CPT

## 2024-07-17 PROCEDURE — 93306 TTE W/DOPPLER COMPLETE: CPT

## 2024-07-17 PROCEDURE — 86376 MICROSOMAL ANTIBODY EACH: CPT

## 2024-07-17 PROCEDURE — 83036 HEMOGLOBIN GLYCOSYLATED A1C: CPT

## 2024-07-17 PROCEDURE — 76536 US EXAM OF HEAD AND NECK: CPT

## 2024-07-17 PROCEDURE — 93005 ELECTROCARDIOGRAM TRACING: CPT

## 2024-07-17 PROCEDURE — 87640 STAPH A DNA AMP PROBE: CPT

## 2024-07-17 PROCEDURE — 70450 CT HEAD/BRAIN W/O DYE: CPT | Mod: MC

## 2024-07-17 PROCEDURE — 83880 ASSAY OF NATRIURETIC PEPTIDE: CPT

## 2024-07-17 RX ORDER — PANTOPRAZOLE SODIUM 40 MG/10ML
1 INJECTION, POWDER, FOR SOLUTION INTRAVENOUS
Qty: 30 | Refills: 0
Start: 2024-07-17 | End: 2024-08-15

## 2024-07-17 RX ORDER — AMIODARONE HYDROCHLORIDE 50 MG/ML
200 INJECTION, SOLUTION INTRAVENOUS DAILY
Refills: 0 | Status: DISCONTINUED | OUTPATIENT
Start: 2024-07-17 | End: 2024-07-17

## 2024-07-17 RX ORDER — METOPROLOL TARTRATE 50 MG
1 TABLET ORAL
Qty: 30 | Refills: 0
Start: 2024-07-17 | End: 2024-08-15

## 2024-07-17 RX ORDER — METOPROLOL TARTRATE 50 MG
50 TABLET ORAL DAILY
Refills: 0 | Status: DISCONTINUED | OUTPATIENT
Start: 2024-07-17 | End: 2024-07-17

## 2024-07-17 RX ORDER — HEPARIN SODIUM 50 [USP'U]/ML
1350 INJECTION, SOLUTION INTRAVENOUS
Qty: 25000 | Refills: 0 | Status: DISCONTINUED | OUTPATIENT
Start: 2024-07-17 | End: 2024-07-17

## 2024-07-17 RX ORDER — ACETAMINOPHEN 325 MG
2 TABLET ORAL
Qty: 180 | Refills: 0
Start: 2024-07-17 | End: 2024-08-15

## 2024-07-17 RX ORDER — POLYETHYLENE GLYCOL 3350 1 G/G
17 POWDER ORAL
Qty: 1 | Refills: 0
Start: 2024-07-17 | End: 2024-08-15

## 2024-07-17 RX ORDER — METOPROLOL TARTRATE 100 MG/1
1 TABLET ORAL
Qty: 30 | Refills: 0 | DISCHARGE
Start: 2024-07-17 | End: 2024-08-15

## 2024-07-17 RX ORDER — AMIODARONE HYDROCHLORIDE 50 MG/ML
1 INJECTION, SOLUTION INTRAVENOUS
Qty: 30 | Refills: 0
Start: 2024-07-17 | End: 2024-08-15

## 2024-07-17 RX ORDER — APIXABAN 5 MG/1
1 TABLET, FILM COATED ORAL
Qty: 60 | Refills: 0
Start: 2024-07-17 | End: 2024-08-15

## 2024-07-17 RX ORDER — METOPROLOL TARTRATE 50 MG
1 TABLET ORAL
Refills: 0 | DISCHARGE

## 2024-07-17 RX ADMIN — Medication 3 MILLILITER(S): at 05:50

## 2024-07-17 RX ADMIN — PANTOPRAZOLE SODIUM 40 MILLIGRAM(S): 40 INJECTION, POWDER, FOR SOLUTION INTRAVENOUS at 05:37

## 2024-07-17 RX ADMIN — AMIODARONE HYDROCHLORIDE 400 MILLIGRAM(S): 50 INJECTION, SOLUTION INTRAVENOUS at 05:37

## 2024-07-17 RX ADMIN — Medication 3 MILLILITER(S): at 11:32

## 2024-07-17 RX ADMIN — ASPIRIN 81 MILLIGRAM(S): 325 TABLET, FILM COATED ORAL at 11:34

## 2024-07-17 RX ADMIN — HEPARIN SODIUM 12.5 UNIT(S)/HR: 50 INJECTION, SOLUTION INTRAVENOUS at 00:51

## 2024-07-17 RX ADMIN — Medication 25 MILLIGRAM(S): at 11:34

## 2024-07-17 RX ADMIN — AMIODARONE HYDROCHLORIDE 400 MILLIGRAM(S): 50 INJECTION, SOLUTION INTRAVENOUS at 13:29

## 2024-07-17 NOTE — PROGRESS NOTE ADULT - SUBJECTIVE AND OBJECTIVE BOX
Subjective ' hello I am ok "    VITAL SIGNS    Telemetry:   afib     Vital Signs Last 24 Hrs  T(C): 36.7 (24 @ 05:32), Max: 36.9 (24 @ 20:10)  T(F): 98.1 (24 @ 05:32), Max: 98.4 (24 @ 20:10)  HR: 81 (24 @ 05:32) (65 - 90)  BP: 131/81 (24 @ 05:32) (123/73 - 142/94)  RR: 18 (24 @ 05:32) (18 - 18)  SpO2: 99% (24 @ 05:32) (94% - 99%)           15 @ 07:01  -  16 @ 07:00  --------------------------------------------------------  IN: 710 mL / OUT: 2800 mL / NET: -2090 mL     @ 07:01  -   @ 06:41  --------------------------------------------------------  IN: 1167.5 mL / OUT: 2175 mL / NET: -1007.5 mL    Daily Weight in k.9 (2024 08:43)                  < from: CT Angio Heart and Coronaries w/ IV Cont (24 @ 16:44) >    IMPRESSION: Enlargement of the aortic root at the sinuses of Valsalva   measuring up to about 5.1 cm as detailed above    CT coronary angiography shows: (Overall limited evaluation of the   coronary arteries due to motion artifact)  LMCA: Patent.  LAD: Patent although evaluation of the distal segment is limited.  LCx: Proximal segment appears patent. The distal segment, a very small   caliber vessel is difficult to evaluate. A prominent obtuse marginal   artery appears patent given the limitations of this study.    < end of copied text >  < from: US Thyroid (24 @ 09:26) >  IMPRESSION:  Complex, hypervascular right cystic and solid mass with scattered   internal calcifications measuring 5.2 x 3.4 x 3.6 cm.  TI-RAD 5: Highly suspicious (FNA if > 1 cm, Follow if > 0.5 cm)    Subcentimeter solid nodules in the left thyroid lobe, as described.  Prominent left L2 nodes.      < end of copied text >      MEDICATIONS  (STANDING):  aMIOdarone    Tablet   Oral   aMIOdarone    Tablet 400 milliGRAM(s) Oral every 8 hours  aspirin enteric coated 81 milliGRAM(s) Oral daily  heparin  Infusion 800 Unit(s)/Hr (12.5 mL/Hr) IV Continuous <Continuous>  metoprolol tartrate 25 milliGRAM(s) Oral every 12 hours  pantoprazole    Tablet 40 milliGRAM(s) Oral before breakfast  sodium chloride 0.9% lock flush 3 milliLiter(s) IV Push every 8 hours    MEDICATIONS  (PRN):  acetaminophen     Tablet .. 650 milliGRAM(s) Oral every 6 hours PRN Moderate Pain (4 - 6)  diphenhydrAMINE 25 milliGRAM(s) Oral every 4 hours PRN Rash and/or Itching  RCA: Dominant vessel, patent given the limitations of this study.    PHYSICAL EXAM        Neurology: alert and oriented x 3, nonfocal, no gross deficits    CV :S1irr      Lungs: b/l cta on room air     Abdomen: soft, nontender, nondistended, positive bowel sounds, + bm    :  voids             Extremities: warm well perfused equal strength throughout     b/l le + Dp warm well perfused  no edema                                       Physical Therapy Rec:   Home  [ x ]   Home w/ PT  [  ]  Rehab  [  ]    Discussed with Cardiothoracic Team at AM rounds.

## 2024-07-17 NOTE — PROGRESS NOTE ADULT - PROBLEM SELECTOR PROBLEM 1
Dilated aortic root
Hyperthyroidism
Hyperthyroidism
Dilated aortic root

## 2024-07-17 NOTE — PROGRESS NOTE ADULT - PROBLEM SELECTOR PLAN 1
admit to Dr Serrano   telemetry monitor  metorpolol 25 bid  cardiac surgery labs  7/14 ECHO- 2. Aortic root at the sinuses of Valsalva is aneurysmal, measuring 5.10 cm (indexed 2.05 cm/m²). Ascending aorta diameter is aneurysmal, measuring 4.80 cm   7/14 CTA chest  completed  7/16 Thyroid US   7/16 CT Cors IMPRESSION: Enlargement of the aortic root at the sinuses of Valsalva   measuring up to about 5.1 cm as detailed above  blood pressure management  NEEDS MRI  OR date TBD

## 2024-07-17 NOTE — DISCHARGE NOTE PROVIDER - NSDCMRMEDTOKEN_GEN_ALL_CORE_FT
acetaminophen 325 mg oral tablet: 2 tab(s) orally every 8 hours MDD: 01934 mg  amiodarone 200 mg oral tablet: 1 tab(s) orally once a day  Eliquis 2.5 mg oral tablet: 1 tab(s) orally 2 times a day  metoprolol succinate 50 mg oral tablet, extended release: 1 tab(s) orally once a day  pantoprazole 40 mg oral delayed release tablet: 1 tab(s) orally once a day (before a meal)   acetaminophen 325 mg oral tablet: 2 tab(s) orally every 8 hours MDD: 02245 mg  amiodarone 200 mg oral tablet: 1 tab(s) orally once a day  Eliquis 2.5 mg oral tablet: 1 tab(s) orally 2 times a day  metoprolol succinate 50 mg oral tablet, extended release: 1 tab(s) orally once a day  MiraLax oral powder for reconstitution: 17 gram(s) orally once a day  pantoprazole 40 mg oral delayed release tablet: 1 tab(s) orally once a day (before a meal)

## 2024-07-17 NOTE — DISCHARGE NOTE PROVIDER - NSDCCPCAREPLAN_GEN_ALL_CORE_FT
PRINCIPAL DISCHARGE DIAGNOSIS  Diagnosis: Dilated aortic root  Assessment and Plan of Treatment: 1. Daily Shower  2. Weigh yourself daily and notify any weight gain greater than 2-3 pounds in 24 hours.  3. Regular diet - low fat, low cholesterol, no added salt.  4. No HEAVY LIFTING  No STRAINING nothing greater than 5 pounds until cleared by MD.   5  Call / Notify MD any fever greater than 101.0  Take all medications as prescribed  Open air MRI for  pituitary/brain   Our office will call you  with date and time of follow up appointment  with Dr Serrano after MRI  completed  Follow up with Dr Munoz  endocrinologistcall for appointment [phone] will need FNA after cardiac surgery  Dr Clements neurology   CALL our office for fever chills or any concerns        PRINCIPAL DISCHARGE DIAGNOSIS  Diagnosis: Dilated aortic root  Assessment and Plan of Treatment: 1. Daily Shower  2. Weigh yourself daily and notify any weight gain greater than 2-3 pounds in 24 hours.  3. Regular diet - low fat, low cholesterol, no added salt.  4. No HEAVY LIFTING  No STRAINING nothing greater than 5 pounds until cleared by MD.   5  Call / Notify MD any fever greater than 101.0  Take all medications as prescribed  Open air MRI for  pituitary/brain   Our office will call you  with date and time of follow up appointment  with Dr Serrano after MRI  [facility]  Follow up with Dr Munoz  endocrinologistcall for appointment [phone] will need FNA after cardiac surgery  Dr Clements neurology   CALL our office for fever chills or any concerns        PRINCIPAL DISCHARGE DIAGNOSIS  Diagnosis: Dilated aortic root  Assessment and Plan of Treatment: 1. Daily Shower  2. Weigh yourself daily and notify any weight gain greater than 2-3 pounds in 24 hours.  3. Regular diet - low fat, low cholesterol, no added salt.  4. No HEAVY LIFTING  No STRAINING nothing greater than 5 pounds until cleared by MD.   5  Call / Notify MD any fever greater than 101.0  Take all medications as prescribed  Open air MRI for  pituitary/brain  ollow up appointment  with Dr Serrano  on 8/1  at 3: 30 pm  at [place] [id number]    MRI pituiatry and brain  - Order in [facility]  Follow up with Dr Munoz  endocrinologist call for appointment [phone] will need FNA after cardiac surgery  Dr Clements neurology     NO Stimulants  CALL our office for fever chills CP back pain  or any concerns

## 2024-07-17 NOTE — DISCHARGE NOTE PROVIDER - HOSPITAL COURSE
This is a 48 year old male with pmh of known aortic root dilatation who  presented to outside hospital for management of headache and  found to have aortic dilatation increased to 5.6 cm from 4.8 in 2019 and new onset atrial fibrillation.  Transferred for cardiac surgery evaluation.     7/14 VSS CP free in afib heparin gtt for AC echo/  CTA completed today  will need cardiac cath OR date TBD  7/15 CT CORS Ao root gated coronal and sagitals to be reconstructed off of gated CT pending. Endocrinology consulted for Graves Disease work up. Ultrasound thyroid. MRA head to evaluate patient complaint of HA and vision changes. Heparin ggt for afib  Coronary cath deferred for now. BP controlled  7/16 VSS afib up to 120 amiodarone load initiated Thyroid US today- Neurology consulted patient unable to tolerated MRI head CT non con CT cors today -   afib up to 120 amiodarone load initiated .  7/17 VSS CP free stable and eager  for discharge on eliquis 2.5 bid needs open air MRI pituitary/ head to  return for Cardiac surgery with Dr Serrano

## 2024-07-17 NOTE — PROGRESS NOTE ADULT - SUBJECTIVE AND OBJECTIVE BOX
Chief complaint  Patient is a 48y old  Male who presents with a chief complaint of cardiac surgery evaluation (17 Jul 2024 06:40)         Labs and Fingersticks  CAPILLARY BLOOD GLUCOSE          Anion Gap: 11 (07-17 @ 06:52)  Anion Gap: 11 (07-16 @ 05:51)      Calcium: 9.2 (07-17 @ 06:52)  Calcium: 9.1 (07-16 @ 05:51)  Albumin: 3.6 (07-17 @ 06:52)  Albumin: 3.6 (07-16 @ 05:51)    Alanine Aminotransferase (ALT/SGPT): 14 (07-17 @ 06:52)  Alanine Aminotransferase (ALT/SGPT): 10 (07-16 @ 05:51)  Alkaline Phosphatase: 86 (07-17 @ 06:52)  Alkaline Phosphatase: 92 (07-16 @ 05:51)  Aspartate Aminotransferase (AST/SGOT): 17 (07-17 @ 06:52)  Aspartate Aminotransferase (AST/SGOT): 12 (07-16 @ 05:51)        07-17    137  |  105  |  12  ----------------------------<  92  4.1   |  21<L>  |  0.76    Ca    9.2      17 Jul 2024 06:52    TPro  6.8  /  Alb  3.6  /  TBili  0.7  /  DBili  x   /  AST  17  /  ALT  14  /  AlkPhos  86  07-17                        14.2   4.31  )-----------( 171      ( 17 Jul 2024 06:51 )             42.5     Medications  MEDICATIONS  (STANDING):  aMIOdarone    Tablet   Oral   aMIOdarone    Tablet 400 milliGRAM(s) Oral every 8 hours  aspirin enteric coated 81 milliGRAM(s) Oral daily  heparin  Infusion 800 Unit(s)/Hr (12.5 mL/Hr) IV Continuous <Continuous>  metoprolol tartrate 25 milliGRAM(s) Oral every 12 hours  pantoprazole    Tablet 40 milliGRAM(s) Oral before breakfast  sodium chloride 0.9% lock flush 3 milliLiter(s) IV Push every 8 hours      Physical Exam  General: Patient comfortable in bed   Vital Signs Last 12 Hrs  T(F): 98.5 (07-17-24 @ 10:54), Max: 98.5 (07-17-24 @ 10:54)  HR: 84 (07-17-24 @ 13:29) (74 - 84)  BP: 119/83 (07-17-24 @ 13:29) (119/83 - 142/66)  BP(mean): 95 (07-17-24 @ 13:29) (95 - 98)  RR: 18 (07-17-24 @ 10:54) (18 - 18)  SpO2: 100% (07-17-24 @ 10:54) (99% - 100%)    CVS: S1S2   Respiratory: No wheezing, no crepitations  GI: Abdomen soft, bowel sounds positive  Musculoskeletal:  moves all extremities  : Voiding          Chief complaint  Patient is a 48y old  Male who presents with a chief complaint of cardiac surgery evaluation (17 Jul 2024 06:40)     Labs and Fingersticks  CAPILLARY BLOOD GLUCOSE          Anion Gap: 11 (07-17 @ 06:52)  Anion Gap: 11 (07-16 @ 05:51)      Calcium: 9.2 (07-17 @ 06:52)  Calcium: 9.1 (07-16 @ 05:51)  Albumin: 3.6 (07-17 @ 06:52)  Albumin: 3.6 (07-16 @ 05:51)    Alanine Aminotransferase (ALT/SGPT): 14 (07-17 @ 06:52)  Alanine Aminotransferase (ALT/SGPT): 10 (07-16 @ 05:51)  Alkaline Phosphatase: 86 (07-17 @ 06:52)  Alkaline Phosphatase: 92 (07-16 @ 05:51)  Aspartate Aminotransferase (AST/SGOT): 17 (07-17 @ 06:52)  Aspartate Aminotransferase (AST/SGOT): 12 (07-16 @ 05:51)        07-17    137  |  105  |  12  ----------------------------<  92  4.1   |  21<L>  |  0.76    Ca    9.2      17 Jul 2024 06:52    TPro  6.8  /  Alb  3.6  /  TBili  0.7  /  DBili  x   /  AST  17  /  ALT  14  /  AlkPhos  86  07-17                        14.2   4.31  )-----------( 171      ( 17 Jul 2024 06:51 )             42.5     Medications  MEDICATIONS  (STANDING):  aMIOdarone    Tablet   Oral   aMIOdarone    Tablet 400 milliGRAM(s) Oral every 8 hours  aspirin enteric coated 81 milliGRAM(s) Oral daily  heparin  Infusion 800 Unit(s)/Hr (12.5 mL/Hr) IV Continuous <Continuous>  metoprolol tartrate 25 milliGRAM(s) Oral every 12 hours  pantoprazole    Tablet 40 milliGRAM(s) Oral before breakfast  sodium chloride 0.9% lock flush 3 milliLiter(s) IV Push every 8 hours      Physical Exam  General: Patient comfortable in bed   Vital Signs Last 12 Hrs  T(F): 98.5 (07-17-24 @ 10:54), Max: 98.5 (07-17-24 @ 10:54)  HR: 84 (07-17-24 @ 13:29) (74 - 84)  BP: 119/83 (07-17-24 @ 13:29) (119/83 - 142/66)  BP(mean): 95 (07-17-24 @ 13:29) (95 - 98)  RR: 18 (07-17-24 @ 10:54) (18 - 18)  SpO2: 100% (07-17-24 @ 10:54) (99% - 100%)    CVS: S1S2   Respiratory: No wheezing, no crepitations  GI: Abdomen soft, bowel sounds positive  Musculoskeletal:  moves all extremities  : Voiding

## 2024-07-17 NOTE — PROGRESS NOTE ADULT - PROBLEM SELECTOR PLAN 1
Thyroid nodule needs FNA biopsy, can be done outpatient   Thyroid AB pending   Subclinical now, Suggest to repeat thyroid function test in 4-6 weeks. Outpatient follow up.  Headaches being worked up by neurology team/CTS, CT head

## 2024-07-17 NOTE — DISCHARGE NOTE NURSING/CASE MANAGEMENT/SOCIAL WORK - PATIENT PORTAL LINK FT
You can access the FollowMyHealth Patient Portal offered by [facility] by registering at the following website: [email or web address]. By joining [employer]’s FollowMyHealth portal, you will also be able to view your health information using other applications (apps) compatible with our system.

## 2024-07-17 NOTE — DISCHARGE NOTE PROVIDER - CARE PROVIDERS DIRECT ADDRESSES
[email or web address],[email or web address],DirectAddress_Unknown

## 2024-07-17 NOTE — PROGRESS NOTE ADULT - ASSESSMENT
48 year old male with pmh of known aortic root dilatation who  presented to outside hospital for management of headache and  found to have aortic dilatation increased to 5.6 cm from 4.8 in 2019 and new onset atrial fibrillation.  Found to have suppressed TSH levels     Assessment  Hyperthyroidism: TSH suppressed, having tachycardia, no thyroid dz history, Full TFT panel reviewed, Thyroid AB pending.    Free thyroxine came back normal 1.5 , subclinical.  Thyroid US with solid mass/nodule   < from: US Thyroid (07.16.24 @ 09:26) >  IMPRESSION:  Complex, hypervascular right cystic and solid mass with scattered   internal calcifications measuring 5.2 x 3.4 x 3.6 cm.  TI-RAD 5: Highly suspicious (FNA if > 1 cm, Follow if > 0.5 cm)    < end of copied text >    Aortic root dilation: on medications, CT surgery eval, monitored.  HTN: on antihypertensive medications, monitored, asymptomatic.      Discussed plan and management wit Dr Tammy Munoz MD  Cell: [phone]  Office: [phone]                  48 year old male with pmh of known aortic root dilatation who  presented to outside hospital for management of headache and  found to have aortic dilatation increased to 5.6 cm from 4.8 in 2019 and new onset atrial fibrillation.  Found to have suppressed TSH levels     Assessment  Hyperthyroidism: TSH suppressed, having tachycardia, no thyroid dz history, Full TFT panel reviewed, Thyroid AB pending.    Free thyroxine came back normal 1.5 , subclinical.  Thyroid US with solid mass/nodule   < from: US Thyroid (07.16.24 @ 09:26) >  IMPRESSION:  Complex, hypervascular right cystic and solid mass with scattered   internal calcifications measuring 5.2 x 3.4 x 3.6 cm.  TI-RAD 5: Highly suspicious (FNA if > 1 cm, Follow if > 0.5 cm)    < end of copied text >    Aortic root dilation: on medications, CT surgery eval, monitored.  HTN: on antihypertensive medications, monitored, asymptomatic.      Discussed plan and management wit Dr Tammy Munoz MD  Cell: [phone]  Office: [phone]

## 2024-07-17 NOTE — DISCHARGE NOTE PROVIDER - NSDCPNSUBOBJ_GEN_ALL_CORE
Axox3   afib   s1 irrr   lung cta  ab soft nontender + Bm voids  equal strength throughout   B/lle warm well perfused equal strength   b/lle + DP no edema no calf tenderness  T(C): 36.9 (07-17-24 @ 10:54), Max: 36.9 (07-16-24 @ 20:10)  T(F): 98.5 (07-17-24 @ 10:54), Max: 98.5 (07-17-24 @ 10:54)  HR: 84 (07-17-24 @ 13:29) (74 - 89)  BP: 119/83 (07-17-24 @ 13:29) (119/83 - 142/94)  RR: 18 (07-17-24 @ 10:54) (18 - 18)  SpO2: 100% (07-17-24 @ 10:54) (94% - 100%)                          14.2   4.31  )-----------( 171      ( 17 Jul 2024 06:51 )             42.5       07-17    137  |  105  |  12  ----------------------------<  92  4.1   |  21<L>  |  0.76    Ca    9.2      17 Jul 2024 06:52    TPro  6.8  /  Alb  3.6  /  TBili  0.7  /  DBili  x   /  AST  17  /  ALT  14  /  AlkPhos  86  07-17    < from: CT Angio Heart and Coronaries w/ IV Cont (07.16.24 @ 16:44) >    Coronary Calcium Score = 0 Agatston Units although evaluation is limited   due to cardiac motion artifact    Non-Coronary: For complete evaluation of the noncardiac portions of this   study, please refer to the recently performed dedicated chest CT of July 14, 2024.    Heart size appears enlarged. No pericardial effusion.    The aortic root at the sinuses of Valsalva is dilated.  Distance between the the left and the noncoronary sinuses of Valsalva is   about 5.1 cm.  Distance between the left and the right coronary sinuses of Valsalva is   about 4.5 cm.  Distance between the right and the noncoronary sinuses of Valsalva is   about 4.9 cm.    The ascending aorta at the level of the main pulmonary artery measures   about 3.9 x 4 cm and the descending thoracic aorta measures about 2.8 cm   as correlated with the prior recently performed CT angiogram of July 14, 2024. Please note there is motion artifact overlying the ascending aorta   on the current study. The aortic arch is not imaged on this cardiac CT   angiogram. The mid aspect of the aortic arch measures about 2.7 cm as   measured on the axial images as correlated with the prior exam of July 14, 2024.    Coronary:    Evaluation of the coronary arteries is limited due to motion artifact.    The left main coronary artery appears patent. The left main coronary   artery arises from the left sinotubular junction    The proximal and mid segments of the left anterior descending artery   appear patent. The distal segment of the left anterior descending artery   is difficult to evaluate although it is likely patent.    The proximal segment of the left circumflex artery appears patent. The   distal segment is a small-caliber vessel and cannot be evaluated. A   p< from: CT Angio Heart and Coronaries w/ IV Cont (07.16.24 @ 16:44) >      The right coronary artery, the dominant vessel appears patent given the   limitations of the study. The posterior descending artery appears patent.    IMPRESSION: Enlargement of the aortic root at the sinuses of Valsalva   measuring up to about 5.1 cm as detailed above    CT coronary angiography shows: (Overall limited evaluation of the   coronary arteries due to motion artifact)  LMCA: Patent.  LAD: Patent although evaluation of the distal segment is limited.  LCx: Proximal segment appears patent. The distal segment, a very small   caliber vessel is difficult to evaluate. A prominent obtuse marginal   artery appears patent given the limitations of this study.  RCA: Dominant vessel, patent given the limitations of this study.    rominent obtuse marginal artery appears patent given the limitations of   the study.      < end of copied text >      < from: US Thyroid (07.16.24 @ 09:26) >    FINDINGS:  Right Lobe: 6.3 cm x 4.4 cm x 3.6 cm. Complex, hypervascular cystic and   solid mass with scattered internal calcifications measuring 5.2 x 3.4 x   3.6 cm    Left Lobe: 4.9 cm x 1.5 cm x 1.7 cm. Normal echogenicity. 3 x 2 mm solid   hypoechoic nodule in the lateral lobe. Solid heterogeneous, hypervascular   9 x 6 mm nodule in the medial lobe.    Isthmus: 4 mm.    Cervical Lymph Nodes: Prominent left L2 nodes measuring 1.8 x 1.8 x 0.8   cm and 1.7 x 0.7 x 1.3 cm.    IMPRESSION:  Complex, hypervascular right cystic and solid mass with scattered   internal calcifications measuring 5.2 x 3.4 x 3.6 cm.  TI-RAD 5: Highly suspicious (FNA if > 1 cm, Follow if > 0.5 cm)    Subcentimeter solid nodules in the left thyroid lobe, as described.  Prominent left L2 nodes.    __________________________________  ACR Thyroid Imaging, Reporting and Data System (TI-RADS): White Paper of   the ACR TI-RADS Committee. J Am Luis Radiol 2017;14:587-595.    TI-RAD 1: Benign (No FNA)  TI-RAD 2: Not suspicious (No FNA)  TI-RAD 3: Mildly suspicious (FNA if > 2.5 cm, Follow if >1.5 cm)  TI-RAD 4: Moderately suspicious (FNA if > 1.5 cm, Follow if > 1 cm)  TI-RAD 5: Highly suspicious (FNA if > 1 cm, Follow if > 0.5 cm)      < end of copied text >        Greater then 45 minute  spent on discharge    Axox3   afib   s1 irrr   lung cta  ab soft nontender + Bm voids  equal strength throughout   B/lle warm well perfused equal strength   b/lle + DP no edema no calf tenderness  T(C): 36.9 (07-17-24 @ 10:54), Max: 36.9 (07-16-24 @ 20:10)  T(F): 98.5 (07-17-24 @ 10:54), Max: 98.5 (07-17-24 @ 10:54)  HR: 84 (07-17-24 @ 13:29) (74 - 89)  BP: 119/83 (07-17-24 @ 13:29) (119/83 - 142/94)  RR: 18 (07-17-24 @ 10:54) (18 - 18)  SpO2: 100% (07-17-24 @ 10:54) (94% - 100%)  EKG qtc 400             14.2   4.31  )-----------( 171      ( 17 Jul 2024 06:51 )             42.5       07-17    137  |  105  |  12  ----------------------------<  92  4.1   |  21<L>  |  0.76    Ca    9.2      17 Jul 2024 06:52    TPro  6.8  /  Alb  3.6  /  TBili  0.7  /  DBili  x   /  AST  17  /  ALT  14  /  AlkPhos  86  07-17    < from: CT Angio Heart and Coronaries w/ IV Cont (07.16.24 @ 16:44) >    Coronary Calcium Score = 0 Agatston Units although evaluation is limited   due to cardiac motion artifact    Non-Coronary: For complete evaluation of the noncardiac portions of this   study, please refer to the recently performed dedicated chest CT of July 14, 2024.    Heart size appears enlarged. No pericardial effusion.    The aortic root at the sinuses of Valsalva is dilated.  Distance between the the left and the noncoronary sinuses of Valsalva is   about 5.1 cm.  Distance between the left and the right coronary sinuses of Valsalva is   about 4.5 cm.  Distance between the right and the noncoronary sinuses of Valsalva is   about 4.9 cm.    The ascending aorta at the level of the main pulmonary artery measures   about 3.9 x 4 cm and the descending thoracic aorta measures about 2.8 cm   as correlated with the prior recently performed CT angiogram of July 14, 2024. Please note there is motion artifact overlying the ascending aorta   on the current study. The aortic arch is not imaged on this cardiac CT   angiogram. The mid aspect of the aortic arch measures about 2.7 cm as   measured on the axial images as correlated with the prior exam of July 14, 2024.    Coronary:    Evaluation of the coronary arteries is limited due to motion artifact.    The left main coronary artery appears patent. The left main coronary   artery arises from the left sinotubular junction    The proximal and mid segments of the left anterior descending artery   appear patent. The distal segment of the left anterior descending artery   is difficult to evaluate although it is likely patent.    The proximal segment of the left circumflex artery appears patent. The   distal segment is a small-caliber vessel and cannot be evaluated. A   p< from: CT Angio Heart and Coronaries w/ IV Cont (07.16.24 @ 16:44) >      The right coronary artery, the dominant vessel appears patent given the   limitations of the study. The posterior descending artery appears patent.    IMPRESSION: Enlargement of the aortic root at the sinuses of Valsalva   measuring up to about 5.1 cm as detailed above    CT coronary angiography shows: (Overall limited evaluation of the   coronary arteries due to motion artifact)  LMCA: Patent.  LAD: Patent although evaluation of the distal segment is limited.  LCx: Proximal segment appears patent. The distal segment, a very small   caliber vessel is difficult to evaluate. A prominent obtuse marginal   artery appears patent given the limitations of this study.  RCA: Dominant vessel, patent given the limitations of this study.    rominent obtuse marginal artery appears patent given the limitations of   the study.      < end of copied text >      < from: US Thyroid (07.16.24 @ 09:26) >    FINDINGS:  Right Lobe: 6.3 cm x 4.4 cm x 3.6 cm. Complex, hypervascular cystic and   solid mass with scattered internal calcifications measuring 5.2 x 3.4 x   3.6 cm    Left Lobe: 4.9 cm x 1.5 cm x 1.7 cm. Normal echogenicity. 3 x 2 mm solid   hypoechoic nodule in the lateral lobe. Solid heterogeneous, hypervascular   9 x 6 mm nodule in the medial lobe.    Isthmus: 4 mm.    Cervical Lymph Nodes: Prominent left L2 nodes measuring 1.8 x 1.8 x 0.8   cm and 1.7 x 0.7 x 1.3 cm.    IMPRESSION:  Complex, hypervascular right cystic and solid mass with scattered   internal calcifications measuring 5.2 x 3.4 x 3.6 cm.  TI-RAD 5: Highly suspicious (FNA if > 1 cm, Follow if > 0.5 cm)    Subcentimeter solid nodules in the left thyroid lobe, as described.  Prominent left L2 nodes.    __________________________________  ACR Thyroid Imaging, Reporting and Data System (TI-RADS): White Paper of   the ACR TI-RADS Committee. J Am Luis Radiol 2017;14:587-595.    TI-RAD 1: Benign (No FNA)  TI-RAD 2: Not suspicious (No FNA)  TI-RAD 3: Mildly suspicious (FNA if > 2.5 cm, Follow if >1.5 cm)  TI-RAD 4: Moderately suspicious (FNA if > 1.5 cm, Follow if > 1 cm)  TI-RAD 5: Highly suspicious (FNA if > 1 cm, Follow if > 0.5 cm)      < end of copied text >        Greater then 45 minute  spent on discharge

## 2024-07-17 NOTE — DISCHARGE NOTE PROVIDER - PROVIDER TOKENS
PROVIDER:[TOKEN:[[id number]],FOLLOWUP:[Routine]],PROVIDER:[TOKEN:[[id number]],FOLLOWUP:[Routine]],PROVIDER:[TOKEN:[[id number]],FOLLOWUP:[Routine]] PROVIDER:[TOKEN:[[id number]],SCHEDULEDAPPT:[08/01/2024],SCHEDULEDAPPTTIME:[03:30 PM]],PROVIDER:[TOKEN:[[id number]],FOLLOWUP:[Routine]],PROVIDER:[TOKEN:[[id number]],FOLLOWUP:[Routine]]

## 2024-07-17 NOTE — DISCHARGE NOTE PROVIDER - CARE PROVIDER_API CALL
Adam Serrano  Thoracic Surgery  [address] [place] [address]  Phone: [phone]  Fax: [phone]  Follow Up Time: Routine    Jf Munoz)  Endocrinology/Metab/Diabetes  [address] [place] [address]  Phone: [phone]  Fax: [phone]  Follow Up Time: Routine    Patricio Mina  Neurology  [address] [facility], [address]  [place] [address]  Phone: [phone]  Fax: [phone]  Follow Up Time: Routine   Adam Serrano  Thoracic Surgery  [address] [place] [address]  Phone: [phone]  Fax: [phone]  Scheduled Appointment: 08/01/2024 03:30 PM    Jf Munoz)  Endocrinology/Metab/Diabetes  [address] [place] [address]  Phone: [phone]  Fax: [phone]  Follow Up Time: Routine    Patricio Mina  Neurology  [address] [facility], [address]  [place] [address]  Phone: [phone]  Fax: [phone]  Follow Up Time: Routine

## 2024-07-17 NOTE — PROGRESS NOTE ADULT - ASSESSMENT
This is a 48 year old male with pmh of known aortic root dilatation who  presented to outside hospital for management of headache and  found to have aortic dilatation increased to 5.6 cm from 4.8 in 2019 and new onset atrial fibrillation.  Transferred for cardiac surgery evaluation.     7/14 VSS CP free in afib heparin gtt for AC echo/  CTA completed today  will need cardiac cath OR date TBD  7/15 CT CORS Ao root gated coronal and sagitals to be reconstructed off of gated CT pending. Endocrinology consulted for Graves Disease work up. Ultrasound thyroid. MRA head to evaluate patient complaint of HA and vision changes. Heparin ggt for afib  Coronary cath deferred for now. BP controlled  7/16 VSS afib up to 120 amiodarone load initiated Thyroid US today- Neurology consulted patient unable to tolerated MRI head CT non con CT cors today -   afib up to 120 amiodarone load initiated .

## 2024-07-17 NOTE — PROGRESS NOTE ADULT - PROBLEM SELECTOR PLAN 3
Dr Munoz following    Dr Mina following   CT Head noncontrast pending  Thyroid US< from: US Thyroid (07.16.24 @ 09:26) >  IMPRESSION:  Complex, hypervascular right cystic and solid mass with scattered   internal calcifications measuring 5.2 x 3.4 x 3.6 cm.  TI-RAD 5: Highly suspicious (FNA if > 1 cm, Follow if > 0.5 cm)  Outpatient FNA  Procalcitonin

## 2024-07-17 NOTE — DISCHARGE NOTE PROVIDER - NPI NUMBER (FOR SYSADMIN USE ONLY) :
----- Message from Beltran Portillo DO sent at 12/4/2023 10:05 AM EST -----  Labs all normal and c/w menopause [[id number]],[[id number]]

## 2024-07-17 NOTE — DISCHARGE NOTE PROVIDER - NSDCFUSCHEDAPPT_GEN_ALL_CORE_FT
Adam Serrano  [facility]  [facility]  Scheduled Appointment: 08/01/2024

## 2024-07-17 NOTE — PROGRESS NOTE ADULT - REASON FOR ADMISSION
cardiac surgery evaluation

## 2024-07-18 PROBLEM — F12.90 CANNABIS USE, UNSPECIFIED, UNCOMPLICATED: Chronic | Status: ACTIVE | Noted: 2024-07-14

## 2024-07-18 PROBLEM — I77.810 THORACIC AORTIC ECTASIA: Chronic | Status: ACTIVE | Noted: 2024-07-14

## 2024-07-18 LAB — PROLACTIN SERPL-MCNC: 20.4 NG/ML — HIGH (ref 4.1–18.4)

## 2024-07-19 LAB — TSI ACT/NOR SER: <0.1 IU/L — SIGNIFICANT CHANGE UP (ref 0–0.55)

## 2024-07-19 NOTE — CHART NOTE - NSCHARTNOTEFT_GEN_A_CORE
Post-Discharge Medication Review	  	  Patient's preferred pharmacy was updated in OMR: [facility]	  	  Patient contacted to offer medication counseling post-discharge. Medication reconciliation completed.     Per patient, medications include:	  1.	acetaminophen 325 mg oral tablet 2 tab(s) orally every 8 hours MDD: 4000 mg  2.	amiodarone 200 mg oral tablet 1 tab(s) orally once a day  3.	Eliquis 2.5 mg oral tablet 1 tab(s) orally 2 times a day  4.	metoprolol succinate 50 mg oral tablet, extended release 1 tab(s) orally once a day  	  Medications removed from OMR (updated per discussion with patient):	  1.	MiraLax oral powder for reconstitution 17 gram(s) orally once a day  2.	pantoprazole 40 mg oral delayed release tablet 1 tab(s) orally once a day (before a meal)  	  Medication name, indication, administration, side effect, and monitoring reviewed for new medications during post discharge counseling visit with patient. Patient demonstrated understanding. Counseling offered for all medications.	    Abhinav Jang, BitaD	  Clinical Pharmacy Specialist, Pharmacy Telehealth Team	  Can be reached via MS Teams or [phone]

## 2024-07-30 PROBLEM — Q25.43 ANEURYSM OF AORTIC ROOT: Status: ACTIVE | Noted: 2024-07-30

## 2024-07-30 RX ORDER — METOPROLOL SUCCINATE 50 MG/1
50 TABLET, EXTENDED RELEASE ORAL
Qty: 30 | Refills: 0 | Status: ACTIVE | COMMUNITY

## 2024-07-30 RX ORDER — APIXABAN 2.5 MG/1
2.5 TABLET, FILM COATED ORAL
Qty: 15 | Refills: 1 | Status: ACTIVE | COMMUNITY

## 2024-07-30 RX ORDER — AMIODARONE HYDROCHLORIDE 200 MG/1
200 TABLET ORAL DAILY
Qty: 15 | Refills: 0 | Status: ACTIVE | COMMUNITY

## 2024-08-01 ENCOUNTER — APPOINTMENT (OUTPATIENT)
Dept: CARDIOTHORACIC SURGERY | Facility: CLINIC | Age: 49
End: 2024-08-01

## 2024-08-01 VITALS
BODY MASS INDEX: 28.93 KG/M2 | SYSTOLIC BLOOD PRESSURE: 113 MMHG | TEMPERATURE: 98.2 F | HEIGHT: 78 IN | WEIGHT: 250 LBS | DIASTOLIC BLOOD PRESSURE: 62 MMHG | RESPIRATION RATE: 14 BRPM | OXYGEN SATURATION: 98 % | HEART RATE: 71 BPM

## 2024-08-01 DIAGNOSIS — F12.90 CANNABIS USE, UNSPECIFIED, UNCOMPLICATED: ICD-10-CM

## 2024-08-01 DIAGNOSIS — Z82.49 FAMILY HISTORY OF ISCHEMIC HEART DISEASE AND OTHER DISEASES OF THE CIRCULATORY SYSTEM: ICD-10-CM

## 2024-08-01 DIAGNOSIS — Z78.9 OTHER SPECIFIED HEALTH STATUS: ICD-10-CM

## 2024-08-01 DIAGNOSIS — Q25.43 CONGENITAL ANEURYSM OF AORTA: ICD-10-CM

## 2024-08-01 DIAGNOSIS — Z86.79 PERSONAL HISTORY OF OTHER DISEASES OF THE CIRCULATORY SYSTEM: ICD-10-CM

## 2024-08-01 PROCEDURE — 99204 OFFICE O/P NEW MOD 45 MIN: CPT

## 2024-08-01 RX ORDER — PANTOPRAZOLE 40 MG/1
40 TABLET, DELAYED RELEASE ORAL DAILY
Qty: 30 | Refills: 0 | Status: COMPLETED | COMMUNITY
End: 2024-08-01

## 2024-08-01 RX ORDER — LORATADINE 10 MG
17 TABLET,DISINTEGRATING ORAL DAILY
Refills: 1 | Status: COMPLETED | COMMUNITY
End: 2024-08-01

## 2024-08-05 NOTE — ASSESSMENT
[FreeTextEntry1] : This is a 48 year old male with pmh of known aortic root dilatation who presented to outside hospital for management of headache and  found to have aortic dilatation increased to 5.6 cm from 4.8 in 2019 and new onset atrial fibrillation.  Transferred for cardiac surgery evaluation.  7/15 CT CORS Ao root gated coronal and sagitals to be reconstructed off of gated CT pending. Endocrinology consulted for Graves Disease work up. Ultrasound thyroid. MRA head to evaluate patient complaint of HA and vision changes. Heparin ggt for afib  Coronary cath deferred for now. BP controlled   afib up to 120 amiodarone load initiated Thyroid US today- Neurology consulted patient unable to tolerated MRI head CT non con CT cors recommended, afib up to 120 amiodarone load initiated .  CP free stable and eager  for discharge on eliquis 2.5 bid needs open air MRI pituitary/ head to  return for Cardiac surgery with Dr Serrano.  Today he presents and reports that he had upper back pain radiating to his RT shoulder blade on/off , last episode yesterday which resolved by itself. He also has significant family history of Aortic dissection ( Uncle ? age unknown) and mother  from heart attack at the age of 40's . Denies any chest pain, shortness of breath, palpitations, dizziness or pedal edema.    Dr. Adam Serrano reviewed the cardiac imaging, medical records and reports with patient and discussed the case. 24 CTA Coronaries revealed Enlargement of the aortic root at the sinuses of Valsalva measuring up to about 5.1 cm The aortic root at the sinuses of Valsalva is dilated. Distance between the the left and the noncoronary sinuses of Valsalva is about 5.1 cm. Distance between the left and the right coronary sinuses of Valsalva is about 4.5 cm. Distance between the right and the noncoronary sinuses of Valsalva is about 4.9 cm. CT coronary angiography shows: (Overall limited evaluation of the coronary arteries due to motion artifact) LMCA: Patent. LAD: Patent although evaluation of the distal segment is limited. LCx: Proximal segment appears patent. The distal segment, a very small caliber vessel is difficult to evaluate. A prominent obtuse marginal artery appears patent given the limitations of this study. RCA: Dominant vessel, patent given the limitations of this study.    24 TTE revealed   1. Left ventricular cavity is normal in size. Left ventricular wall thickness is normal. Left ventricular systolic function is normal with an ejection fraction of 59 % by Nichole's method of disks. There are no regional wall motion abnormalities seen.  2. Aortic root at the sinuses of Valsalva is aneurysmal, measuring 5.10 cm (indexed 2.05 cm/m). Ascending aorta diameter is aneurysmal, measuring 4.80 cm (indexed 1.93 cm/m).  3. Trace aortic regurgitation.    Dr. Adam Serrano discussed the risks , benefits and alternatives to surgery. Risks included but not limited to  bleeding , stroke, Myocardial Infarction, kidney problems ,Blood transfusion ,permanent  pacemaker implantation,  infections and death. Dr. Adam Serrano  quoted a (default value ) operative mortality and complication risks. Dr. Adam Serrano also discussed the various approaches in detail. Dr. Adam Serrano feel that the patient will benefit and is a candidate for a Valve sparing aortic root replacement  . All questions and concerns were addressed and patient agrees to proceed with the plan.     *********************************************************************************************************************************************************************************************************************************************************************************************************************************************************************************************************   48-year-old male who presents for initial evaluation regarding an incidentally found aortic root aneurysm on TTE / CTA imaging [53mm aortic root 2024]. TTE showing likely trileaflet valve with only trace AI. Denies any chest / back pain or palpitations. Previous hospitalization for new onset afib; currently in sinus rhythm, on 2.5mg BID Eliquis. Denies dyspnea at rest or upon exertion. Denies family history of acute aortic syndrome or sudden death.   Aortic Indices: Ratio surface / height 11.14 Aortic size index [Size / body surface area]: 2.14   - Mr. Morrissey is a yong 48M without significant family history of acute aortic syndrome or sudden death who has a 53mm aortic root aneurysm. He meets indications [IIa] for prophylactic replacement of his aortic root. We will plan for valve-sparing aortic root replacement, with a bioprosthetic Bentall procedure as a backup operation of the leaflets are not amenable for repair.   1. We will call him the week of  to find a date for surgery.  2. He will require a [facility] the day prior to admission   3. Will stop his Eliquis 1 week prior to surgery  4. Given his young age and involvement of the aortic root, we will send him for genetic testing.   5. Given the presence of an aortic root aneurysm, we counseled his family screening for first degree relatives [his children will eventually receive a TTE through their PCP or cardiologist].  6. Should adhere to the aortic guidelines provided below in the interim and if he chooses not to proceed with surgery.   General lifestyle recommendations for patients with thoracic aortic disease: 1. Optimal blood pressure control with strict adherence to medications such as beta blockers, ACE inhibitors, ARB or other medications. 2. Healthy diet (low fat, low salt), regular aerobic exercise, and achieving ideal body weight which can help effectively achieve blood pressure goals. 3. Avoiding tobacco use, stimulating drugs, and severe emotional stress. 4. Aerobic exercise, particularly when heart rate and blood pressure are well controlled with medications, is beneficial overall. 5. Vigorous aerobic exercise (running, basketball, etc) should be avoided unless the patient undergoes a symptom-limited stress test to rule out hypertensive response to exercise. 6. Resistance training and any lifting that requires the patient to bear down should be avoided due to the risk of aortic rupture or dissection. 7. Avoiding sports where chest trauma is possible such as football, hockey, soccer, etc due to the possibility of aortic rupture or dissection due to trauma. 8. Avoiding work where heavy lifting is expected. 9. Adherence to prescribed aortic surveillance imaging. 10. Understanding of the critical importance of calling 911 or immediately going to the emergency room in case of the sudden onset of chest, back or abdominal pain, or the sudden development of an ischemic complication (stroke, limb weakness, etc) 11. Avoiding fluoroquinolone class of antibiotics.

## 2024-08-05 NOTE — ASSESSMENT
[FreeTextEntry1] : This is a 48 year old male with pmh of known aortic root dilatation who presented to outside hospital for management of headache and  found to have aortic dilatation increased to 5.6 cm from 4.8 in 2019 and new onset atrial fibrillation.  Transferred for cardiac surgery evaluation.  7/15 CT CORS Ao root gated coronal and sagitals to be reconstructed off of gated CT pending. Endocrinology consulted for Graves Disease work up. Ultrasound thyroid. MRA head to evaluate patient complaint of HA and vision changes. Heparin ggt for afib  Coronary cath deferred for now. BP controlled   afib up to 120 amiodarone load initiated Thyroid US today- Neurology consulted patient unable to tolerated MRI head CT non con CT cors recommended, afib up to 120 amiodarone load initiated .  CP free stable and eager  for discharge on eliquis 2.5 bid needs open air MRI pituitary/ head to  return for Cardiac surgery with Dr Serrano.  Today he presents and reports that he had upper back pain radiating to his RT shoulder blade on/off , last episode yesterday which resolved by itself. He also has significant family history of Aortic dissection ( Uncle ? age unknown) and mother  from heart attack at the age of 40's . Denies any chest pain, shortness of breath, palpitations, dizziness or pedal edema.    Dr. Adam Serrano reviewed the cardiac imaging, medical records and reports with patient and discussed the case. 24 CTA Coronaries revealed Enlargement of the aortic root at the sinuses of Valsalva measuring up to about 5.1 cm The aortic root at the sinuses of Valsalva is dilated. Distance between the the left and the noncoronary sinuses of Valsalva is about 5.1 cm. Distance between the left and the right coronary sinuses of Valsalva is about 4.5 cm. Distance between the right and the noncoronary sinuses of Valsalva is about 4.9 cm. CT coronary angiography shows: (Overall limited evaluation of the coronary arteries due to motion artifact) LMCA: Patent. LAD: Patent although evaluation of the distal segment is limited. LCx: Proximal segment appears patent. The distal segment, a very small caliber vessel is difficult to evaluate. A prominent obtuse marginal artery appears patent given the limitations of this study. RCA: Dominant vessel, patent given the limitations of this study.    24 TTE revealed   1. Left ventricular cavity is normal in size. Left ventricular wall thickness is normal. Left ventricular systolic function is normal with an ejection fraction of 59 % by Nichole's method of disks. There are no regional wall motion abnormalities seen.  2. Aortic root at the sinuses of Valsalva is aneurysmal, measuring 5.10 cm (indexed 2.05 cm/m). Ascending aorta diameter is aneurysmal, measuring 4.80 cm (indexed 1.93 cm/m).  3. Trace aortic regurgitation.    Dr. Adam Serrano discussed the risks , benefits and alternatives to surgery. Risks included but not limited to  bleeding , stroke, Myocardial Infarction, kidney problems ,Blood transfusion ,permanent  pacemaker implantation,  infections and death. Dr. Adam Serrano  quoted a (default value ) operative mortality and complication risks. Dr. Adam Serrano also discussed the various approaches in detail. Dr. Adam Serrano feel that the patient will benefit and is a candidate for a Valve sparing aortic root replacement  . All questions and concerns were addressed and patient agrees to proceed with the plan.     *********************************************************************************************************************************************************************************************************************************************************************************************************************************************************************************************************   48-year-old male who presents for initial evaluation regarding an incidentally found aortic root aneurysm on TTE / CTA imaging [53mm aortic root 2024]. TTE showing likely trileaflet valve with only trace AI. Denies any chest / back pain or palpitations. Previous hospitalization for new onset afib; currently in sinus rhythm, on 2.5mg BID Eliquis. Denies dyspnea at rest or upon exertion. Denies family history of acute aortic syndrome or sudden death.   Aortic Indices: Ratio surface / height 11.14 Aortic size index [Size / body surface area]: 2.14   - Mr. Morrissey is a yong 48M without significant family history of acute aortic syndrome or sudden death who has a 53mm aortic root aneurysm. He meets indications [IIa] for prophylactic replacement of his aortic root. We will plan for valve-sparing aortic root replacement, with a bioprosthetic Bentall procedure as a backup operation of the leaflets are not amenable for repair.   1. We will call him the week of  to find a date for surgery.  2. He will require a [facility] the day prior to admission   3. Will stop his Eliquis 1 week prior to surgery  4. Given his young age and involvement of the aortic root, we will send him for genetic testing.   5. Given the presence of an aortic root aneurysm, we counseled his family screening for first degree relatives [his children will eventually receive a TTE through their PCP or cardiologist].  6. Should adhere to the aortic guidelines provided below in the interim and if he chooses not to proceed with surgery.   General lifestyle recommendations for patients with thoracic aortic disease: 1. Optimal blood pressure control with strict adherence to medications such as beta blockers, ACE inhibitors, ARB or other medications. 2. Healthy diet (low fat, low salt), regular aerobic exercise, and achieving ideal body weight which can help effectively achieve blood pressure goals. 3. Avoiding tobacco use, stimulating drugs, and severe emotional stress. 4. Aerobic exercise, particularly when heart rate and blood pressure are well controlled with medications, is beneficial overall. 5. Vigorous aerobic exercise (running, basketball, etc) should be avoided unless the patient undergoes a symptom-limited stress test to rule out hypertensive response to exercise. 6. Resistance training and any lifting that requires the patient to bear down should be avoided due to the risk of aortic rupture or dissection. 7. Avoiding sports where chest trauma is possible such as football, hockey, soccer, etc due to the possibility of aortic rupture or dissection due to trauma. 8. Avoiding work where heavy lifting is expected. 9. Adherence to prescribed aortic surveillance imaging. 10. Understanding of the critical importance of calling 911 or immediately going to the emergency room in case of the sudden onset of chest, back or abdominal pain, or the sudden development of an ischemic complication (stroke, limb weakness, etc) 11. Avoiding fluoroquinolone class of antibiotics.

## 2024-08-05 NOTE — HISTORY OF PRESENT ILLNESS
[FreeTextEntry1] : This is a 48 year old male with pmh of known aortic root dilatation who presented to outside hospital for management of headache and  found to have aortic dilatation increased to 5.6 cm from 4.8 in 2019 and new onset atrial fibrillation.  Transferred for cardiac surgery evaluation.  7/15 CT CORS Ao root gated coronal and sagitals to be reconstructed off of gated CT pending. Endocrinology consulted for Graves Disease work up. Ultrasound thyroid. MRA head to evaluate patient complaint of HA and vision changes. Heparin ggt for afib  Coronary cath deferred for now. BP controlled   afib up to 120 amiodarone load initiated Thyroid US today- Neurology consulted patient unable to tolerated MRI head CT non con CT cors recommended, afib up to 120 amiodarone load initiated .  CP free stable and eager  for discharge on eliquis 2.5 bid needs open air MRI pituitary/ head to  return for Cardiac surgery with Dr Serrano.  Today he presents and reports that he had upper back pain radiating to his RT shoulder blade on/off , last episode yesterday which resolved by itself. He also has significant family history of Aortic dissection ( Uncle ? age unknown) and mother  from heart attack at the age of 40's . Denies any chest pain, shortness of breath, palpitations, dizziness or pedal edema.

## 2024-08-05 NOTE — CONSULT LETTER
[Dear  ___] : Dear  [unfilled], [Courtesy Letter:] : I had the pleasure of seeing your patient, [unfilled], in my office today. [Please see my note below.] : Please see my note below. [Consult Closing:] : Thank you very much for allowing me to participate in the care of this patient.  If you have any questions, please do not hesitate to contact me. [Sincerely,] : Sincerely, [FreeTextEntry3] :  Dr. Adam Serrano Attending Surgeon Department of Cardiovascular and Thoracic surgery [address] [place] [address] Tel: [phone]

## 2024-08-05 NOTE — END OF VISIT
[FreeTextEntry3] :  I, CHRIS Dubon , personally performed the evaluation and management (E/M) services for this established  patient. That E/M includes conducting the initial examination, assessing all conditions, and establishing the plan of care. Today, PIETRO VEGA  was here to observe my evaluation and management services for this patient.

## 2024-08-05 NOTE — DATA REVIEWED
[FreeTextEntry1] : 7/16/24 CTA Coronaries revealed Enlargement of the aortic root at the sinuses of Valsalva measuring up to about 5.1 cm The aortic root at the sinuses of Valsalva is dilated. Distance between the the left and the noncoronary sinuses of Valsalva is about 5.1 cm. Distance between the left and the right coronary sinuses of Valsalva is about 4.5 cm. Distance between the right and the noncoronary sinuses of Valsalva is about 4.9 cm. CT coronary angiography shows: (Overall limited evaluation of the coronary arteries due to motion artifact) LMCA: Patent. LAD: Patent although evaluation of the distal segment is limited. LCx: Proximal segment appears patent. The distal segment, a very small caliber vessel is difficult to evaluate. A prominent obtuse marginal artery appears patent given the limitations of this study. RCA: Dominant vessel, patent given the limitations of this study.    7/14/24 TTE revealed   1. Left ventricular cavity is normal in size. Left ventricular wall thickness is normal. Left ventricular systolic function is normal with an ejection fraction of 59 % by Nichole's method of disks. There are no regional wall motion abnormalities seen.  2. Aortic root at the sinuses of Valsalva is aneurysmal, measuring 5.10 cm (indexed 2.05 cm/m). Ascending aorta diameter is aneurysmal, measuring 4.80 cm (indexed 1.93 cm/m).  3. Trace aortic regurgitation.

## 2024-08-05 NOTE — CONSULT LETTER
[Dear  ___] : Dear  [unfilled], [Courtesy Letter:] : I had the pleasure of seeing your patient, [unfilled], in my office today. [Please see my note below.] : Please see my note below. [Consult Closing:] : Thank you very much for allowing me to participate in the care of this patient.  If you have any questions, please do not hesitate to contact me. [Sincerely,] : Sincerely, [FreeTextEntry3] :  Dr. Adam Serrano Attending Surgeon Department of Cardiovascular and Thoracic surgery [address] [place] [address] Tel: [phone]

## 2024-08-05 NOTE — ASSESSMENT
[FreeTextEntry1] : This is a 48 year old male with pmh of known aortic root dilatation who presented to outside hospital for management of headache and  found to have aortic dilatation increased to 5.6 cm from 4.8 in 2019 and new onset atrial fibrillation.  Transferred for cardiac surgery evaluation.  7/15 CT CORS Ao root gated coronal and sagitals to be reconstructed off of gated CT pending. Endocrinology consulted for Graves Disease work up. Ultrasound thyroid. MRA head to evaluate patient complaint of HA and vision changes. Heparin ggt for afib  Coronary cath deferred for now. BP controlled   afib up to 120 amiodarone load initiated Thyroid US today- Neurology consulted patient unable to tolerated MRI head CT non con CT cors recommended, afib up to 120 amiodarone load initiated .  CP free stable and eager  for discharge on eliquis 2.5 bid needs open air MRI pituitary/ head to  return for Cardiac surgery with Dr Serrano.  Today he presents and reports that he had upper back pain radiating to his RT shoulder blade on/off , last episode yesterday which resolved by itself. He also has significant family history of Aortic dissection ( Uncle ? age unknown) and mother  from heart attack at the age of 40's . Denies any chest pain, shortness of breath, palpitations, dizziness or pedal edema.    Dr. Adam Serrano reviewed the cardiac imaging, medical records and reports with patient and discussed the case. 24 CTA Coronaries revealed Enlargement of the aortic root at the sinuses of Valsalva measuring up to about 5.1 cm The aortic root at the sinuses of Valsalva is dilated. Distance between the the left and the noncoronary sinuses of Valsalva is about 5.1 cm. Distance between the left and the right coronary sinuses of Valsalva is about 4.5 cm. Distance between the right and the noncoronary sinuses of Valsalva is about 4.9 cm. CT coronary angiography shows: (Overall limited evaluation of the coronary arteries due to motion artifact) LMCA: Patent. LAD: Patent although evaluation of the distal segment is limited. LCx: Proximal segment appears patent. The distal segment, a very small caliber vessel is difficult to evaluate. A prominent obtuse marginal artery appears patent given the limitations of this study. RCA: Dominant vessel, patent given the limitations of this study.    24 TTE revealed   1. Left ventricular cavity is normal in size. Left ventricular wall thickness is normal. Left ventricular systolic function is normal with an ejection fraction of 59 % by Nichole's method of disks. There are no regional wall motion abnormalities seen.  2. Aortic root at the sinuses of Valsalva is aneurysmal, measuring 5.10 cm (indexed 2.05 cm/m). Ascending aorta diameter is aneurysmal, measuring 4.80 cm (indexed 1.93 cm/m).  3. Trace aortic regurgitation.    Dr. Adam Serrano discussed the risks , benefits and alternatives to surgery. Risks included but not limited to  bleeding , stroke, Myocardial Infarction, kidney problems ,Blood transfusion ,permanent  pacemaker implantation,  infections and death. Dr. Adam Serrano  quoted a (default value ) operative mortality and complication risks. Dr. Adam Serrano also discussed the various approaches in detail. Dr. Adam Serrano feel that the patient will benefit and is a candidate for a Valve sparing aortic root replacement  . All questions and concerns were addressed and patient agrees to proceed with the plan.     *********************************************************************************************************************************************************************************************************************************************************************************************************************************************************************************************************   48-year-old male who presents for initial evaluation regarding an incidentally found aortic root aneurysm on TTE / CTA imaging [53mm aortic root 2024]. TTE showing likely trileaflet valve with only trace AI. Denies any chest / back pain or palpitations. Previous hospitalization for new onset afib; currently in sinus rhythm, on 2.5mg BID Eliquis. Denies dyspnea at rest or upon exertion. Denies family history of acute aortic syndrome or sudden death.   Aortic Indices: Ratio surface / height 11.14 Aortic size index [Size / body surface area]: 2.14   - Mr. Morrissey is a yong 48M without significant family history of acute aortic syndrome or sudden death who has a 53mm aortic root aneurysm. He meets indications [IIa] for prophylactic replacement of his aortic root. We will plan for valve-sparing aortic root replacement, with a bioprosthetic Bentall procedure as a backup operation of the leaflets are not amenable for repair.   1. We will call him the week of  to find a date for surgery.  2. He will require a [facility] the day prior to admission   3. Will stop his Eliquis 1 week prior to surgery  4. Given his young age and involvement of the aortic root, we will send him for genetic testing.   5. Given the presence of an aortic root aneurysm, we counseled his family screening for first degree relatives [his children will eventually receive a TTE through their PCP or cardiologist].  6. Should adhere to the aortic guidelines provided below in the interim and if he chooses not to proceed with surgery.   General lifestyle recommendations for patients with thoracic aortic disease: 1. Optimal blood pressure control with strict adherence to medications such as beta blockers, ACE inhibitors, ARB or other medications. 2. Healthy diet (low fat, low salt), regular aerobic exercise, and achieving ideal body weight which can help effectively achieve blood pressure goals. 3. Avoiding tobacco use, stimulating drugs, and severe emotional stress. 4. Aerobic exercise, particularly when heart rate and blood pressure are well controlled with medications, is beneficial overall. 5. Vigorous aerobic exercise (running, basketball, etc) should be avoided unless the patient undergoes a symptom-limited stress test to rule out hypertensive response to exercise. 6. Resistance training and any lifting that requires the patient to bear down should be avoided due to the risk of aortic rupture or dissection. 7. Avoiding sports where chest trauma is possible such as football, hockey, soccer, etc due to the possibility of aortic rupture or dissection due to trauma. 8. Avoiding work where heavy lifting is expected. 9. Adherence to prescribed aortic surveillance imaging. 10. Understanding of the critical importance of calling 911 or immediately going to the emergency room in case of the sudden onset of chest, back or abdominal pain, or the sudden development of an ischemic complication (stroke, limb weakness, etc) 11. Avoiding fluoroquinolone class of antibiotics.

## 2024-08-05 NOTE — CONSULT LETTER
[Dear  ___] : Dear  [unfilled], [Courtesy Letter:] : I had the pleasure of seeing your patient, [unfilled], in my office today. [Please see my note below.] : Please see my note below. [Consult Closing:] : Thank you very much for allowing me to participate in the care of this patient.  If you have any questions, please do not hesitate to contact me. [Sincerely,] : Sincerely, [FreeTextEntry3] :  Dr. Adam Serrano Attending Surgeon Department of Cardiovascular and Thoracic surgery [address] [place] [address] Tel: [phone]

## 2024-09-06 ENCOUNTER — OUTPATIENT (OUTPATIENT)
Dept: OUTPATIENT SERVICES | Facility: HOSPITAL | Age: 49
LOS: 1 days | End: 2024-09-06
Payer: COMMERCIAL

## 2024-09-06 VITALS
HEIGHT: 78 IN | RESPIRATION RATE: 16 BRPM | OXYGEN SATURATION: 98 % | SYSTOLIC BLOOD PRESSURE: 119 MMHG | DIASTOLIC BLOOD PRESSURE: 80 MMHG | WEIGHT: 259.93 LBS | TEMPERATURE: 98 F | HEART RATE: 74 BPM

## 2024-09-06 DIAGNOSIS — Z01.818 ENCOUNTER FOR OTHER PREPROCEDURAL EXAMINATION: ICD-10-CM

## 2024-09-06 DIAGNOSIS — Q25.43 CONGENITAL ANEURYSM OF AORTA: ICD-10-CM

## 2024-09-06 DIAGNOSIS — K08.409 PARTIAL LOSS OF TEETH, UNSPECIFIED CAUSE, UNSPECIFIED CLASS: Chronic | ICD-10-CM

## 2024-09-06 DIAGNOSIS — I48.0 PAROXYSMAL ATRIAL FIBRILLATION: ICD-10-CM

## 2024-09-06 LAB
ALBUMIN SERPL ELPH-MCNC: 4.1 G/DL — SIGNIFICANT CHANGE UP (ref 3.3–5)
ALP SERPL-CCNC: 103 U/L — SIGNIFICANT CHANGE UP (ref 40–120)
ALT FLD-CCNC: 15 U/L — SIGNIFICANT CHANGE UP (ref 10–45)
ANION GAP SERPL CALC-SCNC: 15 MMOL/L — SIGNIFICANT CHANGE UP (ref 5–17)
AST SERPL-CCNC: 14 U/L — SIGNIFICANT CHANGE UP (ref 10–40)
BILIRUB SERPL-MCNC: 0.5 MG/DL — SIGNIFICANT CHANGE UP (ref 0.2–1.2)
BLD GP AB SCN SERPL QL: NEGATIVE — SIGNIFICANT CHANGE UP
BUN SERPL-MCNC: 14 MG/DL — SIGNIFICANT CHANGE UP (ref 7–23)
CALCIUM SERPL-MCNC: 9.6 MG/DL — SIGNIFICANT CHANGE UP (ref 8.4–10.5)
CHLORIDE SERPL-SCNC: 102 MMOL/L — SIGNIFICANT CHANGE UP (ref 96–108)
CO2 SERPL-SCNC: 23 MMOL/L — SIGNIFICANT CHANGE UP (ref 22–31)
CREAT SERPL-MCNC: 0.91 MG/DL — SIGNIFICANT CHANGE UP (ref 0.5–1.3)
EGFR: 105 ML/MIN/1.73M2 — SIGNIFICANT CHANGE UP
GLUCOSE SERPL-MCNC: 94 MG/DL — SIGNIFICANT CHANGE UP (ref 70–99)
POTASSIUM SERPL-MCNC: 4 MMOL/L — SIGNIFICANT CHANGE UP (ref 3.5–5.3)
POTASSIUM SERPL-SCNC: 4 MMOL/L — SIGNIFICANT CHANGE UP (ref 3.5–5.3)
PROT SERPL-MCNC: 7.6 G/DL — SIGNIFICANT CHANGE UP (ref 6–8.3)
RH IG SCN BLD-IMP: POSITIVE — SIGNIFICANT CHANGE UP
SODIUM SERPL-SCNC: 140 MMOL/L — SIGNIFICANT CHANGE UP (ref 135–145)

## 2024-09-06 PROCEDURE — 87640 STAPH A DNA AMP PROBE: CPT

## 2024-09-06 PROCEDURE — G0463: CPT

## 2024-09-06 PROCEDURE — 87641 MR-STAPH DNA AMP PROBE: CPT

## 2024-09-06 PROCEDURE — 93880 EXTRACRANIAL BILAT STUDY: CPT

## 2024-09-06 PROCEDURE — 93880 EXTRACRANIAL BILAT STUDY: CPT | Mod: 26

## 2024-09-06 PROCEDURE — 85025 COMPLETE CBC W/AUTO DIFF WBC: CPT

## 2024-09-06 PROCEDURE — 86900 BLOOD TYPING SEROLOGIC ABO: CPT

## 2024-09-06 PROCEDURE — 80053 COMPREHEN METABOLIC PANEL: CPT

## 2024-09-06 PROCEDURE — 86850 RBC ANTIBODY SCREEN: CPT

## 2024-09-06 PROCEDURE — 83036 HEMOGLOBIN GLYCOSYLATED A1C: CPT

## 2024-09-06 PROCEDURE — 84443 ASSAY THYROID STIM HORMONE: CPT

## 2024-09-06 PROCEDURE — 86901 BLOOD TYPING SEROLOGIC RH(D): CPT

## 2024-09-06 RX ORDER — SODIUM CHLORIDE 9 MG/ML
3 INJECTION INTRAMUSCULAR; INTRAVENOUS; SUBCUTANEOUS EVERY 8 HOURS
Refills: 0 | Status: DISCONTINUED | OUTPATIENT
Start: 2024-09-10 | End: 2024-09-15

## 2024-09-06 NOTE — H&P PST ADULT - ATTENDING COMMENTS
48M w/incidentally found aneurysm of his aortic root [56mm] and ascending aorta as well as new-onset atrial fibrillation [sinus rhythm since that admission].      - Plan for valve-sparing aortic root replacement [bioprosthetic valve-graft conduit replacement as backup], replacement of his ascending aorta and transverse hemiarch, Encompass ablation and left atrial clip on 9/10/2024.     - To CTICU following case    Adam Serrano MD  Cardiac Surgery

## 2024-09-06 NOTE — H&P PST ADULT - PROBLEM SELECTOR PLAN 1
planned for Valve sparing Aortic valve replacement , replacement of Ascending aorta, Transverse Hemiarch on 9/10/2024 w/Dr. Serrano   PST labs send  preprocedure surgical scrub instructions discussed

## 2024-09-06 NOTE — H&P PST ADULT - HISTORY OF PRESENT ILLNESS
48 year old male with pmh of known aortic root dilatation / aortic dilatation increased to 5.6 cm from 4.8 in 2019 and new onset atrial fibrillation x1 von 7/2024 was on amiodarone and Eliquis, newly  diagnosed hyperthyroidism/ Graves Disease not on meds ( saw endo during the hospitalization 7/2024 pending work ups after heart surgery as per pt), high prolactin level 7/2024   ?  Coronary cath deferred for now. BP controlled 7/16 afib up to 120 amiodarone load initiated Thyroid US today- Neurology consulted patient unable to tolerated MRI head CT non con CT cors recommended, afib up to 120 amiodarone load initiated . 7/17 CP free stable and eager for discharge on eliquis 2.5 bid needs open air MRI pituitary/ head to return for Cardiac surgery with Dr Serrano.    high prolactine level during the hospitlization  48 year old male with PMH of known aortic root dilatation / aortic dilatation increased to 5.6 cm from 4.8 in 2019 and new onset atrial fibrillation hospitalization on 7/2024 was on amiodarone and Eliquis ( stopped as per Dr. Serrano as per pt), newly  diagnosed hyperthyroidism 7/2024/ Graves Disease not on meds ( asymptomatic/ saw endo 7/30/2024 pending work ups after heart surgery as per pt), high prolactin level 7/2024 followed by Neurology ( pending open air MRI 2/2 claustrophobia and further work up after open heart surgery as per pt), planned for Valve sparing Aortic valve replacement , replacement of Ascending aorta, Transverse Hemiarch on 9/10/2024 w/Dr. Serrano       carotid doppler ordered as per [facility]  Case discussed with [facility] NP Robin Steward to follow    48 year old male with PMH of known aortic root dilatation / aortic dilatation increased to 5.6 cm from 4.8 in 2019 and new onset atrial fibrillation hospitalization on 7/2024 was on amiodarone and Eliquis ( stopped as per Dr. Serrano as per pt, only on metoprolol), newly  diagnosed hyperthyroidism 7/2024/ Graves Disease not on meds ( asymptomatic/ saw endo 7/30/2024 pending work ups after heart surgery as per pt), high prolactin level 7/2024 followed by Neurology ( pending open air MRI 2/2 claustrophobia and further work up after open heart surgery as per pt), planned for Valve sparing Aortic valve replacement , replacement of Ascending aorta, Transverse Hemiarch on 9/10/2024 w/Dr. Serrano       carotid doppler ordered as per [facility]  Case discussed with [facility] NP Robin Steward to follow    48 year old male with PMH of known aortic root dilatation / aortic dilatation increased to 5.6 cm from 4.8 in 2019 and new onset atrial fibrillation hospitalization on 7/2024 was on amiodarone and Eliquis ( stopped as per Dr. Serrano as per pt, only on metoprolol), newly  diagnosed hyperthyroidism 7/2024/ Graves Disease not on meds ( asymptomatic/ saw endo 7/30/2024 pending work ups after heart surgery as per pt), high prolactin level 7/2024 followed by Neurology ( pending open air MRI 2/2 claustrophobia and further work up after open heart surgery as per pt), planned for Valve sparing Aortic valve replacement , replacement of Ascending aorta, Transverse Hemiarch on 9/10/2024 w/Dr. Serrano       carotid doppler ordered as per [facility]  Case discussed with [facility] NP Robin Steward to follow ( via telephone

## 2024-09-06 NOTE — H&P PST ADULT - ASSESSMENT
DASI score: 8.9   DASIactivity: walking/active w/o cp/SOB  loose teeth or dentures: denies   airway mp2    CAPRINI SCORE    AGE RELATED RISK FACTORS                                                             [ x] Age 41-60 years                                            (1 Point)  [ ] Age: 61-74 years                                           (2 Points)                 [ ] Age= 75 years                                                (3 Points)             DISEASE RELATED RISK FACTORS                                                       [ ] Edema in the lower extremities                 (1 Point)                     [ ] Varicose veins                                               (1 Point)                                 [ ] BMI > 25 Kg/m2                                            (1 Point)                                  [ ] Serious infection (ie PNA)                            (1 Point)                     [ ] Lung disease ( COPD, Emphysema)            (1 Point)                                                                          [ ] Acute myocardial infarction                         (1 Point)                  [ ] Congestive heart failure (in the previous month)  (1 Point)         [ ] Inflammatory bowel disease                            (1 Point)                  [ ] Central venous access, PICC or Port               (2 points)       (within the last month)                                                                [ ] Stroke (in the previous month)                        (5 Points)    [ ] Previous or present malignancy                       (2 points)                                                                                                                                                         HEMATOLOGY RELATED FACTORS                                                         [ ] Prior episodes of VTE                                     (3 Points)                     [ ] Positive family history for VTE                      (3 Points)                  [ ] Prothrombin 97886 A                                     (3 Points)                     [ ] Factor V Leiden                                                (3 Points)                        [ ] Lupus anticoagulants                                      (3 Points)                                                           [ ] Anticardiolipin antibodies                              (3 Points)                                                       [ ] High homocysteine in the blood                   (3 Points)                                             [ ] Other congenital or acquired thrombophilia      (3 Points)                                                [ ] Heparin induced thrombocytopenia                  (3 Points)                                        MOBILITY RELATED FACTORS  [ ] Bed rest                                                         (1 Point)  [ ] Plaster cast                                                    (2 points)  [ ] Bed bound for more than 72 hours           (2 Points)    GENDER SPECIFIC FACTORS  [ ] Pregnancy or had a baby within the last month   (1 Point)  [ ] Post-partum < 6 weeks                                   (1 Point)  [ ] Hormonal therapy  or oral contraception   (1 Point)  [ ] History of pregnancy complications              (1 point)  [ ] Unexplained or recurrent              (1 Point)    OTHER RISK FACTORS                                           (1 Point)  [ ] BMI >40, smoking, diabetes requiring insulin, chemotherapy  blood transfusions and length of surgery over 2 hours    SURGERY RELATED RISK FACTORS  [ ]  Section within the last month     (1 Point)  [ ] Minor surgery                                                  (1 Point)  [ ] Arthroscopic surgery                                       (2 Points)  [ ] Planned major surgery lasting more            (2 Points)      than 45 minutes     [ ] Elective hip or knee joint replacement       (5 points)       surgery                                                TRAUMA RELATED RISK FACTORS  [ ] Fracture of the hip, pelvis, or leg                       (5 Points)  [ ] Spinal cord injury resulting in paralysis             (5 points)       (in the previous month)    [ ] Paralysis  (less than 1 month)                             (5 Points)  [ ] Multiple Trauma within 1 month                        (5 Points)    Total Score [        ]    Caprini Score 0-2: Low Risk, NO VTE prophylaxis required for most patients, encourage ambulation  Caprini Score 3-6: Moderate Risk , pharmacologic VTE prophylaxis is indicated for most patients (in the absence of contraindications)  Caprini Score Greater than or =7: High risk, pharmocologic VTE prophylaxis indicated for most patients (in the absence of contraindications)                               DASI score: 8.9   DASIactivity: walking/active w/o cp/SOB  loose teeth or dentures: denies   airway mp2    CAPRINI SCORE    AGE RELATED RISK FACTORS                                                             [ x] Age 41-60 years                                            (1 Point)  [ ] Age: 61-74 years                                           (2 Points)                 [ ] Age= 75 years                                                (3 Points)             DISEASE RELATED RISK FACTORS                                                       [ ] Edema in the lower extremities                 (1 Point)                     [ ] Varicose veins                                               (1 Point)                                 [ x] BMI > 25 Kg/m2                                            (1 Point)                                  [ ] Serious infection (ie PNA)                            (1 Point)                     [ ] Lung disease ( COPD, Emphysema)            (1 Point)                                                                          [ ] Acute myocardial infarction                         (1 Point)                  [ ] Congestive heart failure (in the previous month)  (1 Point)         [ ] Inflammatory bowel disease                            (1 Point)                  [ ] Central venous access, PICC or Port               (2 points)       (within the last month)                                                                [ ] Stroke (in the previous month)                        (5 Points)    [ ] Previous or present malignancy                       (2 points)                                                                                                                                                         HEMATOLOGY RELATED FACTORS                                                         [ ] Prior episodes of VTE                                     (3 Points)                     [ ] Positive family history for VTE                      (3 Points)                  [ ] Prothrombin 68202 A                                     (3 Points)                     [ ] Factor V Leiden                                                (3 Points)                        [ ] Lupus anticoagulants                                      (3 Points)                                                           [ ] Anticardiolipin antibodies                              (3 Points)                                                       [ ] High homocysteine in the blood                   (3 Points)                                             [ ] Other congenital or acquired thrombophilia      (3 Points)                                                [ ] Heparin induced thrombocytopenia                  (3 Points)                                        MOBILITY RELATED FACTORS  [ ] Bed rest                                                         (1 Point)  [ ] Plaster cast                                                    (2 points)  [ ] Bed bound for more than 72 hours           (2 Points)    GENDER SPECIFIC FACTORS  [ ] Pregnancy or had a baby within the last month   (1 Point)  [ ] Post-partum < 6 weeks                                   (1 Point)  [ ] Hormonal therapy  or oral contraception   (1 Point)  [ ] History of pregnancy complications              (1 point)  [ ] Unexplained or recurrent              (1 Point)    OTHER RISK FACTORS                                           (1 Point)  [ ] BMI >40, smoking, diabetes requiring insulin, chemotherapy  blood transfusions and length of surgery over 2 hours    SURGERY RELATED RISK FACTORS  [ ]  Section within the last month     (1 Point)  [ ] Minor surgery                                                  (1 Point)  [ ] Arthroscopic surgery                                       (2 Points)  [ x] Planned major surgery lasting more            (2 Points)      than 45 minutes     [ ] Elective hip or knee joint replacement       (5 points)       surgery                                                TRAUMA RELATED RISK FACTORS  [ ] Fracture of the hip, pelvis, or leg                       (5 Points)  [ ] Spinal cord injury resulting in paralysis             (5 points)       (in the previous month)    [ ] Paralysis  (less than 1 month)                             (5 Points)  [ ] Multiple Trauma within 1 month                        (5 Points)    Total Score [     4   ]    Caprini Score 0-2: Low Risk, NO VTE prophylaxis required for most patients, encourage ambulation  Caprini Score 3-6: Moderate Risk , pharmacologic VTE prophylaxis is indicated for most patients (in the absence of contraindications)  Caprini Score Greater than or =7: High risk, pharmocologic VTE prophylaxis indicated for most patients (in the absence of contraindications)

## 2024-09-06 NOTE — H&P PST ADULT - OTHER CARE PROVIDERS
[facility]  Patricio Mina [phone] last visit 7/2024, Dr. Jf lewis 7/2024 [facility]  Patricio Mina [phone] last visit 7/2024, Dr. Jf lewis 7/30/2024

## 2024-09-06 NOTE — H&P PST ADULT - NSANTHOSAYNRD_GEN_A_CORE
No. CUAUHTEMOC screening performed.  STOP BANG Legend: 0-2 = LOW Risk; 3-4 = INTERMEDIATE Risk; 5-8 = HIGH Risk

## 2024-09-07 LAB
A1C WITH ESTIMATED AVERAGE GLUCOSE RESULT: 6.2 % — HIGH (ref 4–5.6)
BASOPHILS # BLD AUTO: 0.01 K/UL — SIGNIFICANT CHANGE UP (ref 0–0.2)
BASOPHILS NFR BLD AUTO: 0.2 % — SIGNIFICANT CHANGE UP (ref 0–2)
EOSINOPHIL # BLD AUTO: 0.19 K/UL — SIGNIFICANT CHANGE UP (ref 0–0.5)
EOSINOPHIL NFR BLD AUTO: 4 % — SIGNIFICANT CHANGE UP (ref 0–6)
ESTIMATED AVERAGE GLUCOSE: 131 MG/DL — HIGH (ref 68–114)
HCT VFR BLD CALC: 46.3 % — SIGNIFICANT CHANGE UP (ref 39–50)
HGB BLD-MCNC: 15.1 G/DL — SIGNIFICANT CHANGE UP (ref 13–17)
IMM GRANULOCYTES NFR BLD AUTO: 0.2 % — SIGNIFICANT CHANGE UP (ref 0–0.9)
LYMPHOCYTES # BLD AUTO: 2.4 K/UL — SIGNIFICANT CHANGE UP (ref 1–3.3)
LYMPHOCYTES # BLD AUTO: 50.7 % — HIGH (ref 13–44)
MCHC RBC-ENTMCNC: 25.5 PG — LOW (ref 27–34)
MCHC RBC-ENTMCNC: 32.6 GM/DL — SIGNIFICANT CHANGE UP (ref 32–36)
MCV RBC AUTO: 78.3 FL — LOW (ref 80–100)
MONOCYTES # BLD AUTO: 0.45 K/UL — SIGNIFICANT CHANGE UP (ref 0–0.9)
MONOCYTES NFR BLD AUTO: 9.5 % — SIGNIFICANT CHANGE UP (ref 2–14)
MRSA PCR RESULT.: SIGNIFICANT CHANGE UP
NEUTROPHILS # BLD AUTO: 1.67 K/UL — LOW (ref 1.8–7.4)
NEUTROPHILS NFR BLD AUTO: 35.4 % — LOW (ref 43–77)
PLATELET # BLD AUTO: 176 K/UL — SIGNIFICANT CHANGE UP (ref 150–400)
RBC # BLD: 5.91 M/UL — HIGH (ref 4.2–5.8)
RBC # FLD: 17.2 % — HIGH (ref 10.3–14.5)
S AUREUS DNA NOSE QL NAA+PROBE: DETECTED
TSH SERPL-MCNC: <0.01 UIU/ML — LOW (ref 0.27–4.2)
WBC # BLD: 4.73 K/UL — SIGNIFICANT CHANGE UP (ref 3.8–10.5)
WBC # FLD AUTO: 4.73 K/UL — SIGNIFICANT CHANGE UP (ref 3.8–10.5)

## 2024-09-09 ENCOUNTER — INPATIENT (INPATIENT)
Facility: HOSPITAL | Age: 49
LOS: 5 days | Discharge: HOME | DRG: 219 | End: 2024-09-15
Attending: STUDENT IN AN ORGANIZED HEALTH CARE EDUCATION/TRAINING PROGRAM | Admitting: STUDENT IN AN ORGANIZED HEALTH CARE EDUCATION/TRAINING PROGRAM
Payer: COMMERCIAL

## 2024-09-09 VITALS
OXYGEN SATURATION: 98 % | WEIGHT: 258.16 LBS | HEART RATE: 88 BPM | TEMPERATURE: 98 F | RESPIRATION RATE: 18 BRPM | SYSTOLIC BLOOD PRESSURE: 122 MMHG | HEIGHT: 78 IN | DIASTOLIC BLOOD PRESSURE: 87 MMHG

## 2024-09-09 DIAGNOSIS — Q25.43 CONGENITAL ANEURYSM OF AORTA: ICD-10-CM

## 2024-09-09 DIAGNOSIS — K08.409 PARTIAL LOSS OF TEETH, UNSPECIFIED CAUSE, UNSPECIFIED CLASS: Chronic | ICD-10-CM

## 2024-09-09 PROBLEM — I71.20 THORACIC AORTIC ANEURYSM, WITHOUT RUPTURE, UNSPECIFIED: Chronic | Status: ACTIVE | Noted: 2024-09-06

## 2024-09-09 PROBLEM — E04.1 NONTOXIC SINGLE THYROID NODULE: Chronic | Status: ACTIVE | Noted: 2024-09-06

## 2024-09-09 PROBLEM — E05.90 THYROTOXICOSIS, UNSPECIFIED WITHOUT THYROTOXIC CRISIS OR STORM: Chronic | Status: ACTIVE | Noted: 2024-09-06

## 2024-09-09 PROBLEM — Z86.79 PERSONAL HISTORY OF OTHER DISEASES OF THE CIRCULATORY SYSTEM: Chronic | Status: ACTIVE | Noted: 2024-09-06

## 2024-09-09 PROBLEM — Z87.898 PERSONAL HISTORY OF OTHER SPECIFIED CONDITIONS: Chronic | Status: ACTIVE | Noted: 2024-09-06

## 2024-09-09 PROBLEM — I48.0 PAROXYSMAL ATRIAL FIBRILLATION: Chronic | Status: ACTIVE | Noted: 2024-09-06

## 2024-09-09 PROBLEM — J45.909 UNSPECIFIED ASTHMA, UNCOMPLICATED: Chronic | Status: ACTIVE | Noted: 2024-09-06

## 2024-09-09 PROBLEM — R79.89 OTHER SPECIFIED ABNORMAL FINDINGS OF BLOOD CHEMISTRY: Chronic | Status: ACTIVE | Noted: 2024-09-06

## 2024-09-09 PROBLEM — E05.00 THYROTOXICOSIS WITH DIFFUSE GOITER WITHOUT THYROTOXIC CRISIS OR STORM: Chronic | Status: ACTIVE | Noted: 2024-09-06

## 2024-09-09 PROBLEM — E04.9 NONTOXIC GOITER, UNSPECIFIED: Chronic | Status: ACTIVE | Noted: 2024-09-06

## 2024-09-09 LAB
ALBUMIN SERPL ELPH-MCNC: 3.9 G/DL — SIGNIFICANT CHANGE UP (ref 3.3–5)
ALP SERPL-CCNC: 86 U/L — SIGNIFICANT CHANGE UP (ref 40–120)
ALT FLD-CCNC: 12 U/L — SIGNIFICANT CHANGE UP (ref 10–45)
ANION GAP SERPL CALC-SCNC: 11 MMOL/L — SIGNIFICANT CHANGE UP (ref 5–17)
APPEARANCE UR: CLEAR — SIGNIFICANT CHANGE UP
AST SERPL-CCNC: 13 U/L — SIGNIFICANT CHANGE UP (ref 10–40)
BASOPHILS # BLD AUTO: 0.01 K/UL — SIGNIFICANT CHANGE UP (ref 0–0.2)
BASOPHILS NFR BLD AUTO: 0.2 % — SIGNIFICANT CHANGE UP (ref 0–2)
BILIRUB SERPL-MCNC: 0.4 MG/DL — SIGNIFICANT CHANGE UP (ref 0.2–1.2)
BILIRUB UR-MCNC: NEGATIVE — SIGNIFICANT CHANGE UP
BLD GP AB SCN SERPL QL: NEGATIVE — SIGNIFICANT CHANGE UP
BUN SERPL-MCNC: 13 MG/DL — SIGNIFICANT CHANGE UP (ref 7–23)
CALCIUM SERPL-MCNC: 9.3 MG/DL — SIGNIFICANT CHANGE UP (ref 8.4–10.5)
CHLORIDE SERPL-SCNC: 103 MMOL/L — SIGNIFICANT CHANGE UP (ref 96–108)
CO2 SERPL-SCNC: 26 MMOL/L — SIGNIFICANT CHANGE UP (ref 22–31)
COLOR SPEC: YELLOW — SIGNIFICANT CHANGE UP
CREAT SERPL-MCNC: 0.9 MG/DL — SIGNIFICANT CHANGE UP (ref 0.5–1.3)
DIFF PNL FLD: NEGATIVE — SIGNIFICANT CHANGE UP
EGFR: 107 ML/MIN/1.73M2 — SIGNIFICANT CHANGE UP
EOSINOPHIL # BLD AUTO: 0.16 K/UL — SIGNIFICANT CHANGE UP (ref 0–0.5)
EOSINOPHIL NFR BLD AUTO: 3.1 % — SIGNIFICANT CHANGE UP (ref 0–6)
GLUCOSE SERPL-MCNC: 94 MG/DL — SIGNIFICANT CHANGE UP (ref 70–99)
GLUCOSE UR QL: NEGATIVE MG/DL — SIGNIFICANT CHANGE UP
HCT VFR BLD CALC: 39.6 % — SIGNIFICANT CHANGE UP (ref 39–50)
HGB BLD-MCNC: 13.3 G/DL — SIGNIFICANT CHANGE UP (ref 13–17)
IMM GRANULOCYTES NFR BLD AUTO: 0.2 % — SIGNIFICANT CHANGE UP (ref 0–0.9)
KETONES UR-MCNC: ABNORMAL MG/DL
LEUKOCYTE ESTERASE UR-ACNC: NEGATIVE — SIGNIFICANT CHANGE UP
LYMPHOCYTES # BLD AUTO: 2.81 K/UL — SIGNIFICANT CHANGE UP (ref 1–3.3)
LYMPHOCYTES # BLD AUTO: 55.1 % — HIGH (ref 13–44)
MCHC RBC-ENTMCNC: 25.8 PG — LOW (ref 27–34)
MCHC RBC-ENTMCNC: 33.6 GM/DL — SIGNIFICANT CHANGE UP (ref 32–36)
MCV RBC AUTO: 76.7 FL — LOW (ref 80–100)
MONOCYTES # BLD AUTO: 0.56 K/UL — SIGNIFICANT CHANGE UP (ref 0–0.9)
MONOCYTES NFR BLD AUTO: 11 % — SIGNIFICANT CHANGE UP (ref 2–14)
NEUTROPHILS # BLD AUTO: 1.55 K/UL — LOW (ref 1.8–7.4)
NEUTROPHILS NFR BLD AUTO: 30.4 % — LOW (ref 43–77)
NITRITE UR-MCNC: NEGATIVE — SIGNIFICANT CHANGE UP
NRBC # BLD: 0 /100 WBCS — SIGNIFICANT CHANGE UP (ref 0–0)
PH UR: 5.5 — SIGNIFICANT CHANGE UP (ref 5–8)
PLATELET # BLD AUTO: 155 K/UL — SIGNIFICANT CHANGE UP (ref 150–400)
POTASSIUM SERPL-MCNC: 4.1 MMOL/L — SIGNIFICANT CHANGE UP (ref 3.5–5.3)
POTASSIUM SERPL-SCNC: 4.1 MMOL/L — SIGNIFICANT CHANGE UP (ref 3.5–5.3)
PROT SERPL-MCNC: 7 G/DL — SIGNIFICANT CHANGE UP (ref 6–8.3)
PROT UR-MCNC: NEGATIVE MG/DL — SIGNIFICANT CHANGE UP
RBC # BLD: 5.16 M/UL — SIGNIFICANT CHANGE UP (ref 4.2–5.8)
RBC # FLD: 15.7 % — HIGH (ref 10.3–14.5)
RH IG SCN BLD-IMP: POSITIVE — SIGNIFICANT CHANGE UP
SODIUM SERPL-SCNC: 140 MMOL/L — SIGNIFICANT CHANGE UP (ref 135–145)
SP GR SPEC: 1.02 — SIGNIFICANT CHANGE UP (ref 1–1.03)
UROBILINOGEN FLD QL: 1 MG/DL — SIGNIFICANT CHANGE UP (ref 0.2–1)
WBC # BLD: 5.1 K/UL — SIGNIFICANT CHANGE UP (ref 3.8–10.5)
WBC # FLD AUTO: 5.1 K/UL — SIGNIFICANT CHANGE UP (ref 3.8–10.5)

## 2024-09-09 RX ORDER — CHLORHEXIDINE GLUCONATE 40 MG/ML
1 SOLUTION TOPICAL ONCE
Refills: 0 | Status: COMPLETED | OUTPATIENT
Start: 2024-09-09 | End: 2024-09-09

## 2024-09-09 RX ORDER — MUPIROCIN 2 %
1 OINTMENT (GRAM) TOPICAL EVERY 12 HOURS
Refills: 0 | Status: DISCONTINUED | OUTPATIENT
Start: 2024-09-09 | End: 2024-09-10

## 2024-09-09 RX ORDER — CHLORHEXIDINE GLUCONATE 40 MG/ML
30 SOLUTION TOPICAL ONCE
Refills: 0 | Status: COMPLETED | OUTPATIENT
Start: 2024-09-09 | End: 2024-09-10

## 2024-09-09 RX ORDER — SODIUM CHLORIDE 9 MG/ML
3 INJECTION INTRAMUSCULAR; INTRAVENOUS; SUBCUTANEOUS EVERY 8 HOURS
Refills: 0 | Status: DISCONTINUED | OUTPATIENT
Start: 2024-09-09 | End: 2024-09-10

## 2024-09-09 RX ORDER — CEFUROXIME SODIUM 1.5 G
1500 VIAL (EA) INJECTION ONCE
Refills: 0 | Status: COMPLETED | OUTPATIENT
Start: 2024-09-09 | End: 2024-09-10

## 2024-09-09 RX ADMIN — SODIUM CHLORIDE 3 MILLILITER(S): 9 INJECTION INTRAMUSCULAR; INTRAVENOUS; SUBCUTANEOUS at 22:20

## 2024-09-09 RX ADMIN — CHLORHEXIDINE GLUCONATE 1 APPLICATION(S): 40 SOLUTION TOPICAL at 22:20

## 2024-09-09 NOTE — H&P ADULT - ASSESSMENT
48 year old male with PMH of known aortic root dilatation / aortic dilatation increased to 5.6 cm from 4.8 in 2019 and new onset atrial fibrillation hospitalization on 7/2024 was on amiodarone and Eliquis (stopped as per Dr. Serrano as per pt, only on metoprolol), newly  diagnosed hyperthyroidism 7/2024/ Graves Disease not on meds ( asymptomatic/ saw endo 7/30/2024 pending work ups after heart surgery as per pt), high prolactin level 7/2024 followed by Neurology ( pending open air MRI 2/2 claustrophobia and further work up after open heart surgery as per pt), planned for Valve sparing Aortic valve replacement , replacement of Ascending aorta, Transverse Hemiarch on 9/10/2024 w/Dr. Serrano. Patient's outpatient preop labs reveals low TSH, Patient asked to present to the hospital 9/9/24 for direct admission for evaluation prior to the OR 9/10.        Plan:  Admit to CTS Dr. Serrano  Preop w/u completed outpatient w/ SDA  Thyroid Panel pending   Preop orders placed  Bactroban ordered for + MSSA PCR  NPO at [facility]   OR in AM

## 2024-09-09 NOTE — H&P ADULT - HISTORY OF PRESENT ILLNESS
48 year old male with PMH of known aortic root dilatation / aortic dilatation increased to 5.6 cm from 4.8 in 2019 and new onset atrial fibrillation hospitalization on 7/2024 was on amiodarone and Eliquis ( stopped as per Dr. Serrano as per pt, only on metoprolol), newly  diagnosed hyperthyroidism 7/2024/ Graves Disease not on meds ( asymptomatic/ saw endo 7/30/2024 pending work ups after heart surgery as per pt), high prolactin level 7/2024 followed by Neurology ( pending open air MRI 2/2 claustrophobia and further work up after open heart surgery as per pt), planned for Valve sparing Aortic valve replacement , replacement of Ascending aorta, Transverse Hemiarch on 9/10/2024 w/Dr. Serrano. Patient's outpatient preop labs reveals low TSH, Patient asked to present to the hospital 9/9/10 for direct admission for evaluation prior to the OR 9/10. 48 year old male with PMH of known aortic root dilatation / aortic dilatation increased to 5.6 cm from 4.8 in 2019 and new onset atrial fibrillation hospitalization on 7/2024 was on amiodarone and Eliquis (stopped as per Dr. Serrano as per pt, only on metoprolol), newly  diagnosed hyperthyroidism 7/2024/ Graves Disease not on meds ( asymptomatic/ saw endo 7/30/2024 pending work ups after heart surgery as per pt), high prolactin level 7/2024 followed by Neurology ( pending open air MRI 2/2 claustrophobia and further work up after open heart surgery as per pt), planned for Valve sparing Aortic valve replacement , replacement of Ascending aorta, Transverse Hemiarch on 9/10/2024 w/Dr. Serrano. Patient's outpatient preop labs reveals low TSH, Patient asked to present to the hospital 9/9/24 for direct admission for evaluation prior to the OR 9/10.

## 2024-09-09 NOTE — H&P ADULT - NSICDXPASTMEDICALHX_GEN_ALL_CORE_FT
PAST MEDICAL HISTORY:  Dilated aortic root     Elevated prolactin level     Enlarged thyroid gland     Graves disease     H/O cardiomegaly     History of prediabetes     Hyperthyroidism     Marijuana use     Mild asthma     Paroxysmal atrial fibrillation     Thoracic aortic aneurysm     Thyroid nodule

## 2024-09-09 NOTE — H&P ADULT - NSHPLABSRESULTS_GEN_ALL_CORE
< from: TTE W or WO Ultrasound Enhancing Agent (07.14.24 @ 06:47) >    CONCLUSIONS:      1. Left ventricular cavity is normal in size. Left ventricular wall thickness is normal. Left ventricular systolic function is normal with an ejection fraction of 59 % by Nichole's method of disks. There are no regional wall motion abnormalities seen.   2. Aortic root at the sinuses of Valsalva is aneurysmal, measuring 5.10 cm (indexed 2.05 cm/m²). Ascending aorta diameter is aneurysmal, measuring 4.80 cm (indexed 1.93 cm/m²).   3. Trace aortic regurgitation.    < end of copied text >    < from: CT Angio Heart and Coronaries w/ IV Cont (07.16.24 @ 16:44) >    IMPRESSION: Enlargement of the aortic root at the sinuses of Valsalva   measuring up to about 5.1 cm as detailed above    CT coronary angiography shows: (Overall limited evaluation of the   coronary arteries due to motion artifact)  LMCA: Patent.  LAD: Patent although evaluation of the distal segment is limited.  LCx: Proximal segment appears patent. The distal segment, a very small   caliber vessel is difficult to evaluate. A prominent obtuse marginal   artery appears patent given the limitations of this study.  RCA: Dominant vessel, patent given the limitations of this study.    < end of copied text >

## 2024-09-09 NOTE — PATIENT PROFILE ADULT - PATIENT'S SEXUAL ORIENTATION
Bed in lowest position, wheels locked, appropriate side rails in place/Call bell, personal items and telephone in reach/Instruct patient to call for assistance before getting out of bed or chair/Non-slip footwear when patient is out of bed/[place] to call system/Physically safe environment - no spills, clutter or unnecessary equipment/Purposeful Proactive Rounding/Room/bathroom lighting operational, light cord in reach Heterosexual

## 2024-09-09 NOTE — PATIENT PROFILE ADULT - FUNCTIONAL ASSESSMENT - BASIC MOBILITY 5.
4 = No assist / stand by assistance Full range of motion of upper and lower extremities, no joint tenderness/swelling.

## 2024-09-09 NOTE — H&P ADULT - NSHPPHYSICALEXAM_GEN_ALL_CORE
PHYSICAL EXAM  General: NAD   HEENT:  NC/AT  Neurology: A&Ox3, NAD  CV : RRR+S1S2  Lungs: Respirations non-labored, B/L BS clear  Abdomen: Soft, NT/ND, +BSx4Q  : Voiding  Extremities: negative B/L LE edema, negative calf tenderness, +PP B/L   Musculoskeletal: B/L UE and LE 5/5 strength   Psychiatric: Normal mood, normal affect observed  Skin: Normal exam to inspection and palpation. no bleeding, no hematoma.

## 2024-09-09 NOTE — H&P ADULT - NS ATTEND AMEND GEN_ALL_CORE FT
48M h/o aortic root aneurysm [56mm] and ascending aortic aneurysm [42mm], transient episode of atrial fibrillation [has remained in SR since July]. TTE w/trace AI and preserved biventricular function.       - Plan for valve-sparing aortic root replacement [bioprosthetic valve-graft conduit as a backup] replacement of his ascending aorta and transverse hemiarch, Encompass ablation and left atrial appendage ligation.     - To CTICU following case    - Pending free thyroxine level      Adam Serrano MD  Cardiac Surgery

## 2024-09-09 NOTE — PATIENT PROFILE ADULT - SURGICAL SITE INCISION
Call Center TCM Note      Flowsheet Row Responses   [facility] patient discharged from? [facility]   Does the patient have one of the following disease processes/diagnoses(primary or secondary)? General Surgery   TCM attempt successful? No  [ on release]   Unsuccessful attempts Attempt 1   Call Status Left message            Stalin Casanova RN    12/11/2023, 12:14 EST         no

## 2024-09-10 ENCOUNTER — APPOINTMENT (OUTPATIENT)
Dept: CARDIOTHORACIC SURGERY | Facility: HOSPITAL | Age: 49
End: 2024-09-10

## 2024-09-10 ENCOUNTER — RESULT REVIEW (OUTPATIENT)
Age: 49
End: 2024-09-10

## 2024-09-10 LAB
ALBUMIN SERPL ELPH-MCNC: 3.3 G/DL — SIGNIFICANT CHANGE UP (ref 3.3–5)
ALBUMIN SERPL ELPH-MCNC: 3.5 G/DL — SIGNIFICANT CHANGE UP (ref 3.3–5)
ALP SERPL-CCNC: 70 U/L — SIGNIFICANT CHANGE UP (ref 40–120)
ALP SERPL-CCNC: 84 U/L — SIGNIFICANT CHANGE UP (ref 40–120)
ALT FLD-CCNC: 13 U/L — SIGNIFICANT CHANGE UP (ref 10–45)
ALT FLD-CCNC: 15 U/L — SIGNIFICANT CHANGE UP (ref 10–45)
ANION GAP SERPL CALC-SCNC: 11 MMOL/L — SIGNIFICANT CHANGE UP (ref 5–17)
ANION GAP SERPL CALC-SCNC: 17 MMOL/L — SIGNIFICANT CHANGE UP (ref 5–17)
APTT BLD: 28.7 SEC — SIGNIFICANT CHANGE UP (ref 24.5–35.6)
AST SERPL-CCNC: 11 U/L — SIGNIFICANT CHANGE UP (ref 10–40)
AST SERPL-CCNC: 59 U/L — HIGH (ref 10–40)
BASE EXCESS BLDV CALC-SCNC: -3.9 MMOL/L — LOW (ref -2–3)
BASOPHILS # BLD AUTO: 0 K/UL — SIGNIFICANT CHANGE UP (ref 0–0.2)
BASOPHILS NFR BLD AUTO: 0 % — SIGNIFICANT CHANGE UP (ref 0–2)
BILIRUB SERPL-MCNC: 0.6 MG/DL — SIGNIFICANT CHANGE UP (ref 0.2–1.2)
BILIRUB SERPL-MCNC: 2.6 MG/DL — HIGH (ref 0.2–1.2)
BUN SERPL-MCNC: 13 MG/DL — SIGNIFICANT CHANGE UP (ref 7–23)
BUN SERPL-MCNC: 14 MG/DL — SIGNIFICANT CHANGE UP (ref 7–23)
BURR CELLS BLD QL SMEAR: PRESENT — SIGNIFICANT CHANGE UP
CALCIUM SERPL-MCNC: 8.8 MG/DL — SIGNIFICANT CHANGE UP (ref 8.4–10.5)
CALCIUM SERPL-MCNC: 9.2 MG/DL — SIGNIFICANT CHANGE UP (ref 8.4–10.5)
CHLORIDE SERPL-SCNC: 104 MMOL/L — SIGNIFICANT CHANGE UP (ref 96–108)
CHLORIDE SERPL-SCNC: 105 MMOL/L — SIGNIFICANT CHANGE UP (ref 96–108)
CK MB BLD-MCNC: 6.7 % — HIGH (ref 0–3.5)
CK MB CFR SERPL CALC: 53 NG/ML — HIGH (ref 0–6.7)
CK SERPL-CCNC: 791 U/L — HIGH (ref 30–200)
CO2 BLDV-SCNC: 25 MMOL/L — SIGNIFICANT CHANGE UP (ref 22–26)
CO2 SERPL-SCNC: 20 MMOL/L — LOW (ref 22–31)
CO2 SERPL-SCNC: 24 MMOL/L — SIGNIFICANT CHANGE UP (ref 22–31)
CREAT SERPL-MCNC: 0.87 MG/DL — SIGNIFICANT CHANGE UP (ref 0.5–1.3)
CREAT SERPL-MCNC: 1.05 MG/DL — SIGNIFICANT CHANGE UP (ref 0.5–1.3)
EGFR: 108 ML/MIN/1.73M2 — SIGNIFICANT CHANGE UP
EGFR: 89 ML/MIN/1.73M2 — SIGNIFICANT CHANGE UP
EOSINOPHIL # BLD AUTO: 0 K/UL — SIGNIFICANT CHANGE UP (ref 0–0.5)
EOSINOPHIL NFR BLD AUTO: 0 % — SIGNIFICANT CHANGE UP (ref 0–6)
FIBRINOGEN PPP-MCNC: 166 MG/DL — LOW (ref 200–445)
GAS PNL BLDA: SIGNIFICANT CHANGE UP
GAS PNL BLDV: SIGNIFICANT CHANGE UP
GLUCOSE BLDC GLUCOMTR-MCNC: 158 MG/DL — HIGH (ref 70–99)
GLUCOSE BLDC GLUCOMTR-MCNC: 176 MG/DL — HIGH (ref 70–99)
GLUCOSE BLDC GLUCOMTR-MCNC: 196 MG/DL — HIGH (ref 70–99)
GLUCOSE BLDC GLUCOMTR-MCNC: 212 MG/DL — HIGH (ref 70–99)
GLUCOSE SERPL-MCNC: 155 MG/DL — HIGH (ref 70–99)
GLUCOSE SERPL-MCNC: 97 MG/DL — SIGNIFICANT CHANGE UP (ref 70–99)
HCO3 BLDV-SCNC: 24 MMOL/L — SIGNIFICANT CHANGE UP (ref 22–29)
HCT VFR BLD CALC: 39.9 % — SIGNIFICANT CHANGE UP (ref 39–50)
HEPARINASE TEG R TIME: 4.9 MIN — SIGNIFICANT CHANGE UP (ref 4.3–8.3)
HEPARINASE TEG R TIME: 7.4 MIN — SIGNIFICANT CHANGE UP (ref 4.3–8.3)
HGB BLD-MCNC: 13.3 G/DL — SIGNIFICANT CHANGE UP (ref 13–17)
HOROWITZ INDEX BLDV+IHG-RTO: 60 — SIGNIFICANT CHANGE UP
INR BLD: 1.28 RATIO — HIGH (ref 0.85–1.18)
LYMPHOCYTES # BLD AUTO: 0.15 K/UL — LOW (ref 1–3.3)
LYMPHOCYTES # BLD AUTO: 0.9 % — LOW (ref 13–44)
MANUAL SMEAR VERIFICATION: SIGNIFICANT CHANGE UP
MCHC RBC-ENTMCNC: 25.5 PG — LOW (ref 27–34)
MCHC RBC-ENTMCNC: 33.3 GM/DL — SIGNIFICANT CHANGE UP (ref 32–36)
MCV RBC AUTO: 76.6 FL — LOW (ref 80–100)
MONOCYTES # BLD AUTO: 0.98 K/UL — HIGH (ref 0–0.9)
MONOCYTES NFR BLD AUTO: 6 % — SIGNIFICANT CHANGE UP (ref 2–14)
NEUTROPHILS # BLD AUTO: 15.27 K/UL — HIGH (ref 1.8–7.4)
NEUTROPHILS NFR BLD AUTO: 90.5 % — HIGH (ref 43–77)
NEUTS BAND # BLD: 2.6 % — SIGNIFICANT CHANGE UP (ref 0–8)
OVALOCYTES BLD QL SMEAR: SLIGHT — SIGNIFICANT CHANGE UP
PCO2 BLDV: 53 MMHG — SIGNIFICANT CHANGE UP (ref 42–55)
PH BLDV: 7.26 — LOW (ref 7.32–7.43)
PLAT MORPH BLD: NORMAL — SIGNIFICANT CHANGE UP
PLATELET # BLD AUTO: 99 K/UL — LOW (ref 150–400)
PO2 BLDV: 51 MMHG — HIGH (ref 25–45)
POIKILOCYTOSIS BLD QL AUTO: SLIGHT — SIGNIFICANT CHANGE UP
POTASSIUM SERPL-MCNC: 4 MMOL/L — SIGNIFICANT CHANGE UP (ref 3.5–5.3)
POTASSIUM SERPL-MCNC: 4.2 MMOL/L — SIGNIFICANT CHANGE UP (ref 3.5–5.3)
POTASSIUM SERPL-SCNC: 4 MMOL/L — SIGNIFICANT CHANGE UP (ref 3.5–5.3)
POTASSIUM SERPL-SCNC: 4.2 MMOL/L — SIGNIFICANT CHANGE UP (ref 3.5–5.3)
PROT SERPL-MCNC: 5.5 G/DL — LOW (ref 6–8.3)
PROT SERPL-MCNC: 6.3 G/DL — SIGNIFICANT CHANGE UP (ref 6–8.3)
PROTHROM AB SERPL-ACNC: 13.3 SEC — HIGH (ref 9.5–13)
RAPIDTEG MAXIMUM AMPLITUDE: 43.9 MM — LOW (ref 52–70)
RAPIDTEG MAXIMUM AMPLITUDE: 49.5 MM — LOW (ref 52–70)
RBC # BLD: 5.21 M/UL — SIGNIFICANT CHANGE UP (ref 4.2–5.8)
RBC # FLD: 15.4 % — HIGH (ref 10.3–14.5)
RBC BLD AUTO: ABNORMAL
SAO2 % BLDV: 79.1 % — SIGNIFICANT CHANGE UP (ref 67–88)
SODIUM SERPL-SCNC: 139 MMOL/L — SIGNIFICANT CHANGE UP (ref 135–145)
SODIUM SERPL-SCNC: 142 MMOL/L — SIGNIFICANT CHANGE UP (ref 135–145)
T3 SERPL-MCNC: 139 NG/DL — SIGNIFICANT CHANGE UP (ref 80–200)
T4 AB SER-ACNC: 6.4 UG/DL — SIGNIFICANT CHANGE UP (ref 4.6–12)
T4 FREE SERPL-MCNC: 1 NG/DL — SIGNIFICANT CHANGE UP (ref 0.9–1.8)
TEG FUNCTIONAL FIBRINOGEN: 10.1 MM — LOW (ref 15–32)
TEG FUNCTIONAL FIBRINOGEN: 15.1 MM — SIGNIFICANT CHANGE UP (ref 15–32)
TEG MAXIMUM AMPLITUDE: 45.7 MM — LOW (ref 52–69)
TEG MAXIMUM AMPLITUDE: 50.2 MM — LOW (ref 52–69)
TEG REACTION TIME: 5.2 MIN — SIGNIFICANT CHANGE UP (ref 4.6–9.1)
TEG REACTION TIME: 7.4 MIN — SIGNIFICANT CHANGE UP (ref 4.6–9.1)
TROPONIN T, HIGH SENSITIVITY RESULT: 1049 NG/L — HIGH (ref 0–51)
TSH SERPL-MCNC: <0.01 UIU/ML — LOW (ref 0.27–4.2)
WBC # BLD: 16.4 K/UL — HIGH (ref 3.8–10.5)
WBC # FLD AUTO: 16.4 K/UL — HIGH (ref 3.8–10.5)

## 2024-09-10 PROCEDURE — 71045 X-RAY EXAM CHEST 1 VIEW: CPT | Mod: 26

## 2024-09-10 PROCEDURE — 94010 BREATHING CAPACITY TEST: CPT | Mod: 26

## 2024-09-10 PROCEDURE — 33864 ASCENDING AORTIC GRAFT: CPT | Mod: 22

## 2024-09-10 PROCEDURE — 99291 CRITICAL CARE FIRST HOUR: CPT | Mod: GC

## 2024-09-10 PROCEDURE — 93010 ELECTROCARDIOGRAM REPORT: CPT

## 2024-09-10 PROCEDURE — 88305 TISSUE EXAM BY PATHOLOGIST: CPT | Mod: 26

## 2024-09-10 PROCEDURE — 33864 ASCENDING AORTIC GRAFT: CPT | Mod: 80,22

## 2024-09-10 DEVICE — SURGICEL NU-KNIT 6 X 9": Type: IMPLANTABLE DEVICE | Status: FUNCTIONAL

## 2024-09-10 DEVICE — CANNULA PERFUSION  BALLOON 6MM: Type: IMPLANTABLE DEVICE | Status: FUNCTIONAL

## 2024-09-10 DEVICE — LIGATING CLIPS WECK HORIZON MEDIUM (BLUE) 24: Type: IMPLANTABLE DEVICE | Status: FUNCTIONAL

## 2024-09-10 DEVICE — SURGICEL FIBRILLAR 2 X 4": Type: IMPLANTABLE DEVICE | Status: FUNCTIONAL

## 2024-09-10 DEVICE — KIT CVC 2LUM MAC 9FR CHG: Type: IMPLANTABLE DEVICE | Status: FUNCTIONAL

## 2024-09-10 DEVICE — ATRICLIP LAA EXCLUSION DEVICE 45MM FLEX-V: Type: IMPLANTABLE DEVICE | Status: FUNCTIONAL

## 2024-09-10 DEVICE — KIT A-LINE 1LUM 20G X 12CM SAFE KIT: Type: IMPLANTABLE DEVICE | Status: FUNCTIONAL

## 2024-09-10 DEVICE — GRAFT VASC 32MMX15CM VALSALVA AORT ROOT: Type: IMPLANTABLE DEVICE | Status: FUNCTIONAL

## 2024-09-10 DEVICE — INTRODUCER PERCUTANEOUS INSERTION KIT: Type: IMPLANTABLE DEVICE | Status: FUNCTIONAL

## 2024-09-10 DEVICE — BIOGLUE 10ML SYR: Type: IMPLANTABLE DEVICE | Status: FUNCTIONAL

## 2024-09-10 DEVICE — CANNULA CORONARY OSTIAL 12FR X 6" BASKET TIP: Type: IMPLANTABLE DEVICE | Status: FUNCTIONAL

## 2024-09-10 DEVICE — CANNULA PERF CARDIOPLEGIA SURE TOUCH HND 15FR: Type: IMPLANTABLE DEVICE | Status: FUNCTIONAL

## 2024-09-10 DEVICE — TISSEEL 10ML: Type: IMPLANTABLE DEVICE | Status: FUNCTIONAL

## 2024-09-10 DEVICE — FELT PTFE 6 X 6": Type: IMPLANTABLE DEVICE | Status: FUNCTIONAL

## 2024-09-10 DEVICE — CANNULA VENOUS 3 STAGE STANDARD 29/37/37FR X 1/2" NON-VENTED: Type: IMPLANTABLE DEVICE | Status: FUNCTIONAL

## 2024-09-10 DEVICE — IMPLANTABLE DEVICE: Type: IMPLANTABLE DEVICE | Status: FUNCTIONAL

## 2024-09-10 DEVICE — CATH VENT LEFT HEART SILICONE 20FR NON-VENTED: Type: IMPLANTABLE DEVICE | Status: FUNCTIONAL

## 2024-09-10 DEVICE — CANNULA STR SELF INFLATING 3.5MM: Type: IMPLANTABLE DEVICE | Status: FUNCTIONAL

## 2024-09-10 DEVICE — LIGATING CLIPS WECK HORIZON SMALL-WIDE (RED) 24: Type: IMPLANTABLE DEVICE | Status: FUNCTIONAL

## 2024-09-10 DEVICE — PACING WIRE WHITE M-25 WINGED WIRE 26MM X 89MM: Type: IMPLANTABLE DEVICE | Status: FUNCTIONAL

## 2024-09-10 DEVICE — CANNULA ANTEGRADE W/VENT 12GA: Type: IMPLANTABLE DEVICE | Status: FUNCTIONAL

## 2024-09-10 DEVICE — SURGIFLO MATRIX WITH THROMBIN KIT: Type: IMPLANTABLE DEVICE | Status: FUNCTIONAL

## 2024-09-10 DEVICE — SURGIFOAM PAD 8CM X 12.5CM X 10MM (100): Type: IMPLANTABLE DEVICE | Status: FUNCTIONAL

## 2024-09-10 DEVICE — LIGATING CLIPS WECK HORIZON LARGE (ORANGE) 6: Type: IMPLANTABLE DEVICE | Status: FUNCTIONAL

## 2024-09-10 DEVICE — CANNULA ARTERIAL OPTISITE 22FR X 3/8" VENTED: Type: IMPLANTABLE DEVICE | Status: FUNCTIONAL

## 2024-09-10 DEVICE — CANNULA AORTIC ROOT 14G X 14CM FLANGED: Type: IMPLANTABLE DEVICE | Status: FUNCTIONAL

## 2024-09-10 RX ORDER — NICARDIPINE HCL 20 MG
1 CAPSULE ORAL
Qty: 40 | Refills: 0 | Status: DISCONTINUED | OUTPATIENT
Start: 2024-09-10 | End: 2024-09-12

## 2024-09-10 RX ORDER — CHLORHEXIDINE GLUCONATE 40 MG/ML
15 SOLUTION TOPICAL EVERY 12 HOURS
Refills: 0 | Status: DISCONTINUED | OUTPATIENT
Start: 2024-09-10 | End: 2024-09-10

## 2024-09-10 RX ORDER — ACETAMINOPHEN 325 MG/1
650 TABLET ORAL EVERY 6 HOURS
Refills: 0 | Status: DISCONTINUED | OUTPATIENT
Start: 2024-09-13 | End: 2024-09-15

## 2024-09-10 RX ORDER — ACETAMINOPHEN 325 MG/1
650 TABLET ORAL EVERY 6 HOURS
Refills: 0 | Status: COMPLETED | OUTPATIENT
Start: 2024-09-10 | End: 2024-09-13

## 2024-09-10 RX ORDER — CHLORHEXIDINE GLUCONATE 40 MG/ML
15 SOLUTION TOPICAL EVERY 12 HOURS
Refills: 0 | Status: DISCONTINUED | OUTPATIENT
Start: 2024-09-10 | End: 2024-09-15

## 2024-09-10 RX ORDER — DOBUTAMINE 12.5 MG/ML
4 INJECTION, SOLUTION INTRAVENOUS
Qty: 500 | Refills: 0 | Status: DISCONTINUED | OUTPATIENT
Start: 2024-09-10 | End: 2024-09-12

## 2024-09-10 RX ORDER — ASCORBIC ACID/ASCORBATE SODIUM 500 MG
500 TABLET,CHEWABLE ORAL
Refills: 0 | Status: COMPLETED | OUTPATIENT
Start: 2024-09-10 | End: 2024-09-15

## 2024-09-10 RX ORDER — POTASSIUM CHLORIDE 10 MEQ
10 TABLET, EXT RELEASE, PARTICLES/CRYSTALS ORAL
Refills: 0 | Status: DISCONTINUED | OUTPATIENT
Start: 2024-09-10 | End: 2024-09-14

## 2024-09-10 RX ORDER — GABAPENTIN 100 MG
100 CAPSULE ORAL EVERY 8 HOURS
Refills: 0 | Status: COMPLETED | OUTPATIENT
Start: 2024-09-10 | End: 2024-09-15

## 2024-09-10 RX ORDER — ACETAMINOPHEN 325 MG/1
1000 TABLET ORAL ONCE
Refills: 0 | Status: COMPLETED | OUTPATIENT
Start: 2024-09-10 | End: 2024-09-10

## 2024-09-10 RX ORDER — HYDROMORPHONE HYDROCHLORIDE 2 MG/1
1 TABLET ORAL ONCE
Refills: 0 | Status: DISCONTINUED | OUTPATIENT
Start: 2024-09-10 | End: 2024-09-10

## 2024-09-10 RX ORDER — OXYCODONE HYDROCHLORIDE 5 MG/1
5 TABLET ORAL EVERY 4 HOURS
Refills: 0 | Status: DISCONTINUED | OUTPATIENT
Start: 2024-09-10 | End: 2024-09-15

## 2024-09-10 RX ORDER — OXYCODONE HYDROCHLORIDE 15 MG/1
1 TABLET ORAL
Qty: 450 | Refills: 0 | Status: DISCONTINUED | OUTPATIENT
Start: 2024-09-10 | End: 2024-09-10

## 2024-09-10 RX ORDER — GABAPENTIN 100 MG
300 CAPSULE ORAL ONCE
Refills: 0 | Status: COMPLETED | OUTPATIENT
Start: 2024-09-10 | End: 2024-09-10

## 2024-09-10 RX ORDER — CHLORHEXIDINE GLUCONATE 40 MG/ML
1 SOLUTION TOPICAL ONCE
Refills: 0 | Status: COMPLETED | OUTPATIENT
Start: 2024-09-10 | End: 2024-09-10

## 2024-09-10 RX ORDER — ASPIRIN 81 MG
81 TABLET, DELAYED RELEASE (ENTERIC COATED) ORAL DAILY
Refills: 0 | Status: DISCONTINUED | OUTPATIENT
Start: 2024-09-10 | End: 2024-09-15

## 2024-09-10 RX ORDER — CHLORHEXIDINE GLUCONATE 40 MG/ML
1 SOLUTION TOPICAL DAILY
Refills: 0 | Status: DISCONTINUED | OUTPATIENT
Start: 2024-09-10 | End: 2024-09-15

## 2024-09-10 RX ORDER — OXYCODONE HYDROCHLORIDE 15 MG/1
0.5 TABLET ORAL
Qty: 450 | Refills: 0 | Status: DISCONTINUED | OUTPATIENT
Start: 2024-09-10 | End: 2024-09-11

## 2024-09-10 RX ORDER — POLYETHYLENE GLYCOL 3350 17 G/17G
17 POWDER, FOR SOLUTION ORAL DAILY
Refills: 0 | Status: DISCONTINUED | OUTPATIENT
Start: 2024-09-11 | End: 2024-09-15

## 2024-09-10 RX ORDER — SODIUM CHLORIDE 9 MG/ML
1000 INJECTION INTRAMUSCULAR; INTRAVENOUS; SUBCUTANEOUS
Refills: 0 | Status: DISCONTINUED | OUTPATIENT
Start: 2024-09-10 | End: 2024-09-15

## 2024-09-10 RX ORDER — ASPIRIN 81 MG
300 TABLET, DELAYED RELEASE (ENTERIC COATED) ORAL ONCE
Refills: 0 | Status: DISCONTINUED | OUTPATIENT
Start: 2024-09-10 | End: 2024-09-11

## 2024-09-10 RX ORDER — CEFUROXIME SODIUM 1.5 G
1500 VIAL (EA) INJECTION EVERY 8 HOURS
Refills: 0 | Status: COMPLETED | OUTPATIENT
Start: 2024-09-10 | End: 2024-09-12

## 2024-09-10 RX ORDER — FAMOTIDINE 10 MG/ML
20 INJECTION INTRAVENOUS EVERY 12 HOURS
Refills: 0 | Status: DISCONTINUED | OUTPATIENT
Start: 2024-09-10 | End: 2024-09-11

## 2024-09-10 RX ORDER — MUPIROCIN 2 %
1 OINTMENT (GRAM) TOPICAL EVERY 12 HOURS
Refills: 0 | Status: COMPLETED | OUTPATIENT
Start: 2024-09-10 | End: 2024-09-15

## 2024-09-10 RX ORDER — SENNA 187 MG
2 TABLET ORAL AT BEDTIME
Refills: 0 | Status: DISCONTINUED | OUTPATIENT
Start: 2024-09-11 | End: 2024-09-15

## 2024-09-10 RX ORDER — DEXMEDETOMIDINE HYDROCHLORIDE IN 0.9% SODIUM CHLORIDE 4 UG/ML
1 INJECTION INTRAVENOUS
Qty: 200 | Refills: 0 | Status: DISCONTINUED | OUTPATIENT
Start: 2024-09-10 | End: 2024-09-12

## 2024-09-10 RX ORDER — CHLORHEXIDINE GLUCONATE 40 MG/ML
1 SOLUTION TOPICAL DAILY
Refills: 0 | Status: COMPLETED | OUTPATIENT
Start: 2024-09-10 | End: 2024-09-15

## 2024-09-10 RX ORDER — METOPROLOL TARTRATE 100 MG/1
50 TABLET ORAL DAILY
Refills: 0 | Status: DISCONTINUED | OUTPATIENT
Start: 2024-09-10 | End: 2024-09-10

## 2024-09-10 RX ORDER — MEPERIDINE HYDROCHLORIDE 50 MG/1
25 TABLET ORAL ONCE
Refills: 0 | Status: DISCONTINUED | OUTPATIENT
Start: 2024-09-10 | End: 2024-09-14

## 2024-09-10 RX ORDER — DEXTROSE 15 G/33 G
25 GEL IN PACKET (GRAM) ORAL
Refills: 0 | Status: DISCONTINUED | OUTPATIENT
Start: 2024-09-10 | End: 2024-09-15

## 2024-09-10 RX ORDER — HYDROMORPHONE HYDROCHLORIDE 2 MG/1
0.5 TABLET ORAL EVERY 6 HOURS
Refills: 0 | Status: DISCONTINUED | OUTPATIENT
Start: 2024-09-10 | End: 2024-09-12

## 2024-09-10 RX ORDER — INSULIN REGULAR, HUMAN 100/ML (3)
3 INSULIN PEN (ML) SUBCUTANEOUS
Qty: 100 | Refills: 0 | Status: DISCONTINUED | OUTPATIENT
Start: 2024-09-10 | End: 2024-09-11

## 2024-09-10 RX ORDER — DEXTROSE 15 G/33 G
50 GEL IN PACKET (GRAM) ORAL
Refills: 0 | Status: DISCONTINUED | OUTPATIENT
Start: 2024-09-10 | End: 2024-09-15

## 2024-09-10 RX ORDER — OXYCODONE HYDROCHLORIDE 15 MG/1
400 TABLET ORAL
Refills: 0 | Status: DISCONTINUED | OUTPATIENT
Start: 2024-09-10 | End: 2024-09-11

## 2024-09-10 RX ORDER — OXYCODONE HYDROCHLORIDE 5 MG/1
10 TABLET ORAL EVERY 4 HOURS
Refills: 0 | Status: DISCONTINUED | OUTPATIENT
Start: 2024-09-10 | End: 2024-09-15

## 2024-09-10 RX ADMIN — HYDROMORPHONE HYDROCHLORIDE 1 MILLIGRAM(S): 2 TABLET ORAL at 17:50

## 2024-09-10 RX ADMIN — DOBUTAMINE 14.1 MICROGRAM(S)/KG/MIN: 12.5 INJECTION, SOLUTION INTRAVENOUS at 18:29

## 2024-09-10 RX ADMIN — CHLORHEXIDINE GLUCONATE 30 MILLILITER(S): 40 SOLUTION TOPICAL at 05:22

## 2024-09-10 RX ADMIN — SODIUM CHLORIDE 3 MILLILITER(S): 9 INJECTION INTRAMUSCULAR; INTRAVENOUS; SUBCUTANEOUS at 05:53

## 2024-09-10 RX ADMIN — SODIUM CHLORIDE 3 MILLILITER(S): 9 INJECTION INTRAMUSCULAR; INTRAVENOUS; SUBCUTANEOUS at 21:22

## 2024-09-10 RX ADMIN — ACETAMINOPHEN 1000 MILLIGRAM(S): 325 TABLET ORAL at 05:21

## 2024-09-10 RX ADMIN — Medication 1 APPLICATION(S): at 18:27

## 2024-09-10 RX ADMIN — METOPROLOL TARTRATE 50 MILLIGRAM(S): 100 TABLET ORAL at 05:22

## 2024-09-10 RX ADMIN — Medication 300 MILLIGRAM(S): at 05:21

## 2024-09-10 RX ADMIN — HYDROMORPHONE HYDROCHLORIDE 0.5 MILLIGRAM(S): 2 TABLET ORAL at 21:15

## 2024-09-10 RX ADMIN — Medication 1000 MILLILITER(S): at 18:16

## 2024-09-10 RX ADMIN — Medication 100 MILLIGRAM(S): at 21:32

## 2024-09-10 RX ADMIN — HYDROMORPHONE HYDROCHLORIDE 0.5 MILLIGRAM(S): 2 TABLET ORAL at 21:30

## 2024-09-10 RX ADMIN — DEXMEDETOMIDINE HYDROCHLORIDE IN 0.9% SODIUM CHLORIDE 29.3 MICROGRAM(S)/KG/HR: 4 INJECTION INTRAVENOUS at 18:28

## 2024-09-10 RX ADMIN — HYDROMORPHONE HYDROCHLORIDE 1 MILLIGRAM(S): 2 TABLET ORAL at 17:55

## 2024-09-10 RX ADMIN — Medication 1 APPLICATION(S): at 05:22

## 2024-09-10 RX ADMIN — FAMOTIDINE 20 MILLIGRAM(S): 10 INJECTION INTRAVENOUS at 18:27

## 2024-09-10 NOTE — PRE-ANESTHESIA EVALUATION ADULT - NSANTHPMHFT_GEN_ALL_CORE
48yoM PMH aortic aneurysm, recent diagnosis of hyperthyroidism and AF. Denies any chest pain, SOB, SALGUERO. No N/V, GERD, esophageal issues. 48yoM PMH aortic aneurysm, marijuana use, recent diagnosis of hyperthyroidism and AF. Denies any chest pain, SOB, SALGUERO. No N/V, GERD, esophageal issues.    Free thyroxine is 1.0 this morning, Endocrine has cleared him for surgery today.

## 2024-09-10 NOTE — BRIEF OPERATIVE NOTE - NSICDXBRIEFPROCEDURE_GEN_ALL_CORE_FT
PROCEDURES:  Aortic root replacement 11-Sep-2024 16:36:03 valve sparing Florentin Richard  Isolation of pulmonary vein 11-Sep-2024 16:36:27  Florentin Richard  Clipping, left atrial appendage 11-Sep-2024 16:36:38  Florentin Richard

## 2024-09-10 NOTE — PRE PROCEDURE NOTE - NS ATTEND AMEND GEN_ALL_CORE FT
48M h/o aortic root aneurysm [56mm] and ascending aortic aneurysm [42mm], transient episode of atrial fibrillation [has remained in SR since July]. TTE w/trace AI and preserved biventricular function.       - Plan for valve-sparing aortic root replacement [bioprosthetic valve-graft conduit as a backup] replacement of his ascending aorta and transverse hemiarch, Encompass ablation and left atrial appendage ligation.

## 2024-09-10 NOTE — AIRWAY REMOVAL NOTE  ADULT & PEDS - ARTIFICAL AIRWAY REMOVAL COMMENTS
Written order for extubation verified. The patient was identified by full name and birth date compared to the identification band. Present during the procedure was Gina MARTINEZ.

## 2024-09-10 NOTE — PRE-ANESTHESIA EVALUATION ADULT - NSRADCARDRESULTSFT_GEN_ALL_CORE
< from: TTE W or WO Ultrasound Enhancing Agent (07.14.24 @ 06:47) >    CONCLUSIONS:      1. Left ventricular cavity is normal in size. Left ventricular wall thickness is normal. Left ventricular systolic function is normal with an ejection fraction of 59 % by Nichole's method of disks. There are no regional wall motion abnormalities seen.   2. Aortic root at the sinuses of Valsalva is aneurysmal, measuring 5.10 cm (indexed 2.05 cm/m²). Ascending aorta diameter is aneurysmal, measuring 4.80 cm (indexed 1.93 cm/m²).   3. Trace aortic regurgitation.    ________________________________________________________________________________________  FINDINGS:     Left Ventricle:  The left ventricular cavity is normal in size. Left ventricular wall thickness is normal. Left ventricular systolic function is normal with a calculated ejection fraction of 59 % by the Nichole's biplane method of disks. There are no regional wall motion abnormalities seen. There is normal left ventricular diastolic function.     Right Ventricle:  The right ventricular cavity is normal in size and right ventricular systolic function is normal. Tricuspid annular plane systolic excursion (TAPSE) is 2.4 cm (normal >=1.7 cm).     Left Atrium:  The left atrium is normal in size withan indexed volume of 40.29 ml/m².     Right Atrium:  The right atrium is dilated with an indexed volume of 48.75 ml/m².     Interatrial Septum:  The interatrial septum appears intact.     Aortic Valve:  Normal appearing aortic valve. There is trace aortic regurgitation.     Mitral Valve:  Normal mitral valve.     Tricuspid Valve:  There is trace tricuspid regurgitation. Estimated pulmonary artery systolic pressure is 24 mmHg, consistent with normal pulmonary artery pressure.     Pulmonic Valve:  Normal pulmonic valve.     Aorta:  The aortic root at the sinuses of Valsalva is aneurysmal, measuring 5.10 cm (indexed 2.05 cm/m²). The ascending aorta diameter is aneurysmal, measuring 4.80 cm (indexed 1.93 cm/m²). The aortic arch diameter is normalin size, measuring 3.0 cm (indexed 1.21 cm/m²).     Pericardium:  No pericardial effusion seen.     Systemic Veins:  The inferior vena cava is dilated (dilated >2.1cm) with normal inspiratory collapse (normal >50%) consistent with mildly elevated right atrial pressure (~8, range 5-10mmHg).    < end of copied text >    < from: VA Duplex Carotid, Bilat (09.06.24 @ 15:08) >    IMPRESSION: No significant hemodynamic stenosis of either carotid artery.    < end of copied text >

## 2024-09-10 NOTE — PROGRESS NOTE ADULT - SUBJECTIVE AND OBJECTIVE BOX
POD # 0 - s/p Transverse Hemiarch on 9/10/24    Brief history :     24 hour events :     ICU Vital Signs Last 24 Hrs  T(C): 37.1 (09-10-24 @ 18:00), Max: 37.1 (09-10-24 @ 18:00)  T(F): 98.7 (09-10-24 @ 18:00), Max: 98.7 (09-10-24 @ 18:00)  HR: 79 (09-10-24 @ 19:00) (73 - 88)  BP: 121/72 (09-10-24 @ 08:23) (121/72 - 122/87)  BP(mean): --  ABP: 139/85 (09-10-24 @ 19:00) (109/65 - 139/85)  ABP(mean): 105 (09-10-24 @ 19:00) (81 - 105)  RR: 14 (09-10-24 @ 19:00) (14 - 18)  SpO2: 100% (09-10-24 @ 19:00) (98% - 100%)    I&O's Summary    10 Sep 2024 07:01  -  10 Sep 2024 19:39  --------------------------------------------------------  IN: 603.8 mL / OUT: 275 mL / NET: 328.8 mL      Mode: AC/ CMV (Assist Control/ Continuous Mandatory Ventilation)  RR (machine): 14  TV (machine): 650  FiO2: 100  PEEP: 5  ITime: 1  MAP: 9  PIP: 26        ABG - ( 10 Sep 2024 17:37 )  pH: 7.34  /  pCO2: 43    /  pO2: 433   / HCO3: 23    / Base Excess: -2.6  /  SaO2: 100.0                                   13.3   16.40 )-----------( x        ( 10 Sep 2024 17:47 )             39.9     10 Sep 2024 17:47    142    |  105    |  14     ----------------------------<  155    4.2     |  20     |  1.05     Ca    9.2        10 Sep 2024 17:47    TPro  5.5    /  Alb  3.3    /  TBili  2.6    /  DBili  x      /  AST  59     /  ALT  15     /  AlkPhos  70     10 Sep 2024 17:47    PT/INR - ( 10 Sep 2024 17:41 )   PT: 13.3 sec;   INR: 1.28 ratio         PTT - ( 10 Sep 2024 17:41 )  PTT:28.7 sec      MEDICATIONS  (STANDING):  acetaminophen     Tablet .. 650 milliGRAM(s) Oral every 6 hours  aMIOdarone    Tablet 400 milliGRAM(s) Oral two times a day  aMIOdarone Infusion 1 mG/Min IV Continuous <Continuous>  aMIOdarone Infusion 1 mG/Min IV Continuous <Continuous>  ascorbic acid 500 milliGRAM(s) Oral two times a day  aspirin enteric coated 81 milliGRAM(s) Oral daily  aspirin Suppository 300 milliGRAM(s) Rectal once  cefuroxime  IVPB 1500 milliGRAM(s) IV Intermittent every 8 hours  chlorhexidine 0.12% Liquid 15 milliLiter(s) Oral Mucosa every 12 hours  chlorhexidine 0.12% Liquid 15 milliLiter(s) Oral Mucosa every 12 hours  chlorhexidine 2% Cloths 1 Application(s) Topical daily  chlorhexidine 4% Liquid 1 Application(s) Topical daily  dexMEDEtomidine Infusion 1 MICROgram(s)/kG/Hr IV Continuous <Continuous>  dextrose 50% Injectable 50 milliLiter(s) IV Push every 15 minutes  dextrose 50% Injectable 25 milliLiter(s) IV Push every 15 minutes  DOBUTamine Infusion 4 MICROgram(s)/kG/Min IV Continuous <Continuous>  famotidine Injectable 20 milliGRAM(s) IV Push every 12 hours  gabapentin 100 milliGRAM(s) Oral every 8 hours  insulin regular Infusion 3 Unit(s)/Hr IV Continuous <Continuous>  meperidine     Injectable 25 milliGRAM(s) IV Push once  mupirocin 2% Nasal 1 Application(s) Both Nostrils every 12 hours  niCARdipine Infusion 1 mG/Hr IV Continuous <Continuous>  potassium chloride  10 mEq/50 mL IVPB 10 milliEquivalent(s) IV Intermittent every 1 hour  potassium chloride  10 mEq/50 mL IVPB 10 milliEquivalent(s) IV Intermittent every 1 hour  potassium chloride  10 mEq/50 mL IVPB 10 milliEquivalent(s) IV Intermittent every 1 hour  sodium chloride 0.9% lock flush 3 milliLiter(s) IV Push every 8 hours  sodium chloride 0.9%. 1000 milliLiter(s) IV Continuous <Continuous>    Home Medications:  metoprolol succinate 50 mg oral tablet, extended release: 1 tab(s) orally once a day (at bedtime) (06 Sep 2024 14:29)    PHYSICAL EXAM:  Gen : no acute distress  Neck: No LAD, No JVD  Respiratory: decreased in the bases  Cardiovascular: S1 and S2, RRR, no M/G/R  Gastrointestinal: BS+, soft, NT/ND  Extremities: No peripheral edema  Vascular: 2+ peripheral pulses  Neurological: A/O x 3, no focal deficits  Incision: clean dry/ no sign of infection  Lines: no sign of infection      S/p Transverse Hemiarch on 9/10/24  Acute post operative respiratory failure  Acute blood loss anemia   Post operative pain   Lactic acidosis  Encounter for ventilator weaning      Neuro  Neuro assessment  Sedation with Precedex  Multimodal pain management    CVS   S/p Transverse Hemiarch on 9/10/24  EF 50-55%  Inotrope - Dobutamine 4 mcg/kg/min  MCS - Amiodarone 400 PO, Cardene gtt 1 mg/Hr   Sinus Rhythm  Chest tube management  s/p Transverse Hemiarch today      Pulm   Ventilator weaning as tolerated   Fast track extubation     GI   GI proph/Bowel regimen       Monitor UOP  Correct Electrolytes K >4.0-4.5 Mag 2.0-2.5    Endo   A1c 6.2%  Glycemic control per protocol Glucose <180  Thyroid     Heme   Correct coagulapathy, monitor for bleeding  ASA to start tomorrow  P2Y12  Acute blood loss anemia expected post operative loss - IV Fe, Epo    ID   Periop antibiotics  MRSA screen         Critical care time spent    min       Care plan discussed with the ICU care team.   Patient remains critical, at risk for life threatening decompensation.    I have spent 30 minutes providing critical care management to this patient.    By signing my name below, I, Avril Batista, attest that this documentation has been prepared under the direction and in the presence of Noemi Sinclair MD.   Electronically signed: Avril Batista, 09-10-24 @ 19:39    I, Noemi Sinclair, personally performed the services described in this documentation. all medical record entries made by the scribe were at my direction and in my presence. I have reviewed the chart and agree that the record reflects my personal performance and is accurate and complete  Electronically signed: Noemi Sinclair MD.  POD # 0 - s/p Transverse Hemiarch on 9/10/24    Brief history :     24 hour events :     ICU Vital Signs Last 24 Hrs  T(C): 37.1 (09-10-24 @ 18:00), Max: 37.1 (09-10-24 @ 18:00)  T(F): 98.7 (09-10-24 @ 18:00), Max: 98.7 (09-10-24 @ 18:00)  HR: 79 (09-10-24 @ 19:00) (73 - 88)  BP: 121/72 (09-10-24 @ 08:23) (121/72 - 122/87)  BP(mean): --  ABP: 139/85 (09-10-24 @ 19:00) (109/65 - 139/85)  ABP(mean): 105 (09-10-24 @ 19:00) (81 - 105)  RR: 14 (09-10-24 @ 19:00) (14 - 18)  SpO2: 100% (09-10-24 @ 19:00) (98% - 100%)    I&O's Summary    10 Sep 2024 07:01  -  10 Sep 2024 19:39  --------------------------------------------------------  IN: 603.8 mL / OUT: 275 mL / NET: 328.8 mL      Mode: AC/ CMV (Assist Control/ Continuous Mandatory Ventilation)  RR (machine): 14  TV (machine): 650  FiO2: 100  PEEP: 5  ITime: 1  MAP: 9  PIP: 26        ABG - ( 10 Sep 2024 17:37 )  pH: 7.34  /  pCO2: 43    /  pO2: 433   / HCO3: 23    / Base Excess: -2.6  /  SaO2: 100.0                                   13.3   16.40 )-----------( x        ( 10 Sep 2024 17:47 )             39.9     10 Sep 2024 17:47    142    |  105    |  14     ----------------------------<  155    4.2     |  20     |  1.05     Ca    9.2        10 Sep 2024 17:47    TPro  5.5    /  Alb  3.3    /  TBili  2.6    /  DBili  x      /  AST  59     /  ALT  15     /  AlkPhos  70     10 Sep 2024 17:47    PT/INR - ( 10 Sep 2024 17:41 )   PT: 13.3 sec;   INR: 1.28 ratio         PTT - ( 10 Sep 2024 17:41 )  PTT:28.7 sec      MEDICATIONS  (STANDING):  acetaminophen     Tablet .. 650 milliGRAM(s) Oral every 6 hours  aMIOdarone    Tablet 400 milliGRAM(s) Oral two times a day  aMIOdarone Infusion 1 mG/Min IV Continuous <Continuous>  aMIOdarone Infusion 1 mG/Min IV Continuous <Continuous>  ascorbic acid 500 milliGRAM(s) Oral two times a day  aspirin enteric coated 81 milliGRAM(s) Oral daily  aspirin Suppository 300 milliGRAM(s) Rectal once  cefuroxime  IVPB 1500 milliGRAM(s) IV Intermittent every 8 hours  chlorhexidine 0.12% Liquid 15 milliLiter(s) Oral Mucosa every 12 hours  chlorhexidine 0.12% Liquid 15 milliLiter(s) Oral Mucosa every 12 hours  chlorhexidine 2% Cloths 1 Application(s) Topical daily  chlorhexidine 4% Liquid 1 Application(s) Topical daily  dexMEDEtomidine Infusion 1 MICROgram(s)/kG/Hr IV Continuous <Continuous>  dextrose 50% Injectable 50 milliLiter(s) IV Push every 15 minutes  dextrose 50% Injectable 25 milliLiter(s) IV Push every 15 minutes  DOBUTamine Infusion 4 MICROgram(s)/kG/Min IV Continuous <Continuous>  famotidine Injectable 20 milliGRAM(s) IV Push every 12 hours  gabapentin 100 milliGRAM(s) Oral every 8 hours  insulin regular Infusion 3 Unit(s)/Hr IV Continuous <Continuous>  meperidine     Injectable 25 milliGRAM(s) IV Push once  mupirocin 2% Nasal 1 Application(s) Both Nostrils every 12 hours  niCARdipine Infusion 1 mG/Hr IV Continuous <Continuous>  potassium chloride  10 mEq/50 mL IVPB 10 milliEquivalent(s) IV Intermittent every 1 hour  potassium chloride  10 mEq/50 mL IVPB 10 milliEquivalent(s) IV Intermittent every 1 hour  potassium chloride  10 mEq/50 mL IVPB 10 milliEquivalent(s) IV Intermittent every 1 hour  sodium chloride 0.9% lock flush 3 milliLiter(s) IV Push every 8 hours  sodium chloride 0.9%. 1000 milliLiter(s) IV Continuous <Continuous>    Home Medications:  metoprolol succinate 50 mg oral tablet, extended release: 1 tab(s) orally once a day (at bedtime) (06 Sep 2024 14:29)    PHYSICAL EXAM:  Gen : no acute distress  Neck: No LAD, No JVD  Respiratory: decreased in the bases  Cardiovascular: S1 and S2, RRR, no M/G/R  Gastrointestinal: BS+, soft, NT/ND  Extremities: No peripheral edema  Vascular: 2+ peripheral pulses  Neurological: A/O x 3, no focal deficits  Incision: clean dry/ no sign of infection  Lines: no sign of infection      S/p Transverse Hemiarch on 9/10/24  Acute post operative respiratory failure  Acute blood loss anemia   Post operative pain   Lactic acidosis  Encounter for ventilator weaning      Neuro  Neuro assessment  Sedation with Precedex  Multimodal pain management    CVS   S/p Transverse Hemiarch on 9/10/24  EF 50-55%  Inotrope - Dobutamine 4 mcg/kg/min  MCS - Amiodarone 400 PO, Cardene gtt 1 mg/Hr   Sinus Rhythm  Chest tube management  s/p Transverse Hemiarch today      Pulm   Ventilator weaning as tolerated   Fast track extubation     GI   NPO for now  GI proph/Bowel regimen       Monitor UOP  Correct Electrolytes K >4.0-4.5 Mag 2.0-2.5  Garcia catheter for strict I/O measurements.    Endo   A1c 6.2%  Glycemic control per protocol Glucose <180  Thyroid     Heme   Correct coagulapathy, monitor for bleeding  ASA to start tomorrow  P2Y12  Acute blood loss anemia expected post operative loss - IV Fe, Epo    ID   Periop antibiotics  MRSA screen         Critical care time spent    min       Care plan discussed with the ICU care team.   Patient remains critical, at risk for life threatening decompensation.    I have spent 30 minutes providing critical care management to this patient.    By signing my name below, I, Avril Batista, attest that this documentation has been prepared under the direction and in the presence of Neomi Sinclair MD.   Electronically signed: Avril Batista, 09-10-24 @ 19:39    I, Noemi Sinclair, personally performed the services described in this documentation. all medical record entries made by the scribe were at my direction and in my presence. I have reviewed the chart and agree that the record reflects my personal performance and is accurate and complete  Electronically signed: Noemi Sinclair MD.  POD # 0 - s/p Transverse Hemiarch on 9/10/24    Brief history : 48 year old male with PMH of known aortic root dilatation / aortic dilatation increased to 5.6 cm from 4.8 in 2019 and new onset atrial fibrillation hospitalization on 7/2024 was on amiodarone and Eliquis (stopped as per Dr. Serrano as per pt, only on metoprolol), newly  diagnosed hyperthyroidism 7/2024/ Graves Disease not on meds ( asymptomatic/ saw endo 7/30/2024 pending work ups after heart surgery as per pt), planned for Valve sparing Aortic valve replacement , replacement of Ascending aorta, Transverse Hemiarch on 9/10/2024 w/Dr. Serrano. Patient's outpatient preop labs reveals low TSH, Patient asked to present to the hospital 9/9/24 for direct admission for evaluation prior to the OR 9/10.    24 hour events :     ICU Vital Signs Last 24 Hrs  T(C): 37.1 (09-10-24 @ 18:00), Max: 37.1 (09-10-24 @ 18:00)  T(F): 98.7 (09-10-24 @ 18:00), Max: 98.7 (09-10-24 @ 18:00)  HR: 79 (09-10-24 @ 19:00) (73 - 88)  BP: 121/72 (09-10-24 @ 08:23) (121/72 - 122/87)  BP(mean): --  ABP: 139/85 (09-10-24 @ 19:00) (109/65 - 139/85)  ABP(mean): 105 (09-10-24 @ 19:00) (81 - 105)  RR: 14 (09-10-24 @ 19:00) (14 - 18)  SpO2: 100% (09-10-24 @ 19:00) (98% - 100%)    I&O's Summary    10 Sep 2024 07:01  -  10 Sep 2024 19:39  --------------------------------------------------------  IN: 603.8 mL / OUT: 275 mL / NET: 328.8 mL      Mode: AC/ CMV (Assist Control/ Continuous Mandatory Ventilation)  RR (machine): 14  TV (machine): 650  FiO2: 100  PEEP: 5  ITime: 1  MAP: 9  PIP: 26        ABG - ( 10 Sep 2024 17:37 )  pH: 7.34  /  pCO2: 43    /  pO2: 433   / HCO3: 23    / Base Excess: -2.6  /  SaO2: 100.0                                   13.3   16.40 )-----------( x        ( 10 Sep 2024 17:47 )             39.9     10 Sep 2024 17:47    142    |  105    |  14     ----------------------------<  155    4.2     |  20     |  1.05     Ca    9.2        10 Sep 2024 17:47    TPro  5.5    /  Alb  3.3    /  TBili  2.6    /  DBili  x      /  AST  59     /  ALT  15     /  AlkPhos  70     10 Sep 2024 17:47    PT/INR - ( 10 Sep 2024 17:41 )   PT: 13.3 sec;   INR: 1.28 ratio         PTT - ( 10 Sep 2024 17:41 )  PTT:28.7 sec      MEDICATIONS  (STANDING):  acetaminophen     Tablet .. 650 milliGRAM(s) Oral every 6 hours  aMIOdarone    Tablet 400 milliGRAM(s) Oral two times a day  aMIOdarone Infusion 1 mG/Min IV Continuous <Continuous>  aMIOdarone Infusion 1 mG/Min IV Continuous <Continuous>  ascorbic acid 500 milliGRAM(s) Oral two times a day  aspirin enteric coated 81 milliGRAM(s) Oral daily  aspirin Suppository 300 milliGRAM(s) Rectal once  cefuroxime  IVPB 1500 milliGRAM(s) IV Intermittent every 8 hours  chlorhexidine 0.12% Liquid 15 milliLiter(s) Oral Mucosa every 12 hours  chlorhexidine 0.12% Liquid 15 milliLiter(s) Oral Mucosa every 12 hours  chlorhexidine 2% Cloths 1 Application(s) Topical daily  chlorhexidine 4% Liquid 1 Application(s) Topical daily  dexMEDEtomidine Infusion 1 MICROgram(s)/kG/Hr IV Continuous <Continuous>  dextrose 50% Injectable 50 milliLiter(s) IV Push every 15 minutes  dextrose 50% Injectable 25 milliLiter(s) IV Push every 15 minutes  DOBUTamine Infusion 4 MICROgram(s)/kG/Min IV Continuous <Continuous>  famotidine Injectable 20 milliGRAM(s) IV Push every 12 hours  gabapentin 100 milliGRAM(s) Oral every 8 hours  insulin regular Infusion 3 Unit(s)/Hr IV Continuous <Continuous>  meperidine     Injectable 25 milliGRAM(s) IV Push once  mupirocin 2% Nasal 1 Application(s) Both Nostrils every 12 hours  niCARdipine Infusion 1 mG/Hr IV Continuous <Continuous>  potassium chloride  10 mEq/50 mL IVPB 10 milliEquivalent(s) IV Intermittent every 1 hour  potassium chloride  10 mEq/50 mL IVPB 10 milliEquivalent(s) IV Intermittent every 1 hour  potassium chloride  10 mEq/50 mL IVPB 10 milliEquivalent(s) IV Intermittent every 1 hour  sodium chloride 0.9% lock flush 3 milliLiter(s) IV Push every 8 hours  sodium chloride 0.9%. 1000 milliLiter(s) IV Continuous <Continuous>    Home Medications:  metoprolol succinate 50 mg oral tablet, extended release: 1 tab(s) orally once a day (at bedtime) (06 Sep 2024 14:29)    PHYSICAL EXAM:  Gen : no acute distress  Neck: No LAD, No JVD  Respiratory: decreased in the bases  Cardiovascular: S1 and S2, RRR, no M/G/R  Gastrointestinal: BS+, soft, NT/ND  Extremities: No peripheral edema  Vascular: 2+ peripheral pulses  Neurological: A/O x 3, no focal deficits  Incision: clean dry/ no sign of infection  Lines: no sign of infection      S/p Transverse Hemiarch on 9/10/24  Acute post operative respiratory failure  Acute blood loss anemia   Post operative pain   Lactic acidosis  Encounter for ventilator weaning      Neuro  Neuro assessment  Sedation with Precedex  Tylenol, Gabapentin, Meperidine, PRN Oxycodone, and PRN Dilaudid for pain management.    CVS   S/p Transverse Hemiarch on 9/10/24  EF 50-55%  Inotrope - Dobutamine 4 mcg/kg/min  Amiodarone for heart rate control  Cardene for BP management  Sinus Rhythm  Chest tube management  s/p Transverse Hemiarch today      Pulm   Ventilator weaning as tolerated   Fast track extubation     GI   NPO for now  Pepcid for stress ulcer prophylaxis        Monitor UOP  Correct Electrolytes K >4.0-4.5 Mag 2.0-2.5  Garcia catheter for strict I/O measurements.    Endo   A1c 6.2%  Glycemic control per protocol Glucose <180  Thyroid     Heme   Correct coagulapathy, monitor for bleeding  ASA to start tomorrow  P2Y12  Acute blood loss anemia expected post operative loss - IV Fe, Epo    ID   Cefuroxime for perioperative antibiotic coverage  MRSA screen         Critical care time spent    min       Care plan discussed with the ICU care team.   Patient remains critical, at risk for life threatening decompensation.    I have spent 30 minutes providing critical care management to this patient.    By signing my name below, I, Avril Batista, attest that this documentation has been prepared under the direction and in the presence of Noemi Sinclair MD.   Electronically signed: Avril Batista, 09-10-24 @ 19:39    I, Noemi Sinclair, personally performed the services described in this documentation. all medical record entries made by the scribe were at my direction and in my presence. I have reviewed the chart and agree that the record reflects my personal performance and is accurate and complete  Electronically signed: Noemi Sinclair MD.  POD # 0 - s/p Transverse Hemiarch on 9/10/24    Brief history : 48 year old male with PMH of known aortic root dilatation / aortic dilatation increased to 5.6 cm from 4.8 in 2019 and new onset atrial fibrillation hospitalization on 7/2024 was on amiodarone and Eliquis (stopped as per Dr. Serrano as per pt, only on metoprolol), newly  diagnosed hyperthyroidism 7/2024/ Graves Disease not on meds ( asymptomatic/ saw endo 7/30/2024 pending work ups after heart surgery as per pt), planned for Valve sparing Aortic valve replacement , replacement of Ascending aorta, Transverse Hemiarch on 9/10/2024 w/Dr. Serrano. Patient's outpatient preop labs reveals low TSH, Patient asked to present to the hospital 9/9/24 for direct admission for evaluation prior to the OR 9/10.    24 hour events :     ICU Vital Signs Last 24 Hrs  T(C): 37.1 (09-10-24 @ 18:00), Max: 37.1 (09-10-24 @ 18:00)  T(F): 98.7 (09-10-24 @ 18:00), Max: 98.7 (09-10-24 @ 18:00)  HR: 79 (09-10-24 @ 19:00) (73 - 88)  BP: 121/72 (09-10-24 @ 08:23) (121/72 - 122/87)  BP(mean): --  ABP: 139/85 (09-10-24 @ 19:00) (109/65 - 139/85)  ABP(mean): 105 (09-10-24 @ 19:00) (81 - 105)  RR: 14 (09-10-24 @ 19:00) (14 - 18)  SpO2: 100% (09-10-24 @ 19:00) (98% - 100%)    I&O's Summary    10 Sep 2024 07:01  -  10 Sep 2024 19:39  --------------------------------------------------------  IN: 603.8 mL / OUT: 275 mL / NET: 328.8 mL      Mode: AC/ CMV (Assist Control/ Continuous Mandatory Ventilation)  RR (machine): 14  TV (machine): 650  FiO2: 100  PEEP: 5  ITime: 1  MAP: 9  PIP: 26        ABG - ( 10 Sep 2024 17:37 )  pH: 7.34  /  pCO2: 43    /  pO2: 433   / HCO3: 23    / Base Excess: -2.6  /  SaO2: 100.0                                   13.3   16.40 )-----------( x        ( 10 Sep 2024 17:47 )             39.9     10 Sep 2024 17:47    142    |  105    |  14     ----------------------------<  155    4.2     |  20     |  1.05     Ca    9.2        10 Sep 2024 17:47    TPro  5.5    /  Alb  3.3    /  TBili  2.6    /  DBili  x      /  AST  59     /  ALT  15     /  AlkPhos  70     10 Sep 2024 17:47    PT/INR - ( 10 Sep 2024 17:41 )   PT: 13.3 sec;   INR: 1.28 ratio         PTT - ( 10 Sep 2024 17:41 )  PTT:28.7 sec      MEDICATIONS  (STANDING):  acetaminophen     Tablet .. 650 milliGRAM(s) Oral every 6 hours  aMIOdarone    Tablet 400 milliGRAM(s) Oral two times a day  aMIOdarone Infusion 1 mG/Min IV Continuous <Continuous>  aMIOdarone Infusion 1 mG/Min IV Continuous <Continuous>  ascorbic acid 500 milliGRAM(s) Oral two times a day  aspirin enteric coated 81 milliGRAM(s) Oral daily  aspirin Suppository 300 milliGRAM(s) Rectal once  cefuroxime  IVPB 1500 milliGRAM(s) IV Intermittent every 8 hours  chlorhexidine 0.12% Liquid 15 milliLiter(s) Oral Mucosa every 12 hours  chlorhexidine 0.12% Liquid 15 milliLiter(s) Oral Mucosa every 12 hours  chlorhexidine 2% Cloths 1 Application(s) Topical daily  chlorhexidine 4% Liquid 1 Application(s) Topical daily  dexMEDEtomidine Infusion 1 MICROgram(s)/kG/Hr IV Continuous <Continuous>  dextrose 50% Injectable 50 milliLiter(s) IV Push every 15 minutes  dextrose 50% Injectable 25 milliLiter(s) IV Push every 15 minutes  DOBUTamine Infusion 4 MICROgram(s)/kG/Min IV Continuous <Continuous>  famotidine Injectable 20 milliGRAM(s) IV Push every 12 hours  gabapentin 100 milliGRAM(s) Oral every 8 hours  insulin regular Infusion 3 Unit(s)/Hr IV Continuous <Continuous>  meperidine     Injectable 25 milliGRAM(s) IV Push once  mupirocin 2% Nasal 1 Application(s) Both Nostrils every 12 hours  niCARdipine Infusion 1 mG/Hr IV Continuous <Continuous>  potassium chloride  10 mEq/50 mL IVPB 10 milliEquivalent(s) IV Intermittent every 1 hour  potassium chloride  10 mEq/50 mL IVPB 10 milliEquivalent(s) IV Intermittent every 1 hour  potassium chloride  10 mEq/50 mL IVPB 10 milliEquivalent(s) IV Intermittent every 1 hour  sodium chloride 0.9% lock flush 3 milliLiter(s) IV Push every 8 hours  sodium chloride 0.9%. 1000 milliLiter(s) IV Continuous <Continuous>    Home Medications:  metoprolol succinate 50 mg oral tablet, extended release: 1 tab(s) orally once a day (at bedtime) (06 Sep 2024 14:29)    PHYSICAL EXAM:  Gen : no acute distress  Neck: No LAD, No JVD  Respiratory: decreased in the bases  Cardiovascular: S1 and S2, RRR, no M/G/R  Gastrointestinal: BS+, soft, NT/ND  Extremities: No peripheral edema  Vascular: 2+ peripheral pulses  Neurological: A/O x 3, no focal deficits  Incision: clean dry/ no sign of infection  Lines: no sign of infection      S/p Transverse Hemiarch on 9/10/24  Acute post operative respiratory failure  Acute blood loss anemia   Post operative pain   Lactic acidosis  Encounter for ventilator weaning      Neuro  Neuro assessment  Sedation with Precedex  Tylenol, Gabapentin, Meperidine, PRN Oxycodone, and PRN Dilaudid for pain management.    CVS   S/p Transverse Hemiarch on 9/10/24  EF 50-55%  Inotrope - Dobutamine 4 mcg/kg/min  Amiodarone for heart rate control  Cardene for BP management  Sinus Rhythm  Chest tube management  s/p Transverse Hemiarch today      Pulm   Ventilator weaning as tolerated   Fast track extubation     GI   NPO for now  Pepcid for stress ulcer prophylaxis        Monitor UOP  Correct Electrolytes K >4.0-4.5 Mag 2.0-2.5  Garcia catheter for strict I/O measurements.    Endo   A1c 6.2%  Glycemic control per protocol Glucose <180  Thyroid     Heme   Correct coagulapathy, monitor for bleeding  ASA to start tomorrow  P2Y12  Acute blood loss anemia expected post operative loss - IV Fe, Epo    ID   Cefuroxime for perioperative antibiotic coverage  MRSA screen         Critical care time spent  60  min       Care plan discussed with the ICU care team.   Patient remains critical, at risk for life threatening decompensation.    I have spent 30 minutes providing critical care management to this patient.    By signing my name below, I, Avril Batista, attest that this documentation has been prepared under the direction and in the presence of Noemi Sinclair MD.   Electronically signed: Avril Batista, 09-10-24 @ 19:39    I, Noemi Sinclair, personally performed the services described in this documentation. all medical record entries made by the scribe were at my direction and in my presence. I have reviewed the chart and agree that the record reflects my personal performance and is accurate and complete  Electronically signed: Noemi Sinclair MD.  POD # 0 - s/p valve sparing root, hemiarch replacement,     Brief history :   49 yo with strong family history of Aortic disease known AA aneurysm Aortic root 4.8 cm 2018 admitted in July with complaint of HA/new on set afib.    7/2024 ECHO showed nl EF, Aortic root dilatation measuring 5.1 cm  CCTA no CAD, Aortic root and ascending aorta replacement      PMH:  AFib, Grave's dz    9/10 Valve sparing root AA replacement, AFib ablation, NORIS ligation  Echo nl EF, mild RV dysfunction     24 hour events :     ICU Vital Signs Last 24 Hrs  T(C): 37.1 (09-10-24 @ 18:00), Max: 37.1 (09-10-24 @ 18:00)  T(F): 98.7 (09-10-24 @ 18:00), Max: 98.7 (09-10-24 @ 18:00)  HR: 79 (09-10-24 @ 19:00) (73 - 88)  BP: 121/72 (09-10-24 @ 08:23) (121/72 - 122/87)  ABP: 139/85 (09-10-24 @ 19:00) (109/65 - 139/85)  ABP(mean): 105 (09-10-24 @ 19:00) (81 - 105)  RR: 14 (09-10-24 @ 19:00) (14 - 18)  SpO2: 100% (09-10-24 @ 19:00) (98% - 100%)    I&O's Summary    10 Sep 2024 07:01  -  10 Sep 2024 19:39  --------------------------------------------------------  IN: 603.8 mL / OUT: 275 mL / NET: 328.8 mL      Mode: AC/ CMV (Assist Control/ Continuous Mandatory Ventilation)  RR (machine): 14  TV (machine): 650  FiO2: 100  PEEP: 5  ITime: 1  MAP: 9  PIP: 26      ABG - ( 10 Sep 2024 17:37 )  pH: 7.34  /  pCO2: 43    /  pO2: 433   / HCO3: 23    / Base Excess: -2.6  /  SaO2: 100.0                                  13.3   16.40 )-----------( 99       ( 10 Sep 2024 17:47 )             39.9       10 Sep 2024 17:47    142    |  105    |  14     ----------------------------<  155    4.2     |  20     |  1.05     Ca    9.2        10 Sep 2024 17:47    TPro  5.5    /  Alb  3.3    /  TBili  2.6    /  DBili  x      /  AST  59     /  ALT  15     /  AlkPhos  70     10 Sep 2024 17:47    PT/INR - ( 10 Sep 2024 17:41 )   PT: 13.3 sec;   INR: 1.28 ratio    PTT - ( 10 Sep 2024 17:41 )  PTT:28.7 sec      MEDICATIONS  (STANDING):  acetaminophen     Tablet .. 650 milliGRAM(s) Oral every 6 hours  aMIOdarone    Tablet 400 milliGRAM(s) Oral two times a day  ascorbic acid 500 milliGRAM(s) Oral two times a day  aspirin enteric coated 81 milliGRAM(s) Oral daily  aspirin Suppository 300 milliGRAM(s) Rectal once  cefuroxime  IVPB 1500 milliGRAM(s) IV Intermittent every 8 hours  dexMEDEtomidine Infusion 1 MICROgram(s)/kG/Hr IV Continuous <Continuous>s  DOBUTamine Infusion 4 MICROgram(s)/kG/Min IV Continuous <Continuous>  famotidine Injectable 20 milliGRAM(s) IV Push every 12 hours  gabapentin 100 milliGRAM(s) Oral every 8 hours  insulin regular Infusion 3 Unit(s)/Hr IV Continuous <Continuous>  niCARdipine Infusion 1 mG/Hr IV Continuous <Continuous>      Home Medications:  metoprolol succinate 50 mg oral tablet, extended release: 1 tab(s) orally once a day (at bedtime) (06 Sep 2024 14:29)    PHYSICAL EXAM:  Gen : no acute distress  Neck: No LAD, No JVD  Respiratory: decreased in the bases  Cardiovascular: S1 and S2, RRR, no M/G/R  Gastrointestinal: BS+, soft, NT/ND  Extremities: No peripheral edema  Vascular: 2+ peripheral pulses  Neurological: A/O x 3, no focal deficits  Incision: clean dry/ no sign of infection  Lines: no sign of infection      S/p valve sparing root Hemiarch replacement   Acute post operative respiratory failure  Acute blood loss anemia   Post operative pain   Lactic acidosis  Encounter for ventilator weaning      Neuro  Neuro assessment  Sedation with Precedex  Tylenol, Gabapentin, Meperidine, PRN Oxycodone, and PRN Dilaudid for pain management.    CVS   s/p valve sparing root, afib ablation,  NORIS clip   EF 50-55%  Inotrope - Dobutamine 4 mcg/kg/min for management of Mild RV dysfunction    SBP goal M<130  Sinus Rhythm - s/p ablation, amio drip to maintain NSR post AFIB ablation   Chest tube management      Pulm   Ventilator weaning as tolerated   Fast track extubation     GI   NPO for now  Pepcid for stress ulcer prophylaxis        Monitor UOP  Correct Electrolytes K >4.0-4.5 Mag 2.0-2.5  Garcia catheter for strict I/O measurements.    Endo   A1c 6.2%  Glycemic control per protocol Glucose <180  Thyroid  - history of Grave's     Heme   Correct coagulapathy, monitor for bleeding  ASA to start tomorrow  P2Y12  Acute blood loss anemia expected post operative loss - IV Fe, Epo    ID   Cefuroxime for perioperative antibiotic coverage  MRSA screen         Critical care time spent  70  min       Care plan discussed with the ICU care team.   Patient remains critical, at risk for life threatening decompensation.    I have spent 30 minutes providing critical care management to this patient.    By signing my name below, I, Avril Batista, attest that this documentation has been prepared under the direction and in the presence of Noemi Sinclair MD.   Electronically signed: Avril Batista, 09-10-24 @ 19:39    I, Noemi Sinclair, personally performed the services described in this documentation. all medical record entries made by the scribe were at my direction and in my presence. I have reviewed the chart and agree that the record reflects my personal performance and is accurate and complete  Electronically signed: Noemi Sinclair MD.  POD # 0 - s/p valve sparing root, AA replacement, Encompass ablation, NORIS clip     Brief history :   49 yo with strong family history of Aortic disease known AA aneurysm Aortic root 4.8 cm 2018 admitted in July with complaint of HA/new on set afib.    7/2024 ECHO showed nl EF, Aortic root dilatation measuring 5.1 cm  CCTA no CAD, Aortic root and ascending aorta replacement      PMH:  AFib, Grave's dz    9/10 Valve sparing root AA replacement, Encompass ablation, NORIS ligation  Echo nl EF, mild RV dysfunction     24 hour events :     ICU Vital Signs Last 24 Hrs  T(C): 37.1 (09-10-24 @ 18:00), Max: 37.1 (09-10-24 @ 18:00)  T(F): 98.7 (09-10-24 @ 18:00), Max: 98.7 (09-10-24 @ 18:00)  HR: 79 (09-10-24 @ 19:00) (73 - 88)  BP: 121/72 (09-10-24 @ 08:23) (121/72 - 122/87)  ABP: 139/85 (09-10-24 @ 19:00) (109/65 - 139/85)  ABP(mean): 105 (09-10-24 @ 19:00) (81 - 105)  RR: 14 (09-10-24 @ 19:00) (14 - 18)  SpO2: 100% (09-10-24 @ 19:00) (98% - 100%)    I&O's Summary    10 Sep 2024 07:01  -  10 Sep 2024 19:39  --------------------------------------------------------  IN: 603.8 mL / OUT: 275 mL / NET: 328.8 mL      Mode: AC/ CMV (Assist Control/ Continuous Mandatory Ventilation)  RR (machine): 14  TV (machine): 650  FiO2: 100  PEEP: 5  ITime: 1  MAP: 9  PIP: 26      ABG - ( 10 Sep 2024 17:37 )  pH: 7.34  /  pCO2: 43    /  pO2: 433   / HCO3: 23    / Base Excess: -2.6  /  SaO2: 100.0                                  13.3   16.40 )-----------( 99       ( 10 Sep 2024 17:47 )             39.9       10 Sep 2024 17:47    142    |  105    |  14     ----------------------------<  155    4.2     |  20     |  1.05     Ca    9.2        10 Sep 2024 17:47    TPro  5.5    /  Alb  3.3    /  TBili  2.6    /  DBili  x      /  AST  59     /  ALT  15     /  AlkPhos  70     10 Sep 2024 17:47    PT/INR - ( 10 Sep 2024 17:41 )   PT: 13.3 sec;   INR: 1.28 ratio    PTT - ( 10 Sep 2024 17:41 )  PTT:28.7 sec      MEDICATIONS  (STANDING):  acetaminophen     Tablet .. 650 milliGRAM(s) Oral every 6 hours  aMIOdarone    Tablet 400 milliGRAM(s) Oral two times a day  ascorbic acid 500 milliGRAM(s) Oral two times a day  aspirin enteric coated 81 milliGRAM(s) Oral daily  aspirin Suppository 300 milliGRAM(s) Rectal once  cefuroxime  IVPB 1500 milliGRAM(s) IV Intermittent every 8 hours  dexMEDEtomidine Infusion 1 MICROgram(s)/kG/Hr IV Continuous <Continuous>s  DOBUTamine Infusion 4 MICROgram(s)/kG/Min IV Continuous <Continuous>  famotidine Injectable 20 milliGRAM(s) IV Push every 12 hours  gabapentin 100 milliGRAM(s) Oral every 8 hours  insulin regular Infusion 3 Unit(s)/Hr IV Continuous <Continuous>  niCARdipine Infusion 1 mG/Hr IV Continuous <Continuous>      Home Medications:  metoprolol succinate 50 mg oral tablet, extended release: 1 tab(s) orally once a day (at bedtime) (06 Sep 2024 14:29)    PHYSICAL EXAM:  Gen : no acute distress  Neck: No LAD, No JVD  Respiratory: decreased in the bases  Cardiovascular: S1 and S2, RRR, no M/G/R  Gastrointestinal: BS+, soft, NT/ND  Extremities: No peripheral edema  Vascular: 2+ peripheral pulses  Neurological: A/O x 3, no focal deficits  Incision: clean dry/ no sign of infection  Lines: no sign of infection      S/p valve sparing root Hemiarch replacement   Acute post operative respiratory failure  Acute blood loss anemia   Post operative pain   Lactic acidosis  Encounter for ventilator weaning      Neuro  Neuro assessment  Sedation with Precedex  Tylenol, Gabapentin, Meperidine, PRN Oxycodone, and PRN Dilaudid for pain management.    CVS   s/p valve sparing root, afib ablation,  NORIS clip   EF 50-55%  Inotrope - Dobutamine 4 mcg/kg/min for management of Mild RV dysfunction    SBP goal M<130  Sinus Rhythm - s/p ablation, amio drip to maintain NSR post AFIB ablation   Chest tube management      Pulm   Ventilator weaning as tolerated   Fast track extubation     GI   NPO for now  Pepcid for stress ulcer prophylaxis        Monitor UOP  Correct Electrolytes K >4.0-4.5 Mag 2.0-2.5  Garcia catheter for strict I/O measurements.    Endo   A1c 6.2%  Glycemic control per protocol Glucose <180  Thyroid  - history of Grave's     Heme   Correct coagulapathy, monitor for bleeding  ASA to start tomorrow  P2Y12  Acute blood loss anemia expected post operative loss - IV Fe, Epo    ID   Cefuroxime for perioperative antibiotic coverage  MRSA screen         Critical care time spent  70  min       Care plan discussed with the ICU care team.   Patient remains critical, at risk for life threatening decompensation.    I have spent 30 minutes providing critical care management to this patient.    By signing my name below, I, Avril Batista, attest that this documentation has been prepared under the direction and in the presence of Noemi Sinclair MD.   Electronically signed: Avril Batista, 09-10-24 @ 19:39    I, Noemi Sinclair, personally performed the services described in this documentation. all medical record entries made by the scribe were at my direction and in my presence. I have reviewed the chart and agree that the record reflects my personal performance and is accurate and complete  Electronically signed: Noemi Sinclair MD.

## 2024-09-10 NOTE — PRE-ANESTHESIA EVALUATION ADULT - NSANTHPEFT_GEN_ALL_CORE
General: NAD, resting comfortably in bed   HEENT: normocephalic, atraumatic, moist mucosa, clear oropharynx  Neck: normal ROM, no JVD, no masses or goiter   Heart: RRR  Lungs: normal work of breathing  Neuro: AOX3, no focal deficits

## 2024-09-10 NOTE — BRIEF OPERATIVE NOTE - COMMENTS
Gtts: , levo, precedex  Aortic cross clamp: 212 min  No unexpected foreign bodies were apparent on preliminary review of post-op x-ray. Reviewed by Dr. Serrano

## 2024-09-10 NOTE — PRE PROCEDURE NOTE - PRE PROCEDURE EVALUATION
Cardiac Surgery Pre-op Note:    Surgeon: Dr. Serrano    Procedure: Valve sparing Aortic valve replacement , replacement of Ascending aorta, Transverse Hemiarch on 9/10/2024    HPI:  48 year old male with PMH of known aortic root dilatation / aortic dilatation increased to 5.6 cm from 4.8 in 2019 and new onset atrial fibrillation hospitalization on 2024 was on amiodarone and Eliquis (stopped as per Dr. Serrano as per pt, only on metoprolol), newly  diagnosed hyperthyroidism 2024/ Graves Disease not on meds ( asymptomatic/ saw endo 2024 pending work ups after heart surgery as per pt), high prolactin level 2024 followed by Neurology ( pending open air MRI  claustrophobia and further work up after open heart surgery as per pt), planned for Valve sparing Aortic valve replacement , replacement of Ascending aorta, Transverse Hemiarch on 9/10/2024 w/Dr. Serrano. Patient's outpatient preop labs reveals low TSH, Patient asked to present to the hospital 24 for direct admission for evaluation prior to the OR 9/10. (09 Sep 2024 21:49)      PAST MEDICAL & SURGICAL HISTORY:  Dilated aortic root      Marijuana use      History of prediabetes      Paroxysmal atrial fibrillation      Mild asthma      Hyperthyroidism      Thyroid nodule      Elevated prolactin level      Graves disease      H/O cardiomegaly      Enlarged thyroid gland      Thoracic aortic aneurysm      H/O wisdom tooth extraction          MEDICATIONS  (STANDING):  cefuroxime  IVPB 1500 milliGRAM(s) IV Intermittent once  chlorhexidine 0.12% Liquid 30 milliLiter(s) Swish and Spit once  metoprolol succinate ER 50 milliGRAM(s) Oral daily  mupirocin 2% Nasal 1 Application(s) Both Nostrils every 12 hours  sodium chloride 0.9% lock flush 3 milliLiter(s) IV Push every 8 hours    MEDICATIONS  (PRN):      Vital Signs Last 24 Hrs  T(C): 36.4 (24 @ 21:10), Max: 36.4 (24 @ 21:10)  T(F): 97.5 (24 @ 21:10), Max: 97.5 (24 @ 21:10)  HR: 88 (24 @ 21:10) (88 - 88)  BP: 122/87 (24 @ 21:10) (122/87 - 122/87)  RR: 18 (24 @ 21:10) (18 - 18)  SpO2: 98% (24 @ 21:10) (98% - 98%)          ABO Interpretation: A ( @ 22:40)     Daily Height in cm: 198.12 (09 Sep 2024 21:10)    Daily   Admit Wt: Drug Dosing Weight  Height (cm): 198.1 (09 Sep 2024 21:10)  Weight (kg): 117.1 (09 Sep 2024 21:10)  BMI (kg/m2): 29.8 (09 Sep 2024 21:10)  BSA (m2): 2.52 (09 Sep 2024 21:10)    Labs:                        13.3   5.10  )-----------( 155      ( 09 Sep 2024 22:26 )             39.6         140  |  103  |  13  ----------------------------<  94  4.1   |  26  |  0.90    Ca    9.3      09 Sep 2024 22:26    TPro  7.0  /  Alb  3.9  /  TBili  0.4  /  DBili  x   /  AST  13  /  ALT  12  /  AlkPhos  86        ABO Interpretation: A ( @ 22:40)    Thyroid Panel:  @ 15:07/<0.01  --/--/-- Pending     MRSA: MRSA PCR Result.: NotDetec ( @ 15:24)   / MSSA: Detected   Urinalysis Basic - ( 09 Sep 2024 23:50 )  Color: Yellow / Appearance: Clear / S.023 / pH: x  Gluc: x / Ketone: Trace mg/dL  / Bili: Negative / Urobili: 1.0 mg/dL   Blood: x / Protein: Negative mg/dL / Nitrite: Negative   Leuk Esterase: Negative / RBC: x / WBC x   Sq Epi: x / Non Sq Epi: x / Bacteria: x    CXR: Pending    Carotid Duplex:  < from: VA Duplex Carotid, Bilat (24 @ 15:08) >  IMPRESSION: No significant hemodynamic stenosis of either carotid artery.    Measurement of carotid stenosis is based onupdated recommendations for   carotid stenosis interpretation criteria from the Intersocietal   Accreditation Commission (IAC) Vascular Testing Board, modified from the   Society of Radiologists in Ultrasound (SRU) Consensus Conference Criteria   forInternal Carotid Artery Stenosis.    --- End of Report ---    < end of copied text >    PFT's: Results pending     Echocardiogram: < from: TTE W or WO Ultrasound Enhancing Agent (24 @ 06:47) >  CONCLUSIONS:      1. Left ventricular cavity is normal in size. Left ventricular wall thickness is normal. Left ventricular systolic function is normal with an ejection fraction of 59 % by Nichole's method of disks. There are no regional wall motion abnormalities seen.   2. Aortic root at the sinuses of Valsalva is aneurysmal, measuring 5.10 cm (indexed 2.05 cm/m²). Ascending aorta diameter is aneurysmal, measuring 4.80 cm (indexed 1.93 cm/m²).   3. Trace aortic regurgitation.    < end of copied text >    PHYSICAL EXAM:  Neuro: A&Ox3, NAD  Pulm: B/L BS CTA  CV: Afib,+S1S2  Abd: Soft, NT/ND +BSX4Q  Ext: B/L LE no edema, +PP B/L, no calf tenderness    Pt has AICD/PPM [ ] Yes  [x] No             Brand Name:  Pre-op Beta Blocker ordered within 24 hrs of surgery ([facility] ONLY)?  [x] Yes  [ ] No  If not, Why?  Type & Cross  [ x] Yes  [ ] No  NPO after Midnight [x] Yes  [ ] No  Pre-op ABX ordered, to be taped on chart:  [x] Yes  [ ] No     Hibiclens/Peridex ordered [x] Yes  [ ] No  Intraop on Hold: PRBCs, CXR, CELSO [x]   Consent obtained  [ x ] Yes  [ ] No

## 2024-09-10 NOTE — PRE-OP CHECKLIST - SELECT TESTS ORDERED
BMP/CBC/CMP/PT/PTT/INR/Hepatic Function/Type and Cross/Type and Screen/Urinalysis/EKG/CXR/Results in MD note/POCT Blood Glucose

## 2024-09-11 DIAGNOSIS — E05.90 THYROTOXICOSIS, UNSPECIFIED WITHOUT THYROTOXIC CRISIS OR STORM: ICD-10-CM

## 2024-09-11 LAB
ALBUMIN SERPL ELPH-MCNC: 3.6 G/DL — SIGNIFICANT CHANGE UP (ref 3.3–5)
ALP SERPL-CCNC: 75 U/L — SIGNIFICANT CHANGE UP (ref 40–120)
ALT FLD-CCNC: 20 U/L — SIGNIFICANT CHANGE UP (ref 10–45)
ANION GAP SERPL CALC-SCNC: 11 MMOL/L — SIGNIFICANT CHANGE UP (ref 5–17)
ANION GAP SERPL CALC-SCNC: 15 MMOL/L — SIGNIFICANT CHANGE UP (ref 5–17)
APTT BLD: 26.9 SEC — SIGNIFICANT CHANGE UP (ref 24.5–35.6)
AST SERPL-CCNC: 84 U/L — HIGH (ref 10–40)
BASE EXCESS BLDV CALC-SCNC: -1.9 MMOL/L — SIGNIFICANT CHANGE UP (ref -2–3)
BASE EXCESS BLDV CALC-SCNC: -3.8 MMOL/L — LOW (ref -2–3)
BASOPHILS # BLD AUTO: 0 K/UL — SIGNIFICANT CHANGE UP (ref 0–0.2)
BASOPHILS # BLD AUTO: 0.02 K/UL — SIGNIFICANT CHANGE UP (ref 0–0.2)
BASOPHILS NFR BLD AUTO: 0 % — SIGNIFICANT CHANGE UP (ref 0–2)
BASOPHILS NFR BLD AUTO: 0.2 % — SIGNIFICANT CHANGE UP (ref 0–2)
BILIRUB SERPL-MCNC: 3.8 MG/DL — HIGH (ref 0.2–1.2)
BUN SERPL-MCNC: 18 MG/DL — SIGNIFICANT CHANGE UP (ref 7–23)
BUN SERPL-MCNC: 19 MG/DL — SIGNIFICANT CHANGE UP (ref 7–23)
CALCIUM SERPL-MCNC: 8.7 MG/DL — SIGNIFICANT CHANGE UP (ref 8.4–10.5)
CALCIUM SERPL-MCNC: 8.8 MG/DL — SIGNIFICANT CHANGE UP (ref 8.4–10.5)
CHLORIDE SERPL-SCNC: 106 MMOL/L — SIGNIFICANT CHANGE UP (ref 96–108)
CHLORIDE SERPL-SCNC: 99 MMOL/L — SIGNIFICANT CHANGE UP (ref 96–108)
CO2 BLDV-SCNC: 26 MMOL/L — SIGNIFICANT CHANGE UP (ref 22–26)
CO2 BLDV-SCNC: 27 MMOL/L — HIGH (ref 22–26)
CO2 SERPL-SCNC: 20 MMOL/L — LOW (ref 22–31)
CO2 SERPL-SCNC: 22 MMOL/L — SIGNIFICANT CHANGE UP (ref 22–31)
CREAT SERPL-MCNC: 0.74 MG/DL — SIGNIFICANT CHANGE UP (ref 0.5–1.3)
CREAT SERPL-MCNC: 1.09 MG/DL — SIGNIFICANT CHANGE UP (ref 0.5–1.3)
DACRYOCYTES BLD QL SMEAR: SLIGHT — SIGNIFICANT CHANGE UP
EGFR: 113 ML/MIN/1.73M2 — SIGNIFICANT CHANGE UP
EGFR: 85 ML/MIN/1.73M2 — SIGNIFICANT CHANGE UP
EOSINOPHIL # BLD AUTO: 0 K/UL — SIGNIFICANT CHANGE UP (ref 0–0.5)
EOSINOPHIL # BLD AUTO: 0 K/UL — SIGNIFICANT CHANGE UP (ref 0–0.5)
EOSINOPHIL NFR BLD AUTO: 0 % — SIGNIFICANT CHANGE UP (ref 0–6)
EOSINOPHIL NFR BLD AUTO: 0 % — SIGNIFICANT CHANGE UP (ref 0–6)
GAS PNL BLDA: SIGNIFICANT CHANGE UP
GAS PNL BLDV: SIGNIFICANT CHANGE UP
GAS PNL BLDV: SIGNIFICANT CHANGE UP
GLUCOSE BLDC GLUCOMTR-MCNC: 106 MG/DL — HIGH (ref 70–99)
GLUCOSE BLDC GLUCOMTR-MCNC: 109 MG/DL — HIGH (ref 70–99)
GLUCOSE BLDC GLUCOMTR-MCNC: 111 MG/DL — HIGH (ref 70–99)
GLUCOSE BLDC GLUCOMTR-MCNC: 117 MG/DL — HIGH (ref 70–99)
GLUCOSE BLDC GLUCOMTR-MCNC: 117 MG/DL — HIGH (ref 70–99)
GLUCOSE BLDC GLUCOMTR-MCNC: 122 MG/DL — HIGH (ref 70–99)
GLUCOSE BLDC GLUCOMTR-MCNC: 125 MG/DL — HIGH (ref 70–99)
GLUCOSE BLDC GLUCOMTR-MCNC: 125 MG/DL — HIGH (ref 70–99)
GLUCOSE BLDC GLUCOMTR-MCNC: 129 MG/DL — HIGH (ref 70–99)
GLUCOSE BLDC GLUCOMTR-MCNC: 131 MG/DL — HIGH (ref 70–99)
GLUCOSE BLDC GLUCOMTR-MCNC: 131 MG/DL — HIGH (ref 70–99)
GLUCOSE BLDC GLUCOMTR-MCNC: 132 MG/DL — HIGH (ref 70–99)
GLUCOSE BLDC GLUCOMTR-MCNC: 134 MG/DL — HIGH (ref 70–99)
GLUCOSE BLDC GLUCOMTR-MCNC: 149 MG/DL — HIGH (ref 70–99)
GLUCOSE BLDC GLUCOMTR-MCNC: 149 MG/DL — HIGH (ref 70–99)
GLUCOSE BLDC GLUCOMTR-MCNC: 150 MG/DL — HIGH (ref 70–99)
GLUCOSE BLDC GLUCOMTR-MCNC: 150 MG/DL — HIGH (ref 70–99)
GLUCOSE BLDC GLUCOMTR-MCNC: 151 MG/DL — HIGH (ref 70–99)
GLUCOSE BLDC GLUCOMTR-MCNC: 193 MG/DL — HIGH (ref 70–99)
GLUCOSE BLDC GLUCOMTR-MCNC: 97 MG/DL — SIGNIFICANT CHANGE UP (ref 70–99)
GLUCOSE SERPL-MCNC: 111 MG/DL — HIGH (ref 70–99)
GLUCOSE SERPL-MCNC: 166 MG/DL — HIGH (ref 70–99)
HCO3 BLDV-SCNC: 24 MMOL/L — SIGNIFICANT CHANGE UP (ref 22–29)
HCO3 BLDV-SCNC: 26 MMOL/L — SIGNIFICANT CHANGE UP (ref 22–29)
HCT VFR BLD CALC: 36.4 % — LOW (ref 39–50)
HCT VFR BLD CALC: 42.1 % — SIGNIFICANT CHANGE UP (ref 39–50)
HGB BLD-MCNC: 12.5 G/DL — LOW (ref 13–17)
HGB BLD-MCNC: 13.9 G/DL — SIGNIFICANT CHANGE UP (ref 13–17)
HOROWITZ INDEX BLDV+IHG-RTO: 32 — SIGNIFICANT CHANGE UP
IMM GRANULOCYTES NFR BLD AUTO: 0.4 % — SIGNIFICANT CHANGE UP (ref 0–0.9)
INR BLD: 1.12 RATIO — SIGNIFICANT CHANGE UP (ref 0.85–1.18)
LYMPHOCYTES # BLD AUTO: 0.53 K/UL — LOW (ref 1–3.3)
LYMPHOCYTES # BLD AUTO: 1.12 K/UL — SIGNIFICANT CHANGE UP (ref 1–3.3)
LYMPHOCYTES # BLD AUTO: 4.1 % — LOW (ref 13–44)
LYMPHOCYTES # BLD AUTO: 5.2 % — LOW (ref 13–44)
MAGNESIUM SERPL-MCNC: 2.1 MG/DL — SIGNIFICANT CHANGE UP (ref 1.6–2.6)
MAGNESIUM SERPL-MCNC: 2.5 MG/DL — SIGNIFICANT CHANGE UP (ref 1.6–2.6)
MANUAL SMEAR VERIFICATION: SIGNIFICANT CHANGE UP
MCHC RBC-ENTMCNC: 25.7 PG — LOW (ref 27–34)
MCHC RBC-ENTMCNC: 26.3 PG — LOW (ref 27–34)
MCHC RBC-ENTMCNC: 33 GM/DL — SIGNIFICANT CHANGE UP (ref 32–36)
MCHC RBC-ENTMCNC: 34.3 GM/DL — SIGNIFICANT CHANGE UP (ref 32–36)
MCV RBC AUTO: 76.6 FL — LOW (ref 80–100)
MCV RBC AUTO: 78 FL — LOW (ref 80–100)
MONOCYTES # BLD AUTO: 0.45 K/UL — SIGNIFICANT CHANGE UP (ref 0–0.9)
MONOCYTES # BLD AUTO: 0.56 K/UL — SIGNIFICANT CHANGE UP (ref 0–0.9)
MONOCYTES NFR BLD AUTO: 2.6 % — SIGNIFICANT CHANGE UP (ref 2–14)
MONOCYTES NFR BLD AUTO: 3.5 % — SIGNIFICANT CHANGE UP (ref 2–14)
NEUTROPHILS # BLD AUTO: 11.94 K/UL — HIGH (ref 1.8–7.4)
NEUTROPHILS # BLD AUTO: 19.86 K/UL — HIGH (ref 1.8–7.4)
NEUTROPHILS NFR BLD AUTO: 87 % — HIGH (ref 43–77)
NEUTROPHILS NFR BLD AUTO: 91.8 % — HIGH (ref 43–77)
NEUTS BAND # BLD: 5.2 % — SIGNIFICANT CHANGE UP (ref 0–8)
NRBC # BLD: 0 /100 WBCS — SIGNIFICANT CHANGE UP (ref 0–0)
OVALOCYTES BLD QL SMEAR: SLIGHT — SIGNIFICANT CHANGE UP
PCO2 BLDV: 53 MMHG — SIGNIFICANT CHANGE UP (ref 42–55)
PCO2 BLDV: 57 MMHG — HIGH (ref 42–55)
PH BLDV: 7.24 — LOW (ref 7.32–7.43)
PH BLDV: 7.29 — LOW (ref 7.32–7.43)
PHOSPHATE SERPL-MCNC: 3.4 MG/DL — SIGNIFICANT CHANGE UP (ref 2.5–4.5)
PHOSPHATE SERPL-MCNC: 3.8 MG/DL — SIGNIFICANT CHANGE UP (ref 2.5–4.5)
PLAT MORPH BLD: ABNORMAL
PLATELET # BLD AUTO: 109 K/UL — LOW (ref 150–400)
PLATELET # BLD AUTO: 92 K/UL — LOW (ref 150–400)
PO2 BLDV: 39 MMHG — SIGNIFICANT CHANGE UP (ref 25–45)
PO2 BLDV: 44 MMHG — SIGNIFICANT CHANGE UP (ref 25–45)
POIKILOCYTOSIS BLD QL AUTO: SLIGHT — SIGNIFICANT CHANGE UP
POTASSIUM SERPL-MCNC: 4.3 MMOL/L — SIGNIFICANT CHANGE UP (ref 3.5–5.3)
POTASSIUM SERPL-MCNC: 4.5 MMOL/L — SIGNIFICANT CHANGE UP (ref 3.5–5.3)
POTASSIUM SERPL-SCNC: 4.3 MMOL/L — SIGNIFICANT CHANGE UP (ref 3.5–5.3)
POTASSIUM SERPL-SCNC: 4.5 MMOL/L — SIGNIFICANT CHANGE UP (ref 3.5–5.3)
PROT SERPL-MCNC: 6.1 G/DL — SIGNIFICANT CHANGE UP (ref 6–8.3)
PROTHROM AB SERPL-ACNC: 11.7 SEC — SIGNIFICANT CHANGE UP (ref 9.5–13)
RBC # BLD: 4.75 M/UL — SIGNIFICANT CHANGE UP (ref 4.2–5.8)
RBC # BLD: 5.4 M/UL — SIGNIFICANT CHANGE UP (ref 4.2–5.8)
RBC # FLD: 15.3 % — HIGH (ref 10.3–14.5)
RBC # FLD: 15.5 % — HIGH (ref 10.3–14.5)
RBC BLD AUTO: ABNORMAL
SAO2 % BLDV: 59.2 % — LOW (ref 67–88)
SAO2 % BLDV: 67.7 % — SIGNIFICANT CHANGE UP (ref 67–88)
SODIUM SERPL-SCNC: 132 MMOL/L — LOW (ref 135–145)
SODIUM SERPL-SCNC: 141 MMOL/L — SIGNIFICANT CHANGE UP (ref 135–145)
T4 FREE SERPL-MCNC: 1.4 NG/DL — SIGNIFICANT CHANGE UP (ref 0.9–1.8)
TARGETS BLD QL SMEAR: SLIGHT — SIGNIFICANT CHANGE UP
WBC # BLD: 12.99 K/UL — HIGH (ref 3.8–10.5)
WBC # BLD: 21.54 K/UL — HIGH (ref 3.8–10.5)
WBC # FLD AUTO: 12.99 K/UL — HIGH (ref 3.8–10.5)
WBC # FLD AUTO: 21.54 K/UL — HIGH (ref 3.8–10.5)

## 2024-09-11 PROCEDURE — 99292 CRITICAL CARE ADDL 30 MIN: CPT

## 2024-09-11 PROCEDURE — 99291 CRITICAL CARE FIRST HOUR: CPT

## 2024-09-11 PROCEDURE — 93010 ELECTROCARDIOGRAM REPORT: CPT

## 2024-09-11 PROCEDURE — 71045 X-RAY EXAM CHEST 1 VIEW: CPT | Mod: 26

## 2024-09-11 RX ORDER — HEPARIN SODIUM,BOVINE 1000/ML
5000 VIAL (ML) INJECTION EVERY 8 HOURS
Refills: 0 | Status: DISCONTINUED | OUTPATIENT
Start: 2024-09-11 | End: 2024-09-15

## 2024-09-11 RX ORDER — FUROSEMIDE 40 MG
40 TABLET ORAL ONCE
Refills: 0 | Status: COMPLETED | OUTPATIENT
Start: 2024-09-11 | End: 2024-09-11

## 2024-09-11 RX ORDER — ALBUMIN (HUMAN) 5 G/20ML
250 SOLUTION INTRAVENOUS ONCE
Refills: 0 | Status: COMPLETED | OUTPATIENT
Start: 2024-09-11 | End: 2024-09-11

## 2024-09-11 RX ORDER — ROPIVACAINE IN 0.9% SOD CHL/PF 0.1 %
0.05 PLASTIC BAG, INJECTION (ML) EPIDURAL
Qty: 8 | Refills: 0 | Status: DISCONTINUED | OUTPATIENT
Start: 2024-09-11 | End: 2024-09-12

## 2024-09-11 RX ORDER — OXYCODONE HYDROCHLORIDE 15 MG/1
400 TABLET ORAL EVERY 8 HOURS
Refills: 0 | Status: DISCONTINUED | OUTPATIENT
Start: 2024-09-11 | End: 2024-09-11

## 2024-09-11 RX ORDER — FUROSEMIDE 40 MG
20 TABLET ORAL ONCE
Refills: 0 | Status: COMPLETED | OUTPATIENT
Start: 2024-09-11 | End: 2024-09-11

## 2024-09-11 RX ORDER — OXYCODONE HYDROCHLORIDE 15 MG/1
200 TABLET ORAL DAILY
Refills: 0 | Status: DISCONTINUED | OUTPATIENT
Start: 2024-09-11 | End: 2024-09-11

## 2024-09-11 RX ORDER — MILRINONE LACTATE 1 MG/ML
0.12 VIAL (ML) INTRAVENOUS
Qty: 20 | Refills: 0 | Status: DISCONTINUED | OUTPATIENT
Start: 2024-09-11 | End: 2024-09-14

## 2024-09-11 RX ORDER — HYDROMORPHONE HYDROCHLORIDE 2 MG/1
0.5 TABLET ORAL ONCE
Refills: 0 | Status: DISCONTINUED | OUTPATIENT
Start: 2024-09-11 | End: 2024-09-11

## 2024-09-11 RX ORDER — OXYCODONE HYDROCHLORIDE 15 MG/1
TABLET ORAL
Refills: 0 | Status: DISCONTINUED | OUTPATIENT
Start: 2024-09-11 | End: 2024-09-11

## 2024-09-11 RX ORDER — CALCIUM GLUCONATE 61(648) MG
1 TABLET ORAL ONCE
Refills: 0 | Status: COMPLETED | OUTPATIENT
Start: 2024-09-11 | End: 2024-09-11

## 2024-09-11 RX ADMIN — Medication 100 MILLIGRAM(S): at 05:33

## 2024-09-11 RX ADMIN — OXYCODONE HYDROCHLORIDE 400 MILLIGRAM(S): 15 TABLET ORAL at 05:33

## 2024-09-11 RX ADMIN — ALBUMIN (HUMAN) 125 MILLILITER(S): 5 SOLUTION INTRAVENOUS at 01:35

## 2024-09-11 RX ADMIN — ALBUMIN (HUMAN) 125 MILLILITER(S): 5 SOLUTION INTRAVENOUS at 01:34

## 2024-09-11 RX ADMIN — Medication 500 MILLILITER(S): at 01:29

## 2024-09-11 RX ADMIN — Medication 5000 UNIT(S): at 21:12

## 2024-09-11 RX ADMIN — ACETAMINOPHEN 650 MILLIGRAM(S): 325 TABLET ORAL at 13:23

## 2024-09-11 RX ADMIN — CHLORHEXIDINE GLUCONATE 1 APPLICATION(S): 40 SOLUTION TOPICAL at 13:26

## 2024-09-11 RX ADMIN — Medication 1 APPLICATION(S): at 05:33

## 2024-09-11 RX ADMIN — HYDROMORPHONE HYDROCHLORIDE 0.5 MILLIGRAM(S): 2 TABLET ORAL at 17:00

## 2024-09-11 RX ADMIN — Medication 81 MILLIGRAM(S): at 13:24

## 2024-09-11 RX ADMIN — ACETAMINOPHEN 650 MILLIGRAM(S): 325 TABLET ORAL at 05:44

## 2024-09-11 RX ADMIN — Medication 2 TABLET(S): at 21:12

## 2024-09-11 RX ADMIN — SODIUM CHLORIDE 3 MILLILITER(S): 9 INJECTION INTRAMUSCULAR; INTRAVENOUS; SUBCUTANEOUS at 05:36

## 2024-09-11 RX ADMIN — Medication 500 MILLIGRAM(S): at 18:08

## 2024-09-11 RX ADMIN — CHLORHEXIDINE GLUCONATE 15 MILLILITER(S): 40 SOLUTION TOPICAL at 18:08

## 2024-09-11 RX ADMIN — Medication 100 MILLIGRAM(S): at 13:23

## 2024-09-11 RX ADMIN — SODIUM CHLORIDE 3 MILLILITER(S): 9 INJECTION INTRAMUSCULAR; INTRAVENOUS; SUBCUTANEOUS at 14:20

## 2024-09-11 RX ADMIN — Medication 8.78 MICROGRAM(S)/KG/MIN: at 20:40

## 2024-09-11 RX ADMIN — Medication 5000 UNIT(S): at 13:28

## 2024-09-11 RX ADMIN — Medication 20 MILLIGRAM(S): at 09:56

## 2024-09-11 RX ADMIN — POLYETHYLENE GLYCOL 3350 17 GRAM(S): 17 POWDER, FOR SOLUTION ORAL at 13:24

## 2024-09-11 RX ADMIN — Medication 100 MILLIGRAM(S): at 05:45

## 2024-09-11 RX ADMIN — Medication 500 MILLIGRAM(S): at 05:33

## 2024-09-11 RX ADMIN — ACETAMINOPHEN 650 MILLIGRAM(S): 325 TABLET ORAL at 14:00

## 2024-09-11 RX ADMIN — Medication 100 MILLIGRAM(S): at 13:24

## 2024-09-11 RX ADMIN — HYDROMORPHONE HYDROCHLORIDE 0.5 MILLIGRAM(S): 2 TABLET ORAL at 20:15

## 2024-09-11 RX ADMIN — ACETAMINOPHEN 650 MILLIGRAM(S): 325 TABLET ORAL at 07:00

## 2024-09-11 RX ADMIN — FAMOTIDINE 20 MILLIGRAM(S): 10 INJECTION INTRAVENOUS at 05:33

## 2024-09-11 RX ADMIN — Medication 1 APPLICATION(S): at 18:08

## 2024-09-11 RX ADMIN — Medication 40 MILLIGRAM(S): at 20:40

## 2024-09-11 RX ADMIN — HYDROMORPHONE HYDROCHLORIDE 0.5 MILLIGRAM(S): 2 TABLET ORAL at 20:00

## 2024-09-11 RX ADMIN — CHLORHEXIDINE GLUCONATE 1 APPLICATION(S): 40 SOLUTION TOPICAL at 13:29

## 2024-09-11 RX ADMIN — ACETAMINOPHEN 650 MILLIGRAM(S): 325 TABLET ORAL at 18:08

## 2024-09-11 RX ADMIN — Medication 100 MILLIGRAM(S): at 21:12

## 2024-09-11 RX ADMIN — SODIUM CHLORIDE 3 MILLILITER(S): 9 INJECTION INTRAMUSCULAR; INTRAVENOUS; SUBCUTANEOUS at 21:26

## 2024-09-11 RX ADMIN — Medication 100 GRAM(S): at 20:40

## 2024-09-11 RX ADMIN — Medication 25 GRAM(S): at 16:11

## 2024-09-11 NOTE — CONSULT NOTE ADULT - ASSESSMENT
48 year old male with known aortic root dilatation / aortic dilatation increased to 5.6 cm from 4.8 in 2019 and new onset atrial fibrillation hospitalization on 7/2024 was on amiodarone and Eliquis (stopped as per Dr. Serrano as per pt, only on metoprolol), newly  diagnosed hyperthyroidism 7/2024/ Graves Disease not on meds ( asymptomatic/ saw endo 7/30/2024 pending work ups after heart surgery as per pt), high prolactin level 7/2024 followed by Neurology ( pending open air MRI 2/2 claustrophobia and further work up after open heart surgery as per pt), planned for Valve sparing Aortic valve replacement , replacement of Ascending aorta, Transverse Hemiarch on 9/10/2024 w/Dr. Serrano. Patient's outpatient preop labs reveals low TSH, Patient asked to present to the hospital 9/9/24 for direct admission for evaluation prior to the OR 9/10.    7/14 ECHO- 2. Aortic root at the sinuses of Valsalva is aneurysmal, measuring 5.10 cm (indexed 2.05 cm/m²). Ascending aorta diameter is aneurysmal, measuring 4.80 cm   A1c 6.4  TSH low   elevated prolactin   S/p valve sparing root Hemiarch replacement 9/10    9/10 extubated in evening   post op exam non focal, walked on unit     Impression:   1) HA stable , none now   2) elevated prolactin    - open MRI outpatient. severely claustrophobic   - f/u endocrine   - on amio  - on gabapentin 100 TID   - ASA for now.  eventually doac for AF?    - telemetry  - PT/OT/SS/SLP, OOBC  - check FS, glucose control <180  - GI/DVT ppx  - Thank you for allowing me to participate in the care of this patient. Call with questions.   - remaining per ICU  - outpatient f/u on discharge   Patricio Mina MD  Vascular Neurology  Office: [phone]

## 2024-09-11 NOTE — CHART NOTE - NSCHARTNOTEFT_GEN_A_CORE
47 yo M PMH known aortic root dilatation now s/p valve sparing hemiarch replacement, POD1. Of note, patient w/ new onset atrial fibrillation 7/2024 iso hyperthyroidism, Endocrinology following for Graves Disease w/u. TSH on this admission <.01. He has not had AF on tele, in SR. Cannot use Amiodarone given hyperthyroid. No role for AAD at this time given patient has been in NSR. Follow up Endo recs for treatment of hyperthyroid. Reconsult EP PRN

## 2024-09-11 NOTE — CONSULT NOTE ADULT - SUBJECTIVE AND OBJECTIVE BOX
Neurology Consult    Reason for Consult: Patient is a 48y old  Male who presents with a chief complaint of Preop, Low TSH (11 Sep 2024 08:35)      HPI:  48 year old male with PMH of known aortic root dilatation / aortic dilatation increased to 5.6 cm from 4.8 in 2019 and new onset atrial fibrillation hospitalization on 7/2024 was on amiodarone and Eliquis (stopped as per Dr. Serrano as per pt, only on metoprolol), newly  diagnosed hyperthyroidism 7/2024/ Graves Disease not on meds ( asymptomatic/ saw endo 7/30/2024 pending work ups after heart surgery as per pt), high prolactin level 7/2024 followed by Neurology ( pending open air MRI 2/2 claustrophobia and further work up after open heart surgery as per pt), planned for Valve sparing Aortic valve replacement , replacement of Ascending aorta, Transverse Hemiarch on 9/10/2024 w/Dr. Serrano. Patient's outpatient preop labs reveals low TSH, Patient asked to present to the hospital 9/9/24 for direct admission for evaluation prior to the OR 9/10. (09 Sep 2024 21:49)       PAST MEDICAL & SURGICAL HISTORY:  Dilated aortic root      Marijuana use      History of prediabetes      Paroxysmal atrial fibrillation      Mild asthma      Hyperthyroidism      Thyroid nodule      Elevated prolactin level      Graves disease      H/O cardiomegaly      Enlarged thyroid gland      Thoracic aortic aneurysm      H/O wisdom tooth extraction          Allergies: Allergies    No Known Drug Allergies  shellfish (Anaphylaxis)    Intolerances        Social History: Denies toxic habits including tobacco, ETOH or illicit drugs.    Family History: FAMILY HISTORY:  Family history of myocardial infarction (Mother)    Family history of aortic dissection (Uncle)    . No family history of strokes    Medications: MEDICATIONS  (STANDING):  acetaminophen     Tablet .. 650 milliGRAM(s) Oral every 6 hours  aMIOdarone    Tablet   Oral   aMIOdarone    Tablet 400 milliGRAM(s) Oral every 8 hours  aMIOdarone Infusion 0.5 mG/Min (16.7 mL/Hr) IV Continuous <Continuous>  ascorbic acid 500 milliGRAM(s) Oral two times a day  aspirin enteric coated 81 milliGRAM(s) Oral daily  aspirin Suppository 300 milliGRAM(s) Rectal once  cefuroxime  IVPB 1500 milliGRAM(s) IV Intermittent every 8 hours  chlorhexidine 0.12% Liquid 15 milliLiter(s) Oral Mucosa every 12 hours  chlorhexidine 2% Cloths 1 Application(s) Topical daily  chlorhexidine 4% Liquid 1 Application(s) Topical daily  dexMEDEtomidine Infusion 1 MICROgram(s)/kG/Hr (29.3 mL/Hr) IV Continuous <Continuous>  dextrose 50% Injectable 25 milliLiter(s) IV Push every 15 minutes  dextrose 50% Injectable 50 milliLiter(s) IV Push every 15 minutes  DOBUTamine Infusion 4 MICROgram(s)/kG/Min (14.1 mL/Hr) IV Continuous <Continuous>  gabapentin 100 milliGRAM(s) Oral every 8 hours  heparin   Injectable 5000 Unit(s) SubCutaneous every 8 hours  insulin regular Infusion 3 Unit(s)/Hr (3 mL/Hr) IV Continuous <Continuous>  meperidine     Injectable 25 milliGRAM(s) IV Push once  mupirocin 2% Nasal 1 Application(s) Both Nostrils every 12 hours  niCARdipine Infusion 1 mG/Hr (5 mL/Hr) IV Continuous <Continuous>  norepinephrine Infusion 0.05 MICROgram(s)/kG/Min (11 mL/Hr) IV Continuous <Continuous>  polyethylene glycol 3350 17 Gram(s) Oral daily  potassium chloride  10 mEq/50 mL IVPB 10 milliEquivalent(s) IV Intermittent every 1 hour  potassium chloride  10 mEq/50 mL IVPB 10 milliEquivalent(s) IV Intermittent every 1 hour  potassium chloride  10 mEq/50 mL IVPB 10 milliEquivalent(s) IV Intermittent every 1 hour  senna 2 Tablet(s) Oral at bedtime  sodium chloride 0.9% lock flush 3 milliLiter(s) IV Push every 8 hours  sodium chloride 0.9%. 1000 milliLiter(s) (10 mL/Hr) IV Continuous <Continuous>    MEDICATIONS  (PRN):  HYDROmorphone  Injectable 0.5 milliGRAM(s) IV Push every 6 hours PRN Breakthrough Pain  oxyCODONE    IR 10 milliGRAM(s) Oral every 4 hours PRN Severe Pain (7 - 10)  oxyCODONE    IR 5 milliGRAM(s) Oral every 4 hours PRN Moderate Pain (4 - 6)      Review of Systems:  CONSTITUTIONAL:  No weight loss, fever, chills, weakness or fatigue.  HEENT:  Eyes:  No visual loss, blurred vision, double vision or yellow sclera. Ears, Nose, Throat:  No hearing loss, sneezing, congestion, runny nose or sore throat.  SKIN:  No rash or itching.  CARDIOVASCULAR:  No chest pain, chest pressure or chest discomfort. No palpitations or edema.  RESPIRATORY:  No shortness of breath, cough or sputum.  GASTROINTESTINAL:  No anorexia, nausea, vomiting or diarrhea. No abdominal pain or blood.  GENITOURINARY:  No burning on urination or incontinence   NEUROLOGICAL:  No headache, dizziness, syncope, paralysis, ataxia, numbness or tingling in the extremities. No change in bowel or bladder control. no limb weakness. no vision changes.   MUSCULOSKELETAL:  No muscle, back pain, joint pain or stiffness.  HEMATOLOGIC:  No anemia, bleeding or bruising.  LYMPHATICS:  No enlarged nodes. No history of splenectomy.  PSYCHIATRIC:  No history of depression or anxiety.  ENDOCRINOLOGIC:  No reports of sweating, cold or heat intolerance. No polyuria or polydipsia.      Vitals:  Vital Signs Last 24 Hrs  T(C): 35.9 (11 Sep 2024 08:00), Max: 37.1 (10 Sep 2024 18:00)  T(F): 96.7 (11 Sep 2024 08:00), Max: 98.7 (10 Sep 2024 18:00)  HR: 82 (11 Sep 2024 10:00) (73 - 84)  BP: 115/75 (11 Sep 2024 08:30) (115/75 - 128/82)  BP(mean): 90 (11 Sep 2024 08:30) (90 - 100)  RR: 15 (11 Sep 2024 10:00) (7 - 27)  SpO2: 100% (11 Sep 2024 10:00) (97% - 100%)    Parameters below as of 11 Sep 2024 10:00  Patient On (Oxygen Delivery Method): nasal cannula  O2 Flow (L/min): 3      General Exam:   General Appearance: Appropriately dressed and in no acute distress       Head: Normocephalic, atraumatic and no dysmorphic features  Ear, Nose, and Throat: Moist mucous membranes  CVS: S1S2+  Resp: No SOB, no wheeze or rhonchi  GI: soft NT/ND  Extremities: No edema or cyanosis  Skin: No bruises or rashes     Neurological Exam:  Mental Status: Awake, alert and oriented x 3.  Able to follow simple and complex verbal commands. Able to name and repeat. fluent speech. No obvious aphasia or dysarthria noted.   Cranial Nerves: PERRL, EOMI, VFFC, sensation V1-V3 intact,  no obvious facial asymmetry, equal elevation of palate, scm/trap 5/5, tongue is midline on protrusion. no obvious papilledema on fundoscopic exam. hearing is grossly intact.   Motor: Normal bulk, tone and strength throughout. Fine finger movements were intact and symmetric. no tremors or drift noted.    Sensation: Intact to light touch and pinprick throughout. no right/left confusion. no extinction to tactile on DSS.    Reflexes: 1+ throughout at biceps, brachioradialis, triceps, patellars and ankles bilaterally and equal. No clonus. R toe and L toe were both downgoing.  Coordination: No dysmetria on FNF or HKS  Gait:  walked on unit     Data/Labs/Imaging which I personally reviewed.     Labs:     CBC Full  -  ( 11 Sep 2024 00:24 )  WBC Count : 12.99 K/uL  RBC Count : 5.40 M/uL  Hemoglobin : 13.9 g/dL  Hematocrit : 42.1 %  Platelet Count - Automated : 109 K/uL  Mean Cell Volume : 78.0 fl  Mean Cell Hemoglobin : 25.7 pg  Mean Cell Hemoglobin Concentration : 33.0 gm/dL  Auto Neutrophil # : 11.94 K/uL  Auto Lymphocyte # : 0.53 K/uL  Auto Monocyte # : 0.45 K/uL  Auto Eosinophil # : 0.00 K/uL  Auto Basophil # : 0.02 K/uL  Auto Neutrophil % : 91.8 %  Auto Lymphocyte % : 4.1 %  Auto Monocyte % : 3.5 %  Auto Eosinophil % : 0.0 %  Auto Basophil % : 0.2 %    09-11    141  |  106  |  19  ----------------------------<  166<H>  4.5   |  20<L>  |  1.09    Ca    8.8      11 Sep 2024 00:24  Phos  3.8     09-11  Mg     2.5     09-11    TPro  6.1  /  Alb  3.6  /  TBili  3.8<H>  /  DBili  x   /  AST  84<H>  /  ALT  20  /  AlkPhos  75  09-11    LIVER FUNCTIONS - ( 11 Sep 2024 00:24 )  Alb: 3.6 g/dL / Pro: 6.1 g/dL / ALK PHOS: 75 U/L / ALT: 20 U/L / AST: 84 U/L / GGT: x           PT/INR - ( 11 Sep 2024 00:24 )   PT: 11.7 sec;   INR: 1.12 ratio         PTT - ( 11 Sep 2024 00:24 )  PTT:26.9 sec  Urinalysis Basic - ( 11 Sep 2024 00:24 )    Color: x / Appearance: x / SG: x / pH: x  Gluc: 166 mg/dL / Ketone: x  / Bili: x / Urobili: x   Blood: x / Protein: x / Nitrite: x   Leuk Esterase: x / RBC: x / WBC x   Sq Epi: x / Non Sq Epi: x / Bacteria: x

## 2024-09-11 NOTE — PHYSICAL THERAPY INITIAL EVALUATION ADULT - ADDITIONAL COMMENTS
Pt lives in private house with his fiance and 4 children, ~4 steps to enter and 1 flight of stairs to bedroom. Pt independently performs ADLs and ambulates without AD, no DME at home. Working Pt lives in private house with his fiance and 4 children, ~4 steps to enter and 1 flight of stairs to bedroom. Pt independently performs ADLs and ambulates without AD, no DME at home. Working in sanitation

## 2024-09-11 NOTE — PHYSICAL THERAPY INITIAL EVALUATION ADULT - GENERAL OBSERVATIONS, REHAB EVAL
Pt received in sitting in NAD, (+)ICU monitoring, tele, x2 CT to suction, external pacer, A line, IJ central line, NC, pulse ox, nowak, sternal dressings intact, A&Ox4, VS screened and stable.

## 2024-09-11 NOTE — CONSULT NOTE ADULT - ASSESSMENT
48 year old male with PMH of known aortic root dilatation / aortic dilatation increased to 5.6 cm from 4.8 in 2019 and new onset atrial fibrillation hospitalization on 7/2024 , newly  diagnosed subclinical hyperthyroidism 7/2024, now s/p for Valve sparing Aortic valve replacement , replacement of Ascending aorta, Transverse Hemiarch on 9/10/2024 w/Dr. Serrano.     Assessment  Hyperthyroidism: TSH suppressed, no thyroid dz history, Full TFT panel reviewed, Thyroid AB negative, TSI negative.    Free thyroxine came back normal 1.0 , subclinical. Not in thyrotoxic state,   Thyroid US was done last July with solid mass/nodule   < from: US Thyroid (07.16.24 @ 09:26) >  IMPRESSION:  Complex, hypervascular right cystic and solid mass with scattered   internal calcifications measuring 5.2 x 3.4 x 3.6 cm.  TI-RAD 5: Highly suspicious (FNA if > 1 cm, Follow if > 0.5 cm)      Aortic root dilation: on medications, CT surgery eval, monitored.  HTN: on antihypertensive medications, monitored, asymptomatic.      Discussed plan and management wit Dr Tammy Munoz MD  Cell: [phone]  Office: [phone] 48 year old male with PMH of known aortic root dilatation / aortic dilatation increased to 5.6 cm from 4.8 in 2019 and new onset atrial fibrillation hospitalization on 7/2024 , newly  diagnosed subclinical hyperthyroidism 7/2024, now s/p for Valve sparing Aortic valve replacement , replacement of Ascending aorta, Transverse Hemiarch on 9/10/2024 w/Dr. Serrano.       Assessment  Hyperthyroidism: TSH suppressed, no thyroid dz history, Full TFT panel reviewed, Thyroid AB negative, TSI negative.    Free thyroxine came back normal 1.0 , subclinical. Not in thyrotoxic state,   Thyroid US was done last July with solid mass/nodule   < from: US Thyroid (07.16.24 @ 09:26) >  IMPRESSION:  Complex, hypervascular right cystic and solid mass with scattered   internal calcifications measuring 5.2 x 3.4 x 3.6 cm.  TI-RAD 5: Highly suspicious (FNA if > 1 cm, Follow if > 0.5 cm)      Aortic root dilation: on medications, CT surgery eval, monitored.  HTN: on antihypertensive medications, monitored, asymptomatic.      Discussed plan and management wit Dr Tammy Munoz MD  Cell: [phone]  Office: [phone]

## 2024-09-11 NOTE — PHYSICAL THERAPY INITIAL EVALUATION ADULT - PERTINENT HX OF CURRENT PROBLEM, REHAB EVAL
Pt is a 48 year old male with PMH of known aortic root dilatation / aortic dilatation increased to 5.6 cm from 4.8 in 2019 and new onset atrial fibrillation hospitalization on 7/2024 was on amiodarone and Eliquis (stopped as per Dr. Serrano as per pt, only on metoprolol), newly  diagnosed hyperthyroidism 7/2024/ Graves Disease not on meds ( asymptomatic/ saw endo 7/30/2024 pending work ups after heart surgery as per pt), high prolactin level 7/2024 followed by Neurology ( pending open air MRI 2/2 claustrophobia and further work up after open heart surgery as per pt), planned for Valve sparing Aortic valve replacement , replacement of Ascending aorta, Transverse Hemiarch on 9/10/2024 w/Dr. Serrano. Patient's outpatient preop labs reveals low TSH, Patient asked to present to the hospital 9/9/24 for direct admission for evaluation prior to the OR 9/10.

## 2024-09-11 NOTE — CONSULT NOTE ADULT - PROVIDER SPECIALTY LIST ADULT
Problem: Activity Restriction  Goal: Patient will understand activity restrictions  Intervention: Activity assessment per Cardiac Rehab  Note: Intervention Status  Done  Intervention: Work simplification/energy conservation techniques  Note: Intervention Status  Done  Intervention: Surgical restrictions if applicable  Description: Surgical restrictions if applicable.  Note: Intervention Status  Done  Intervention: Return to driving (per physician order)  Note: Intervention Status  Done  Intervention: Sexual activity resumption discussion  Note: Intervention Status  Done  Intervention: Lifting restrictions  Description: Lifting restrictions.  Note: Intervention Status  Done     Problem: Cardiac Risk Factor  Goal: Patient will identify and understand personal cardiac risk factors  Intervention: Weight risk factor identification/risk reduction plan  Note: Intervention Status  Done  Intervention: Hypertension risk factor identification/risk reduction plan  Note: Intervention Status  Done  Intervention: Inactivity risk factor identification/risk reduction plan  Note: Intervention Status  Done  Intervention: Stress risk factor identification/risk reduction plan  Note: Intervention Status  Done  Intervention: Hyperlipidemia risk factor identification/risk reduction plan  Note: Intervention Status  Done     Problem: Discharge Information  Intervention: Home exercise program and self monitoring techniques  Note: Intervention Status  Done  Intervention: Home activity program  Note: Intervention Status  Done  Intervention: Sign and symptom recognition and plan  Note: Intervention Status  Done     
Neurology
Endocrinology

## 2024-09-11 NOTE — PROGRESS NOTE ADULT - SUBJECTIVE AND OBJECTIVE BOX
CRITICAL CARE ATTENDING - CTICU    MEDICATIONS  (STANDING):  acetaminophen     Tablet .. 650 milliGRAM(s) Oral every 6 hours  aMIOdarone    Tablet   Oral   aMIOdarone    Tablet 400 milliGRAM(s) Oral every 8 hours  aMIOdarone Infusion 0.5 mG/Min (16.7 mL/Hr) IV Continuous <Continuous>  ascorbic acid 500 milliGRAM(s) Oral two times a day  aspirin enteric coated 81 milliGRAM(s) Oral daily  aspirin Suppository 300 milliGRAM(s) Rectal once  cefuroxime  IVPB 1500 milliGRAM(s) IV Intermittent every 8 hours  chlorhexidine 0.12% Liquid 15 milliLiter(s) Oral Mucosa every 12 hours  chlorhexidine 2% Cloths 1 Application(s) Topical daily  chlorhexidine 4% Liquid 1 Application(s) Topical daily  dexMEDEtomidine Infusion 1 MICROgram(s)/kG/Hr (29.3 mL/Hr) IV Continuous <Continuous>  dextrose 50% Injectable 50 milliLiter(s) IV Push every 15 minutes  dextrose 50% Injectable 25 milliLiter(s) IV Push every 15 minutes  DOBUTamine Infusion 4 MICROgram(s)/kG/Min (14.1 mL/Hr) IV Continuous <Continuous>  furosemide   Injectable 20 milliGRAM(s) IV Push once  gabapentin 100 milliGRAM(s) Oral every 8 hours  heparin   Injectable 5000 Unit(s) SubCutaneous every 8 hours  insulin regular Infusion 3 Unit(s)/Hr (3 mL/Hr) IV Continuous <Continuous>  meperidine     Injectable 25 milliGRAM(s) IV Push once  mupirocin 2% Nasal 1 Application(s) Both Nostrils every 12 hours  niCARdipine Infusion 1 mG/Hr (5 mL/Hr) IV Continuous <Continuous>  norepinephrine Infusion 0.05 MICROgram(s)/kG/Min (11 mL/Hr) IV Continuous <Continuous>  polyethylene glycol 3350 17 Gram(s) Oral daily  potassium chloride  10 mEq/50 mL IVPB 10 milliEquivalent(s) IV Intermittent every 1 hour  potassium chloride  10 mEq/50 mL IVPB 10 milliEquivalent(s) IV Intermittent every 1 hour  potassium chloride  10 mEq/50 mL IVPB 10 milliEquivalent(s) IV Intermittent every 1 hour  senna 2 Tablet(s) Oral at bedtime  sodium chloride 0.9% lock flush 3 milliLiter(s) IV Push every 8 hours  sodium chloride 0.9%. 1000 milliLiter(s) (10 mL/Hr) IV Continuous <Continuous>                   13.9   12.99 )-----------( 109      ( 11 Sep 2024 00:24 )             42.1           141  |  106  |  19  ----------------------------<  166<H>  4.5   |  20<L>  |  1.09    Ca    8.8      11 Sep 2024 00:24  Phos  3.8       Mg     2.5         TPro  6.1  /  Alb  3.6  /  TBili  3.8<H>  /  DBili  x   /  AST  84<H>  /  ALT  20  /  AlkPhos  75        PT/INR - ( 11 Sep 2024 00:24 )   PT: 11.7 sec;   INR: 1.12 ratio         PTT - ( 11 Sep 2024 00:24 )  PTT:26.9 sec    Mode: Vent off  FiO2: 40      Daily     Daily Weight in k (11 Sep 2024 00:00)      09-10 @ : @ 07:00  --------------------------------------------------------  IN: 3169.1 mL / OUT: 1425 mL / NET: 1744.1 mL     @ 07: @ 08:35  --------------------------------------------------------  IN: 35.7 mL / OUT: 55 mL / NET: -19.3 mL        Critically Ill patient  : [ ] preoperative ,   [x post operative    Requires :  [x Arterial Line   [x Central Line  [ ] PA catheter  [ ] IABP  [ ] ECMO  [ ] LVAD  [ ] Ventilator  [ ] pacemaker [ ] Impella.                      [x ] ABG's     [ x] Pulse Oxymetry Monitoring  Bedside evaluation , monitoring , treatment of hemodynamics , fluids , IVP/ IVCD meds.        Diagnosis:   9/10 Valve sparing root AA replacement, Encompass ablation, NORIS ligation    S/p valve sparing root Hemiarch replacement     Acute post operative respiratory failure    Acute blood loss anemia     Post operative pain control     Lactic acidosis    Encounter for ventilator weaning    Chest tube drainage     Fluid overload     Temporary Pacemaker - VVI- 45    Hemodynamic Instability:   Vasopressors [] Inotropes [x]         IEduardo, personally performed the services described in this documentation. All medical record entries made by the scribe were at my direction and in my presence. I have reviewed the chart and agree that the record reflects my personal performance and is accurate and complete.   Eduardo Mercer MD.     By signing my name below, I, Genesis Mortensen, attest that this documentation has been prepared under the direction and in the presence of Eduardo Mercer MD.   Electronically Signed: Debbie Trejo 24 @ 08:35    Patient requires continuous monitoring with bedside rhythm monitoring, pulse oximetry monitoring, and continuous central venous and arterial pressure monitoring; and intermittent blood gas analysis. Care plan discussed with the ICU care team.   Patient remained critical, at risk for life threatening decompensation.    I have spent 30 minutes providing critical care management to this patient.      Discussed with CT surgeon, Physician Assistant - Nurse Practitioner- Critical care medicine team.   Discussed at  AM / PM rounds.   Chart, labs , films reviewed. CRITICAL CARE ATTENDING - CTICU    MEDICATIONS  (STANDING):  acetaminophen     Tablet .. 650 milliGRAM(s) Oral every 6 hours  aMIOdarone    Tablet   Oral   aMIOdarone    Tablet 400 milliGRAM(s) Oral every 8 hours  aMIOdarone Infusion 0.5 mG/Min (16.7 mL/Hr) IV Continuous <Continuous>  ascorbic acid 500 milliGRAM(s) Oral two times a day  aspirin enteric coated 81 milliGRAM(s) Oral daily  aspirin Suppository 300 milliGRAM(s) Rectal once  cefuroxime  IVPB 1500 milliGRAM(s) IV Intermittent every 8 hours  chlorhexidine 0.12% Liquid 15 milliLiter(s) Oral Mucosa every 12 hours  chlorhexidine 2% Cloths 1 Application(s) Topical daily  chlorhexidine 4% Liquid 1 Application(s) Topical daily  dexMEDEtomidine Infusion 1 MICROgram(s)/kG/Hr (29.3 mL/Hr) IV Continuous <Continuous>  dextrose 50% Injectable 50 milliLiter(s) IV Push every 15 minutes  dextrose 50% Injectable 25 milliLiter(s) IV Push every 15 minutes  DOBUTamine Infusion 4 MICROgram(s)/kG/Min (14.1 mL/Hr) IV Continuous <Continuous>  furosemide   Injectable 20 milliGRAM(s) IV Push once  gabapentin 100 milliGRAM(s) Oral every 8 hours  heparin   Injectable 5000 Unit(s) SubCutaneous every 8 hours  insulin regular Infusion 3 Unit(s)/Hr (3 mL/Hr) IV Continuous <Continuous>  meperidine     Injectable 25 milliGRAM(s) IV Push once  mupirocin 2% Nasal 1 Application(s) Both Nostrils every 12 hours  niCARdipine Infusion 1 mG/Hr (5 mL/Hr) IV Continuous <Continuous>  norepinephrine Infusion 0.05 MICROgram(s)/kG/Min (11 mL/Hr) IV Continuous <Continuous>  polyethylene glycol 3350 17 Gram(s) Oral daily  potassium chloride  10 mEq/50 mL IVPB 10 milliEquivalent(s) IV Intermittent every 1 hour  potassium chloride  10 mEq/50 mL IVPB 10 milliEquivalent(s) IV Intermittent every 1 hour  potassium chloride  10 mEq/50 mL IVPB 10 milliEquivalent(s) IV Intermittent every 1 hour  senna 2 Tablet(s) Oral at bedtime  sodium chloride 0.9% lock flush 3 milliLiter(s) IV Push every 8 hours  sodium chloride 0.9%. 1000 milliLiter(s) (10 mL/Hr) IV Continuous <Continuous>                   13.9   12.99 )-----------( 109      ( 11 Sep 2024 00:24 )             42.1           141  |  106  |  19  ----------------------------<  166<H>  4.5   |  20<L>  |  1.09    Ca    8.8      11 Sep 2024 00:24  Phos  3.8       Mg     2.5         TPro  6.1  /  Alb  3.6  /  TBili  3.8<H>  /  DBili  x   /  AST  84<H>  /  ALT  20  /  AlkPhos  75        PT/INR - ( 11 Sep 2024 00:24 )   PT: 11.7 sec;   INR: 1.12 ratio         PTT - ( 11 Sep 2024 00:24 )  PTT:26.9 sec    Mode: Vent off  FiO2: 40      Daily     Daily Weight in k (11 Sep 2024 00:00)      09-10 @ : @ 07:00  --------------------------------------------------------  IN: 3169.1 mL / OUT: 1425 mL / NET: 1744.1 mL     @ 07: @ 08:35  --------------------------------------------------------  IN: 35.7 mL / OUT: 55 mL / NET: -19.3 mL        Critically Ill patient  : [ ] preoperative ,   [x] post operative    Requires :  [x Arterial Line   [x Central Line  [ ] PA catheter  [ ] IABP  [ ] ECMO  [ ] LVAD  [ ] Ventilator  [ xpacemaker - TPM [ ] Impella.                      [x ] ABG's     [ x] Pulse Oxymetry Monitoring  Bedside evaluation , monitoring , treatment of hemodynamics , fluids , IVP/ IVCD meds.        Diagnosis:     9/10 Valve sparing root AA replacement, Encompass ablation, NORIS ligation    CHF- acute [ x]   chronic [ ]    systolic [x ]   diastolic [ ]  Valvular [ ]          - Echo- EF -             [x ] RV dysfunction          - Cxr-cardiomegally, edema          - Clinical-  [xx ]inotropes   [ ]pressors   [x ]diuresis   [ ]IABP   [ ]ECMO   [ ]LVAD   [ x] Respiratory insuffien        -     Aortic Aneurysm    A Fib.     S/p valve sparing root Hemiarch replacement     Respiratory insuffiencey     Hypoxemia - Requires [ x] NC    Supplemental O2    Post operative pain control     Metabolic Acidosisis    Lactic acidosis    Chest Tube Drainage / Management     Fluid overload     Thrombocytopenia     Temporary pacemaker (TPM) interrogation and setting.     Hemodynamic lability,  instability. Requires IVCD [ x] vasopressors [ x] inotropes  [ ] vasodilator  [x ]IVSS fluid  to maintain MAP, perfusion, C.I.          IEduardo, personally performed the services described in this documentation. All medical record entries made by the scribe were at my direction and in my presence. I have reviewed the chart and agree that the record reflects my personal performance and is accurate and complete.   Eduardo Mercer MD.     By signing my name below, I, Genesis Mortensen, attest that this documentation has been prepared under the direction and in the presence of Eduardo Mercer MD.   Electronically Signed: Debbie Trejo 24 @ 08:35    Patient requires continuous monitoring with bedside rhythm monitoring, pulse oximetry monitoring, and continuous central venous and arterial pressure monitoring; and intermittent blood gas analysis. Care plan discussed with the ICU care team.   Patient remained critical, at risk for life threatening decompensation.    I have spent 105 min minutes providing critical care management to this patient.      Discussed with CT surgeon, Physician Assistant - Nurse Practitioner- Critical care medicine team.   Discussed at  AM / PM rounds.   Chart, labs , films reviewed.

## 2024-09-11 NOTE — CONSULT NOTE ADULT - SUBJECTIVE AND OBJECTIVE BOX
HPI:  48 year old male with PMH of known aortic root dilatation / aortic dilatation increased to 5.6 cm from 4.8 in 2019 and new onset atrial fibrillation hospitalization on 7/2024 was on amiodarone and Eliquis (stopped as per Dr. Serrano as per pt, only on metoprolol), newly  diagnosed hyperthyroidism 7/2024/ Graves Disease not on meds ( asymptomatic/ saw endo 7/30/2024 pending work ups after heart surgery as per pt), high prolactin level 7/2024 followed by Neurology ( pending open air MRI 2/2 claustrophobia and further work up after open heart surgery as per pt), planned for Valve sparing Aortic valve replacement , replacement of Ascending aorta, Transverse Hemiarch on 9/10/2024 w/Dr. Serrano. Patient's outpatient preop labs reveals low TSH, Patient asked to present to the hospital 9/9/24 for direct admission for evaluation prior to the OR 9/10. (09 Sep 2024 21:49)    Endo was consulted for hyperthyroid     PAST MEDICAL & SURGICAL HISTORY:  Dilated aortic root      Marijuana use      History of prediabetes      Paroxysmal atrial fibrillation      Mild asthma      Hyperthyroidism      Thyroid nodule      Elevated prolactin level      Graves disease      H/O cardiomegaly      Enlarged thyroid gland      Thoracic aortic aneurysm      H/O wisdom tooth extraction          FAMILY HISTORY:  Family history of myocardial infarction (Mother)    Family history of aortic dissection (Uncle)        Social History:  Chemo/Rad to chest:  Implantable Devices:  ADLs:  Residence: (09 Sep 2024 21:49)            HOME MEDICATIONS:  Home Medications:  metoprolol succinate 50 mg oral tablet, extended release: 1 tab(s) orally once a day (at bedtime) (06 Sep 2024 14:29)            MEDICATIONS  (STANDING):  acetaminophen     Tablet .. 650 milliGRAM(s) Oral every 6 hours  ascorbic acid 500 milliGRAM(s) Oral two times a day  aspirin enteric coated 81 milliGRAM(s) Oral daily  aspirin Suppository 300 milliGRAM(s) Rectal once  cefuroxime  IVPB 1500 milliGRAM(s) IV Intermittent every 8 hours  chlorhexidine 0.12% Liquid 15 milliLiter(s) Oral Mucosa every 12 hours  chlorhexidine 2% Cloths 1 Application(s) Topical daily  chlorhexidine 4% Liquid 1 Application(s) Topical daily  dexMEDEtomidine Infusion 1 MICROgram(s)/kG/Hr (29.3 mL/Hr) IV Continuous <Continuous>  dextrose 50% Injectable 50 milliLiter(s) IV Push every 15 minutes  dextrose 50% Injectable 25 milliLiter(s) IV Push every 15 minutes  DOBUTamine Infusion 4 MICROgram(s)/kG/Min (14.1 mL/Hr) IV Continuous <Continuous>  gabapentin 100 milliGRAM(s) Oral every 8 hours  heparin   Injectable 5000 Unit(s) SubCutaneous every 8 hours  insulin regular Infusion 3 Unit(s)/Hr (3 mL/Hr) IV Continuous <Continuous>  meperidine     Injectable 25 milliGRAM(s) IV Push once  mupirocin 2% Nasal 1 Application(s) Both Nostrils every 12 hours  niCARdipine Infusion 1 mG/Hr (5 mL/Hr) IV Continuous <Continuous>  norepinephrine Infusion 0.05 MICROgram(s)/kG/Min (11 mL/Hr) IV Continuous <Continuous>  polyethylene glycol 3350 17 Gram(s) Oral daily  potassium chloride  10 mEq/50 mL IVPB 10 milliEquivalent(s) IV Intermittent every 1 hour  potassium chloride  10 mEq/50 mL IVPB 10 milliEquivalent(s) IV Intermittent every 1 hour  potassium chloride  10 mEq/50 mL IVPB 10 milliEquivalent(s) IV Intermittent every 1 hour  senna 2 Tablet(s) Oral at bedtime  sodium chloride 0.9% lock flush 3 milliLiter(s) IV Push every 8 hours  sodium chloride 0.9%. 1000 milliLiter(s) (10 mL/Hr) IV Continuous <Continuous>    MEDICATIONS  (PRN):  HYDROmorphone  Injectable 0.5 milliGRAM(s) IV Push every 6 hours PRN Breakthrough Pain  oxyCODONE    IR 10 milliGRAM(s) Oral every 4 hours PRN Severe Pain (7 - 10)  oxyCODONE    IR 5 milliGRAM(s) Oral every 4 hours PRN Moderate Pain (4 - 6)      Allergies    No Known Drug Allergies  shellfish (Anaphylaxis)    Intolerances        Review of Systems:  Neuro: No HA, no dizziness  Cardiovascular: No chest pain, no palpitations  Respiratory: no SOB, no cough  GI: No nausea, vomiting, abdominal pain  MSK: Denies joint/muscle pain      ALL OTHER SYSTEMS REVIEWED AND NEGATIVE      PHYSICAL EXAM:  VITALS: T(C): 36.7 (09-11-24 @ 12:00)  T(F): 98.1 (09-11-24 @ 12:00), Max: 98.7 (09-10-24 @ 18:00)  HR: 87 (09-11-24 @ 14:15) (73 - 91)  BP: 143/85 (09-11-24 @ 14:15) (115/75 - 143/85)  RR:  (3 - 31)  SpO2:  (97% - 100%)  Wt(kg): --  GENERAL: NAD, well-groomed, well-developed  NEURO:  alert and oriented  RESPIRATORY: Clear to auscultation bilaterally; No rales, rhonchi, wheezing  CARDIOVASCULAR: Si S2  GI: Soft, non distended, normal bowel sounds  MUSCULOSKELETAL: Moves all extremities equally       POCT Blood Glucose.: 109 mg/dL (09-11-24 @ 14:13)  POCT Blood Glucose.: 106 mg/dL (09-11-24 @ 13:33)  POCT Blood Glucose.: 117 mg/dL (09-11-24 @ 12:25)  POCT Blood Glucose.: 125 mg/dL (09-11-24 @ 11:16)  POCT Blood Glucose.: 125 mg/dL (09-11-24 @ 10:04)  POCT Blood Glucose.: 132 mg/dL (09-11-24 @ 09:03)  POCT Blood Glucose.: 151 mg/dL (09-11-24 @ 07:55)  POCT Blood Glucose.: 149 mg/dL (09-11-24 @ 06:42)  POCT Blood Glucose.: 134 mg/dL (09-11-24 @ 05:59)  POCT Blood Glucose.: 193 mg/dL (09-11-24 @ 04:55)  POCT Blood Glucose.: 131 mg/dL (09-11-24 @ 04:03)  POCT Blood Glucose.: 131 mg/dL (09-11-24 @ 02:50)  POCT Blood Glucose.: 129 mg/dL (09-11-24 @ 01:48)  POCT Blood Glucose.: 117 mg/dL (09-11-24 @ 01:07)  POCT Blood Glucose.: 150 mg/dL (09-11-24 @ 00:02)  POCT Blood Glucose.: 158 mg/dL (09-10-24 @ 22:55)  POCT Blood Glucose.: 176 mg/dL (09-10-24 @ 22:04)  POCT Blood Glucose.: 212 mg/dL (09-10-24 @ 21:00)  POCT Blood Glucose.: 196 mg/dL (09-10-24 @ 20:06)                            13.9   12.99 )-----------( 109      ( 11 Sep 2024 00:24 )             42.1       09-11    132<L>  |  99  |  18  ----------------------------<  111<H>  4.3   |  22  |  0.74    eGFR: 113    Ca    8.7      09-11  Mg     2.1     09-11  Phos  3.4     09-11    TPro  6.1  /  Alb  3.6  /  TBili  3.8<H>  /  DBili  x   /  AST  84<H>  /  ALT  20  /  AlkPhos  75  09-11      Thyroid Function Tests:  09-09 @ 22:26 TSH <0.01 FreeT4 1.0 T3 139 Anti TPO -- Anti Thyroglobulin Ab -- TSI --  09-06 @ 15:07 TSH <0.01 FreeT4 -- T3 -- Anti TPO -- Anti Thyroglobulin Ab -- TSI --    Diet, Regular:   Consistent Carbohydrate No Snacks (CSTCHO) (09-11-24 @ 03:02) [Active]  Diet, Clear Liquid:   Consistent Carbohydrate No Snacks (CSTCHO) (09-10-24 @ 06:17) [Available for Activation]  Diet, NPO:   Except Medications  With Ice Chips/Sips of Water     Special Instructions for Nursing:  Except Medications, With Ice Chips/Sips of Water (09-06-24 @ 15:13) [Active]          A1C with Estimated Average Glucose Result: 6.2 % (09-06-24 @ 15:07)  A1C with Estimated Average Glucose Result: 6.4 % (07-14-24 @ 06:35)     HPI:  48 year old male with PMH of known aortic root dilatation / aortic dilatation increased to 5.6 cm from 4.8 in 2019 and new onset atrial fibrillation hospitalization on 7/2024 was on amiodarone and Eliquis (stopped as per Dr. Serrano as per pt, only on metoprolol), newly  diagnosed hyperthyroidism 7/2024/ Graves Disease not on meds ( asymptomatic/ saw endo 7/30/2024 pending work ups after heart surgery as per pt), high prolactin level 7/2024 followed by Neurology ( pending open air MRI 2/2 claustrophobia and further work up after open heart surgery as per pt), planned for Valve sparing Aortic valve replacement , replacement of Ascending aorta, Transverse Hemiarch on 9/10/2024 w/Dr. Serrano. Patient's outpatient preop labs reveals low TSH, Patient asked to present to the hospital 9/9/24 for direct admission for evaluation prior to the OR 9/10. (09 Sep 2024 21:49)      Endo was consulted for hyperthyroid     PAST MEDICAL & SURGICAL HISTORY:  Dilated aortic root      Marijuana use      History of prediabetes      Paroxysmal atrial fibrillation      Mild asthma      Hyperthyroidism      Thyroid nodule      Elevated prolactin level      Graves disease      H/O cardiomegaly      Enlarged thyroid gland      Thoracic aortic aneurysm      H/O wisdom tooth extraction          FAMILY HISTORY:  Family history of myocardial infarction (Mother)    Family history of aortic dissection (Uncle)        Social History:  Chemo/Rad to chest:  Implantable Devices:  ADLs:  Residence: (09 Sep 2024 21:49)            HOME MEDICATIONS:  Home Medications:  metoprolol succinate 50 mg oral tablet, extended release: 1 tab(s) orally once a day (at bedtime) (06 Sep 2024 14:29)            MEDICATIONS  (STANDING):  acetaminophen     Tablet .. 650 milliGRAM(s) Oral every 6 hours  ascorbic acid 500 milliGRAM(s) Oral two times a day  aspirin enteric coated 81 milliGRAM(s) Oral daily  aspirin Suppository 300 milliGRAM(s) Rectal once  cefuroxime  IVPB 1500 milliGRAM(s) IV Intermittent every 8 hours  chlorhexidine 0.12% Liquid 15 milliLiter(s) Oral Mucosa every 12 hours  chlorhexidine 2% Cloths 1 Application(s) Topical daily  chlorhexidine 4% Liquid 1 Application(s) Topical daily  dexMEDEtomidine Infusion 1 MICROgram(s)/kG/Hr (29.3 mL/Hr) IV Continuous <Continuous>  dextrose 50% Injectable 50 milliLiter(s) IV Push every 15 minutes  dextrose 50% Injectable 25 milliLiter(s) IV Push every 15 minutes  DOBUTamine Infusion 4 MICROgram(s)/kG/Min (14.1 mL/Hr) IV Continuous <Continuous>  gabapentin 100 milliGRAM(s) Oral every 8 hours  heparin   Injectable 5000 Unit(s) SubCutaneous every 8 hours  insulin regular Infusion 3 Unit(s)/Hr (3 mL/Hr) IV Continuous <Continuous>  meperidine     Injectable 25 milliGRAM(s) IV Push once  mupirocin 2% Nasal 1 Application(s) Both Nostrils every 12 hours  niCARdipine Infusion 1 mG/Hr (5 mL/Hr) IV Continuous <Continuous>  norepinephrine Infusion 0.05 MICROgram(s)/kG/Min (11 mL/Hr) IV Continuous <Continuous>  polyethylene glycol 3350 17 Gram(s) Oral daily  potassium chloride  10 mEq/50 mL IVPB 10 milliEquivalent(s) IV Intermittent every 1 hour  potassium chloride  10 mEq/50 mL IVPB 10 milliEquivalent(s) IV Intermittent every 1 hour  potassium chloride  10 mEq/50 mL IVPB 10 milliEquivalent(s) IV Intermittent every 1 hour  senna 2 Tablet(s) Oral at bedtime  sodium chloride 0.9% lock flush 3 milliLiter(s) IV Push every 8 hours  sodium chloride 0.9%. 1000 milliLiter(s) (10 mL/Hr) IV Continuous <Continuous>    MEDICATIONS  (PRN):  HYDROmorphone  Injectable 0.5 milliGRAM(s) IV Push every 6 hours PRN Breakthrough Pain  oxyCODONE    IR 10 milliGRAM(s) Oral every 4 hours PRN Severe Pain (7 - 10)  oxyCODONE    IR 5 milliGRAM(s) Oral every 4 hours PRN Moderate Pain (4 - 6)      Allergies    No Known Drug Allergies  shellfish (Anaphylaxis)    Intolerances        Review of Systems:  Neuro: No HA, no dizziness  Cardiovascular: No chest pain, no palpitations  Respiratory: no SOB, no cough  GI: No nausea, vomiting, abdominal pain  MSK: Denies joint/muscle pain      ALL OTHER SYSTEMS REVIEWED AND NEGATIVE      PHYSICAL EXAM:  VITALS: T(C): 36.7 (09-11-24 @ 12:00)  T(F): 98.1 (09-11-24 @ 12:00), Max: 98.7 (09-10-24 @ 18:00)  HR: 87 (09-11-24 @ 14:15) (73 - 91)  BP: 143/85 (09-11-24 @ 14:15) (115/75 - 143/85)  RR:  (3 - 31)  SpO2:  (97% - 100%)  Wt(kg): --  GENERAL: NAD, well-groomed, well-developed  NEURO:  alert and oriented  RESPIRATORY: Clear to auscultation bilaterally; No rales, rhonchi, wheezing  CARDIOVASCULAR: Si S2  GI: Soft, non distended, normal bowel sounds  MUSCULOSKELETAL: Moves all extremities equally       POCT Blood Glucose.: 109 mg/dL (09-11-24 @ 14:13)  POCT Blood Glucose.: 106 mg/dL (09-11-24 @ 13:33)  POCT Blood Glucose.: 117 mg/dL (09-11-24 @ 12:25)  POCT Blood Glucose.: 125 mg/dL (09-11-24 @ 11:16)  POCT Blood Glucose.: 125 mg/dL (09-11-24 @ 10:04)  POCT Blood Glucose.: 132 mg/dL (09-11-24 @ 09:03)  POCT Blood Glucose.: 151 mg/dL (09-11-24 @ 07:55)  POCT Blood Glucose.: 149 mg/dL (09-11-24 @ 06:42)  POCT Blood Glucose.: 134 mg/dL (09-11-24 @ 05:59)  POCT Blood Glucose.: 193 mg/dL (09-11-24 @ 04:55)  POCT Blood Glucose.: 131 mg/dL (09-11-24 @ 04:03)  POCT Blood Glucose.: 131 mg/dL (09-11-24 @ 02:50)  POCT Blood Glucose.: 129 mg/dL (09-11-24 @ 01:48)  POCT Blood Glucose.: 117 mg/dL (09-11-24 @ 01:07)  POCT Blood Glucose.: 150 mg/dL (09-11-24 @ 00:02)  POCT Blood Glucose.: 158 mg/dL (09-10-24 @ 22:55)  POCT Blood Glucose.: 176 mg/dL (09-10-24 @ 22:04)  POCT Blood Glucose.: 212 mg/dL (09-10-24 @ 21:00)  POCT Blood Glucose.: 196 mg/dL (09-10-24 @ 20:06)                            13.9   12.99 )-----------( 109      ( 11 Sep 2024 00:24 )             42.1       09-11    132<L>  |  99  |  18  ----------------------------<  111<H>  4.3   |  22  |  0.74    eGFR: 113    Ca    8.7      09-11  Mg     2.1     09-11  Phos  3.4     09-11    TPro  6.1  /  Alb  3.6  /  TBili  3.8<H>  /  DBili  x   /  AST  84<H>  /  ALT  20  /  AlkPhos  75  09-11      Thyroid Function Tests:  09-09 @ 22:26 TSH <0.01 FreeT4 1.0 T3 139 Anti TPO -- Anti Thyroglobulin Ab -- TSI --  09-06 @ 15:07 TSH <0.01 FreeT4 -- T3 -- Anti TPO -- Anti Thyroglobulin Ab -- TSI --    Diet, Regular:   Consistent Carbohydrate No Snacks (CSTCHO) (09-11-24 @ 03:02) [Active]  Diet, Clear Liquid:   Consistent Carbohydrate No Snacks (CSTCHO) (09-10-24 @ 06:17) [Available for Activation]  Diet, NPO:   Except Medications  With Ice Chips/Sips of Water     Special Instructions for Nursing:  Except Medications, With Ice Chips/Sips of Water (09-06-24 @ 15:13) [Active]          A1C with Estimated Average Glucose Result: 6.2 % (09-06-24 @ 15:07)  A1C with Estimated Average Glucose Result: 6.4 % (07-14-24 @ 06:35)

## 2024-09-12 LAB
ALBUMIN SERPL ELPH-MCNC: 3.7 G/DL — SIGNIFICANT CHANGE UP (ref 3.3–5)
ALP SERPL-CCNC: 66 U/L — SIGNIFICANT CHANGE UP (ref 40–120)
ALT FLD-CCNC: 38 U/L — SIGNIFICANT CHANGE UP (ref 10–45)
ANION GAP SERPL CALC-SCNC: 11 MMOL/L — SIGNIFICANT CHANGE UP (ref 5–17)
AST SERPL-CCNC: 101 U/L — HIGH (ref 10–40)
BASOPHILS # BLD AUTO: 0.03 K/UL — SIGNIFICANT CHANGE UP (ref 0–0.2)
BASOPHILS NFR BLD AUTO: 0.1 % — SIGNIFICANT CHANGE UP (ref 0–2)
BILIRUB SERPL-MCNC: 4.4 MG/DL — HIGH (ref 0.2–1.2)
BUN SERPL-MCNC: 22 MG/DL — SIGNIFICANT CHANGE UP (ref 7–23)
CALCIUM SERPL-MCNC: 8.7 MG/DL — SIGNIFICANT CHANGE UP (ref 8.4–10.5)
CHLORIDE SERPL-SCNC: 98 MMOL/L — SIGNIFICANT CHANGE UP (ref 96–108)
CO2 SERPL-SCNC: 23 MMOL/L — SIGNIFICANT CHANGE UP (ref 22–31)
CREAT SERPL-MCNC: 0.81 MG/DL — SIGNIFICANT CHANGE UP (ref 0.5–1.3)
EGFR: 110 ML/MIN/1.73M2 — SIGNIFICANT CHANGE UP
EOSINOPHIL # BLD AUTO: 0 K/UL — SIGNIFICANT CHANGE UP (ref 0–0.5)
EOSINOPHIL NFR BLD AUTO: 0 % — SIGNIFICANT CHANGE UP (ref 0–6)
GAS PNL BLDA: SIGNIFICANT CHANGE UP
GAS PNL BLDV: SIGNIFICANT CHANGE UP
GLUCOSE BLDC GLUCOMTR-MCNC: 131 MG/DL — HIGH (ref 70–99)
GLUCOSE BLDC GLUCOMTR-MCNC: 154 MG/DL — HIGH (ref 70–99)
GLUCOSE BLDC GLUCOMTR-MCNC: 156 MG/DL — HIGH (ref 70–99)
GLUCOSE BLDC GLUCOMTR-MCNC: 170 MG/DL — HIGH (ref 70–99)
GLUCOSE BLDC GLUCOMTR-MCNC: 170 MG/DL — HIGH (ref 70–99)
GLUCOSE BLDC GLUCOMTR-MCNC: 179 MG/DL — HIGH (ref 70–99)
GLUCOSE SERPL-MCNC: 135 MG/DL — HIGH (ref 70–99)
HCT VFR BLD CALC: 35 % — LOW (ref 39–50)
HGB BLD-MCNC: 12 G/DL — LOW (ref 13–17)
IMM GRANULOCYTES NFR BLD AUTO: 0.8 % — SIGNIFICANT CHANGE UP (ref 0–0.9)
LYMPHOCYTES # BLD AUTO: 1.71 K/UL — SIGNIFICANT CHANGE UP (ref 1–3.3)
LYMPHOCYTES # BLD AUTO: 7 % — LOW (ref 13–44)
MAGNESIUM SERPL-MCNC: 2.3 MG/DL — SIGNIFICANT CHANGE UP (ref 1.6–2.6)
MCHC RBC-ENTMCNC: 26 PG — LOW (ref 27–34)
MCHC RBC-ENTMCNC: 34.3 GM/DL — SIGNIFICANT CHANGE UP (ref 32–36)
MCV RBC AUTO: 75.8 FL — LOW (ref 80–100)
MONOCYTES # BLD AUTO: 2.28 K/UL — HIGH (ref 0–0.9)
MONOCYTES NFR BLD AUTO: 9.3 % — SIGNIFICANT CHANGE UP (ref 2–14)
NEUTROPHILS # BLD AUTO: 20.29 K/UL — HIGH (ref 1.8–7.4)
NEUTROPHILS NFR BLD AUTO: 82.8 % — HIGH (ref 43–77)
NRBC # BLD: 0 /100 WBCS — SIGNIFICANT CHANGE UP (ref 0–0)
PHOSPHATE SERPL-MCNC: 3.7 MG/DL — SIGNIFICANT CHANGE UP (ref 2.5–4.5)
PLATELET # BLD AUTO: 93 K/UL — LOW (ref 150–400)
POTASSIUM SERPL-MCNC: 4.9 MMOL/L — SIGNIFICANT CHANGE UP (ref 3.5–5.3)
POTASSIUM SERPL-SCNC: 4.9 MMOL/L — SIGNIFICANT CHANGE UP (ref 3.5–5.3)
PROLACTIN SERPL-MCNC: 35.4 NG/ML — HIGH (ref 4.1–18.4)
PROT SERPL-MCNC: 6.2 G/DL — SIGNIFICANT CHANGE UP (ref 6–8.3)
RBC # BLD: 4.62 M/UL — SIGNIFICANT CHANGE UP (ref 4.2–5.8)
RBC # FLD: 15.5 % — HIGH (ref 10.3–14.5)
SODIUM SERPL-SCNC: 132 MMOL/L — LOW (ref 135–145)
WBC # BLD: 24.51 K/UL — HIGH (ref 3.8–10.5)
WBC # FLD AUTO: 24.51 K/UL — HIGH (ref 3.8–10.5)

## 2024-09-12 PROCEDURE — 99291 CRITICAL CARE FIRST HOUR: CPT

## 2024-09-12 PROCEDURE — 71045 X-RAY EXAM CHEST 1 VIEW: CPT | Mod: 26

## 2024-09-12 PROCEDURE — 93010 ELECTROCARDIOGRAM REPORT: CPT

## 2024-09-12 RX ORDER — FUROSEMIDE 40 MG
20 TABLET ORAL ONCE
Refills: 0 | Status: COMPLETED | OUTPATIENT
Start: 2024-09-12 | End: 2024-09-12

## 2024-09-12 RX ORDER — ACETAMINOPHEN 325 MG/1
1000 TABLET ORAL ONCE
Refills: 0 | Status: COMPLETED | OUTPATIENT
Start: 2024-09-12 | End: 2024-09-12

## 2024-09-12 RX ORDER — METOCLOPRAMIDE HCL 5 MG
5 TABLET ORAL DAILY
Refills: 0 | Status: DISCONTINUED | OUTPATIENT
Start: 2024-09-12 | End: 2024-09-15

## 2024-09-12 RX ADMIN — SODIUM CHLORIDE 3 MILLILITER(S): 9 INJECTION INTRAMUSCULAR; INTRAVENOUS; SUBCUTANEOUS at 21:06

## 2024-09-12 RX ADMIN — OXYCODONE HYDROCHLORIDE 10 MILLIGRAM(S): 5 TABLET ORAL at 18:45

## 2024-09-12 RX ADMIN — Medication 81 MILLIGRAM(S): at 12:55

## 2024-09-12 RX ADMIN — Medication 100 MILLIGRAM(S): at 13:47

## 2024-09-12 RX ADMIN — Medication 1 APPLICATION(S): at 17:45

## 2024-09-12 RX ADMIN — Medication 2: at 13:56

## 2024-09-12 RX ADMIN — ACETAMINOPHEN 650 MILLIGRAM(S): 325 TABLET ORAL at 21:51

## 2024-09-12 RX ADMIN — ACETAMINOPHEN 650 MILLIGRAM(S): 325 TABLET ORAL at 13:15

## 2024-09-12 RX ADMIN — Medication 5000 UNIT(S): at 05:25

## 2024-09-12 RX ADMIN — ACETAMINOPHEN 400 MILLIGRAM(S): 325 TABLET ORAL at 04:00

## 2024-09-12 RX ADMIN — Medication 100 MILLIGRAM(S): at 21:51

## 2024-09-12 RX ADMIN — SODIUM CHLORIDE 3 MILLILITER(S): 9 INJECTION INTRAMUSCULAR; INTRAVENOUS; SUBCUTANEOUS at 05:35

## 2024-09-12 RX ADMIN — SODIUM CHLORIDE 3 MILLILITER(S): 9 INJECTION INTRAMUSCULAR; INTRAVENOUS; SUBCUTANEOUS at 13:44

## 2024-09-12 RX ADMIN — Medication 5 MILLIGRAM(S): at 12:55

## 2024-09-12 RX ADMIN — Medication 500 MILLIGRAM(S): at 17:45

## 2024-09-12 RX ADMIN — ACETAMINOPHEN 1000 MILLIGRAM(S): 325 TABLET ORAL at 04:30

## 2024-09-12 RX ADMIN — Medication 100 MILLIGRAM(S): at 05:25

## 2024-09-12 RX ADMIN — Medication 500 MILLIGRAM(S): at 05:24

## 2024-09-12 RX ADMIN — ACETAMINOPHEN 650 MILLIGRAM(S): 325 TABLET ORAL at 17:46

## 2024-09-12 RX ADMIN — ACETAMINOPHEN 650 MILLIGRAM(S): 325 TABLET ORAL at 22:20

## 2024-09-12 RX ADMIN — Medication 5000 UNIT(S): at 13:17

## 2024-09-12 RX ADMIN — ACETAMINOPHEN 650 MILLIGRAM(S): 325 TABLET ORAL at 12:54

## 2024-09-12 RX ADMIN — HYDROMORPHONE HYDROCHLORIDE 0.5 MILLIGRAM(S): 2 TABLET ORAL at 03:15

## 2024-09-12 RX ADMIN — Medication 1 APPLICATION(S): at 05:24

## 2024-09-12 RX ADMIN — OXYCODONE HYDROCHLORIDE 10 MILLIGRAM(S): 5 TABLET ORAL at 09:00

## 2024-09-12 RX ADMIN — Medication 2: at 08:30

## 2024-09-12 RX ADMIN — Medication 2 TABLET(S): at 21:52

## 2024-09-12 RX ADMIN — Medication 20 MILLIGRAM(S): at 12:55

## 2024-09-12 RX ADMIN — OXYCODONE HYDROCHLORIDE 10 MILLIGRAM(S): 5 TABLET ORAL at 08:34

## 2024-09-12 RX ADMIN — Medication 100 MILLIGRAM(S): at 05:24

## 2024-09-12 RX ADMIN — OXYCODONE HYDROCHLORIDE 10 MILLIGRAM(S): 5 TABLET ORAL at 17:45

## 2024-09-12 RX ADMIN — Medication 5000 UNIT(S): at 21:52

## 2024-09-12 RX ADMIN — HYDROMORPHONE HYDROCHLORIDE 0.5 MILLIGRAM(S): 2 TABLET ORAL at 03:00

## 2024-09-12 RX ADMIN — POLYETHYLENE GLYCOL 3350 17 GRAM(S): 17 POWDER, FOR SOLUTION ORAL at 12:56

## 2024-09-12 NOTE — PROGRESS NOTE ADULT - ASSESSMENT
48 year old male with PMH of known aortic root dilatation / aortic dilatation increased to 5.6 cm from 4.8 in 2019 and new onset atrial fibrillation hospitalization on 7/2024 , newly  diagnosed subclinical hyperthyroidism 7/2024, now s/p for Valve sparing Aortic valve replacement , replacement of Ascending aorta, Transverse Hemiarch on 9/10/2024 w/Dr. Serrano.     Assessment  Hyperthyroidism: TSH suppressed, no thyroid dz history, Full TFT panel reviewed, Thyroid AB negative, TSI negative.    Free thyroxine came back normal 1.0-1.4 , still  subclinical. Not in thyrotoxic state.   Thyroid US was done last July with solid mass/nodule   < from: US Thyroid (07.16.24 @ 09:26) >  IMPRESSION:  Complex, hypervascular right cystic and solid mass with scattered   internal calcifications measuring 5.2 x 3.4 x 3.6 cm.  TI-RAD 5: Highly suspicious (FNA if > 1 cm, Follow if > 0.5 cm)    Aortic root dilation: on medications, CT surgery eval, monitored.  HTN: on antihypertensive medications, monitored, asymptomatic.      Discussed plan and management wit Dr Tammy Munoz MD  Cell: [phone]  Office: [phone] 48 year old male with PMH of known aortic root dilatation / aortic dilatation increased to 5.6 cm from 4.8 in 2019 and new onset atrial fibrillation hospitalization on 7/2024 , newly  diagnosed subclinical hyperthyroidism 7/2024, now s/p for Valve sparing Aortic valve replacement , replacement of Ascending aorta, Transverse Hemiarch on 9/10/2024 w/Dr. Serrano.       Assessment  Hyperthyroidism: TSH suppressed, no thyroid dz history, Full TFT panel reviewed, Thyroid AB negative, TSI negative.    Free thyroxine came back normal 1.0-1.4 , still  subclinical. Not in thyrotoxic state.   Thyroid US was done last July with solid mass/nodule   < from: US Thyroid (07.16.24 @ 09:26) >  IMPRESSION:  Complex, hypervascular right cystic and solid mass with scattered   internal calcifications measuring 5.2 x 3.4 x 3.6 cm.  TI-RAD 5: Highly suspicious (FNA if > 1 cm, Follow if > 0.5 cm)    Aortic root dilation: on medications, CT surgery eval, monitored.  HTN: on antihypertensive medications, monitored, asymptomatic.      Discussed plan and management wit Dr Tammy Munoz MD  Cell: [phone]  Office: [phone]

## 2024-09-12 NOTE — PROGRESS NOTE ADULT - ASSESSMENT
48 year old male with known aortic root dilatation / aortic dilatation increased to 5.6 cm from 4.8 in 2019 and new onset atrial fibrillation hospitalization on 7/2024 was on amiodarone and Eliquis (stopped as per Dr. Serrano as per pt, only on metoprolol), newly  diagnosed hyperthyroidism 7/2024/ Graves Disease not on meds ( asymptomatic/ saw endo 7/30/2024 pending work ups after heart surgery as per pt), high prolactin level 7/2024 followed by Neurology ( pending open air MRI 2/2 claustrophobia and further work up after open heart surgery as per pt), planned for Valve sparing Aortic valve replacement , replacement of Ascending aorta, Transverse Hemiarch on 9/10/2024 w/Dr. Serrano. Patient's outpatient preop labs reveals low TSH, Patient asked to present to the hospital 9/9/24 for direct admission for evaluation prior to the OR 9/10.    7/14 ECHO- 2. Aortic root at the sinuses of Valsalva is aneurysmal, measuring 5.10 cm (indexed 2.05 cm/m²). Ascending aorta diameter is aneurysmal, measuring 4.80 cm   A1c 6.4  TSH low   elevated prolactin   S/p valve sparing root Hemiarch replacement 9/10    9/10 extubated in evening   post op exam non focal, walked on unit     Impression:   1) HA stable , none now   2) elevated prolactin    - open MRI outpatient. severely claustrophobic   - f/u endocrine   - s/p amio  - on gabapentin 100 TID   - ASA for now.  eventually doac for AF?    - telemetry  - PT/OT/SS/SLP, OOBC  - check FS, glucose control <180  - GI/DVT ppx  - Thank you for allowing me to participate in the care of this patient. Call with questions.   - remaining per ICU  - outpatient f/u on discharge   - transfer to floor/stepdown when able   Patricio Mina MD  Vascular Neurology  Office: [phone]

## 2024-09-12 NOTE — PROGRESS NOTE ADULT - SUBJECTIVE AND OBJECTIVE BOX
CRITICAL CARE ATTENDING - CTICU    MEDICATIONS  (STANDING):  acetaminophen     Tablet .. 650 milliGRAM(s) Oral every 6 hours  ascorbic acid 500 milliGRAM(s) Oral two times a day  aspirin enteric coated 81 milliGRAM(s) Oral daily  bisacodyl Suppository 10 milliGRAM(s) Rectal once  chlorhexidine 0.12% Liquid 15 milliLiter(s) Oral Mucosa every 12 hours  chlorhexidine 2% Cloths 1 Application(s) Topical daily  chlorhexidine 4% Liquid 1 Application(s) Topical daily  dexMEDEtomidine Infusion 1 MICROgram(s)/kG/Hr (29.3 mL/Hr) IV Continuous <Continuous>  dextrose 50% Injectable 25 milliLiter(s) IV Push every 15 minutes  dextrose 50% Injectable 50 milliLiter(s) IV Push every 15 minutes  DOBUTamine Infusion 4 MICROgram(s)/kG/Min (14.1 mL/Hr) IV Continuous <Continuous>  gabapentin 100 milliGRAM(s) Oral every 8 hours  heparin   Injectable 5000 Unit(s) SubCutaneous every 8 hours  insulin lispro (ADMELOG) corrective regimen sliding scale   SubCutaneous Before meals and at bedtime  meperidine     Injectable 25 milliGRAM(s) IV Push once  milrinone Infusion 0.25 MICROgram(s)/kG/Min (8.78 mL/Hr) IV Continuous <Continuous>  mupirocin 2% Nasal 1 Application(s) Both Nostrils every 12 hours  niCARdipine Infusion 1 mG/Hr (5 mL/Hr) IV Continuous <Continuous>  norepinephrine Infusion 0.05 MICROgram(s)/kG/Min (11 mL/Hr) IV Continuous <Continuous>  polyethylene glycol 3350 17 Gram(s) Oral daily  potassium chloride  10 mEq/50 mL IVPB 10 milliEquivalent(s) IV Intermittent every 1 hour  potassium chloride  10 mEq/50 mL IVPB 10 milliEquivalent(s) IV Intermittent every 1 hour  potassium chloride  10 mEq/50 mL IVPB 10 milliEquivalent(s) IV Intermittent every 1 hour  senna 2 Tablet(s) Oral at bedtime  sodium chloride 0.9% lock flush 3 milliLiter(s) IV Push every 8 hours  sodium chloride 0.9%. 1000 milliLiter(s) (10 mL/Hr) IV Continuous <Continuous>                                    12.0   24.51 )-----------( 93       ( 12 Sep 2024 00:23 )             35.0       09-12    132<L>  |  98  |  22  ----------------------------<  135<H>  4.9   |  23  |  0.81    Ca    8.7      12 Sep 2024 00:24  Phos  3.7     09-12  Mg     2.3     09-12    TPro  6.2  /  Alb  3.7  /  TBili  4.4<H>  /  DBili  x   /  AST  101<H>  /  ALT  38  /  AlkPhos  66  09-12      PT/INR - ( 11 Sep 2024 00:24 )   PT: 11.7 sec;   INR: 1.12 ratio         PTT - ( 11 Sep 2024 00:24 )  PTT:26.9 sec        Daily     Daily       09-10 @ 07:01 - 09-11 @ 07:00  --------------------------------------------------------  IN: 3169.1 mL / OUT: 1425 mL / NET: 1744.1 mL    09-11 @ 07:01  -  09-12 @ 06:53  --------------------------------------------------------  IN: 823.8 mL / OUT: 3765 mL / NET: -2941.2 mL        Critically Ill patient  : [ ] preoperative ,   [x] post operative    Requires :  [x Arterial Line   [x Central Line  [ ] PA catheter  [ ] IABP  [ ] ECMO  [ ] LVAD  [ ] Ventilator  [ xpacemaker - TPM [ ] Impella.                      [x ] ABG's     [ x] Pulse Oxymetry Monitoring  Bedside evaluation , monitoring , treatment of hemodynamics , fluids , IVP/ IVCD meds.        Diagnosis:     9/10 Valve sparing root AA replacement, Encompass ablation, NORIS ligation    CHF- acute [ x]   chronic [ ]    systolic [x ]   diastolic [ ]  Valvular [ ]          - Echo- EF -             [x ] RV dysfunction          - Cxr-cardiomegally, edema          - Clinical-  [xx ]inotropes   [ ]pressors   [x ]diuresis   [ ]IABP   [ ]ECMO   [ ]LVAD   [ x] Respiratory insuffiency     -     Aortic Aneurysm    A Fib.     S/p valve sparing root Hemiarch replacement     Respiratory insuffiencey     Hypoxemia - Requires [ x] NC    Supplemental O2    Post operative pain control     Metabolic Acidosisis    Lactic acidosis    Chest Tube Drainage / Management     Fluid overload     Thrombocytopenia     Temporary pacemaker (TPM) interrogation and setting.     Hemodynamic lability,  instability. Requires IVCD [ x] vasopressors [ x] inotropes  [ ] vasodilator  [x ]IVSS fluid  to maintain MAP, perfusion, C.I.            I, Eduardo Mercer, personally performed the services described in this documentation. All medical record entries made by the scribe were at my direction and in my presence. I have reviewed the chart and agree that the record reflects my personal performance and is accurate and complete.   Eduardo Mercer MD.     By signing my name below, I, Genesis Mortensen, attest that this documentation has been prepared under the direction and in the presence of Eduardo Mercer MD.   Electronically Signed: Debbie Trejo 09-12-24 @ 06:53    Patient requires continuous monitoring with bedside rhythm monitoring, pulse oximetry monitoring, and continuous central venous and arterial pressure monitoring; and intermittent blood gas analysis. Care plan discussed with the ICU care team.   Patient remained critical, at risk for life threatening decompensation.    I have spent 30 minutes providing critical care management to this patient.      Discussed with CT surgeon, Physician Assistant - Nurse Practitioner- Critical care medicine team.   Discussed at  AM / PM rounds.   Chart, labs , films reviewed. CRITICAL CARE ATTENDING - CTICU    MEDICATIONS  (STANDING):  acetaminophen     Tablet .. 650 milliGRAM(s) Oral every 6 hours  ascorbic acid 500 milliGRAM(s) Oral two times a day  aspirin enteric coated 81 milliGRAM(s) Oral daily  bisacodyl Suppository 10 milliGRAM(s) Rectal once  chlorhexidine 0.12% Liquid 15 milliLiter(s) Oral Mucosa every 12 hours  chlorhexidine 2% Cloths 1 Application(s) Topical daily  chlorhexidine 4% Liquid 1 Application(s) Topical daily  dexMEDEtomidine Infusion 1 MICROgram(s)/kG/Hr (29.3 mL/Hr) IV Continuous <Continuous>  dextrose 50% Injectable 25 milliLiter(s) IV Push every 15 minutes  dextrose 50% Injectable 50 milliLiter(s) IV Push every 15 minutes  DOBUTamine Infusion 4 MICROgram(s)/kG/Min (14.1 mL/Hr) IV Continuous <Continuous>  gabapentin 100 milliGRAM(s) Oral every 8 hours  heparin   Injectable 5000 Unit(s) SubCutaneous every 8 hours  insulin lispro (ADMELOG) corrective regimen sliding scale   SubCutaneous Before meals and at bedtime  meperidine     Injectable 25 milliGRAM(s) IV Push once  milrinone Infusion 0.25 MICROgram(s)/kG/Min (8.78 mL/Hr) IV Continuous <Continuous>  mupirocin 2% Nasal 1 Application(s) Both Nostrils every 12 hours  niCARdipine Infusion 1 mG/Hr (5 mL/Hr) IV Continuous <Continuous>  norepinephrine Infusion 0.05 MICROgram(s)/kG/Min (11 mL/Hr) IV Continuous <Continuous>  polyethylene glycol 3350 17 Gram(s) Oral daily  potassium chloride  10 mEq/50 mL IVPB 10 milliEquivalent(s) IV Intermittent every 1 hour  potassium chloride  10 mEq/50 mL IVPB 10 milliEquivalent(s) IV Intermittent every 1 hour  potassium chloride  10 mEq/50 mL IVPB 10 milliEquivalent(s) IV Intermittent every 1 hour  senna 2 Tablet(s) Oral at bedtime  sodium chloride 0.9% lock flush 3 milliLiter(s) IV Push every 8 hours  sodium chloride 0.9%. 1000 milliLiter(s) (10 mL/Hr) IV Continuous <Continuous>                                    12.0   24.51 )-----------( 93       ( 12 Sep 2024 00:23 )             35.0       09-12    132<L>  |  98  |  22  ----------------------------<  135<H>  4.9   |  23  |  0.81    Ca    8.7      12 Sep 2024 00:24  Phos  3.7     09-12  Mg     2.3     09-12    TPro  6.2  /  Alb  3.7  /  TBili  4.4<H>  /  DBili  x   /  AST  101<H>  /  ALT  38  /  AlkPhos  66  09-12      PT/INR - ( 11 Sep 2024 00:24 )   PT: 11.7 sec;   INR: 1.12 ratio         PTT - ( 11 Sep 2024 00:24 )  PTT:26.9 sec        Daily     Daily       09-10 @ 07:01  -  09-11 @ 07:00  --------------------------------------------------------  IN: 3169.1 mL / OUT: 1425 mL / NET: 1744.1 mL    09-11 @ 07:01  -  09-12 @ 06:53  --------------------------------------------------------  IN: 823.8 mL / OUT: 3765 mL / NET: -2941.2 mL        Critically Ill patient  : [ ] preoperative ,   [x] post operative    Requires :  [x Arterial Line   [x Central Line  [ ] PA catheter  [ ] IABP  [ ] ECMO  [ ] LVAD  [ ] Ventilator  [ x] pacemaker - TPM [ ] Impella.                      [x ] ABG's     [ x] Pulse Oxymetry Monitoring  Bedside evaluation , monitoring , treatment of hemodynamics , fluids , IVP/ IVCD meds.        Diagnosis:     9/10 Valve sparing root AA replacement, Encompass ablation, NORIS ligation    CHF- acute [ x]   chronic [ ]    systolic [x ]   diastolic [ ]  Valvular [ ]          - Echo- EF -             [x ] RV dysfunction          - Cxr-cardiomegally, edema          - Clinical-  [xx ]inotropes   [x ]pressors - on/off    [x ]diuresis   [ ]IABP   [ ]ECMO   [ ]LVAD   [ x] Respiratory insuffiency     -     Aortic Aneurysm    A Fib.     S/p valve sparing root Hemiarch replacement     Respiratory insuffiencey     Hypoxemia - Requires [ x] NC    Supplemental O2    Post operative pain control     Hyponatremia     Thrombocytopenia     Prerenal Azotemia     Chest Tube Drainage / Management     Fluid overload     Temporary pacemaker (TPM) interrogation and setting.     Hemodynamic lability,  instability. Requires IVCD [ x] vasopressors [ x] inotropes  [ ] vasodilator  [x ]IVSS fluid  to maintain MAP, perfusion, C.I.      Requires bedside physical therapy, mobilization and total [facility] care.           I, Eduardo Mercer, personally performed the services described in this documentation. All medical record entries made by the scribe were at my direction and in my presence. I have reviewed the chart and agree that the record reflects my personal performance and is accurate and complete.   Eduardo Mercer MD.     By signing my name below, I, Genesis Mortensen, attest that this documentation has been prepared under the direction and in the presence of Eduardo Mercer MD.   Electronically Signed: Debbie Trejo 09-12-24 @ 06:53    Patient requires continuous monitoring with bedside rhythm monitoring, pulse oximetry monitoring, and continuous central venous and arterial pressure monitoring; and intermittent blood gas analysis. Care plan discussed with the ICU care team.   Patient remained critical, at risk for life threatening decompensation.    I have spent 30 minutes providing critical care management to this patient.      Discussed with CT surgeon, Physician Assistant - Nurse Practitioner- Critical care medicine team.   Discussed at  AM / PM rounds.   Chart, labs , films reviewed.

## 2024-09-12 NOTE — PROGRESS NOTE ADULT - SUBJECTIVE AND OBJECTIVE BOX
PATIENT TRANSFERRED FROM CTU. POD # 2.    SUBJECTIVE: "Hi." Denies acute chest pain, palpitations, or shortness of breath    TELEMETRY:  SR 80-90    Vital Signs Last 24 Hrs  T(C): 37.2 (09-12-24 @ 15:25), Max: 37.5 (09-11-24 @ 20:00)  T(F): 98.9 (09-12-24 @ 15:25), Max: 99.5 (09-11-24 @ 20:00)  HR: 87 (09-12-24 @ 15:25) (68 - 87)  BP: 149/82 (09-12-24 @ 15:25) (136/85 - 149/82)  RR: 18 (09-12-24 @ 15:25) (6 - 33)  SpO2: 95% (09-12-24 @ 15:25) (94% - 100%)           INPUT/OUTPUT:  11 Sep 2024 07:01  -  12 Sep 2024 07:00  --------------------------------------------------------  IN:    Amiodarone: 66.8 mL    DOBUTamine: 77 mL    Insulin: 41 mL    IV PiggyBack: 100 mL    Milrinone: 96.8 mL    Norepinephrine: 2.2 mL    Oral Fluid: 200 mL    sodium chloride 0.9%: 240 mL  Total IN: 823.8 mL    OUT:    Chest Tube (mL): 245 mL    Chest Tube (mL): 80 mL    Chest Tube (mL): 160 mL    Dexmedetomidine: 0 mL    Indwelling Catheter - Urethral (mL): 3280 mL    NiCARdipine: 0 mL  Total OUT: 3765 mL    Total NET: -2941.2 mL      12 Sep 2024 07:01  -  12 Sep 2024 17:08  --------------------------------------------------------  IN:    Milrinone: 96.8 mL    Oral Fluid: 200 mL    sodium chloride 0.9%: 10 mL  Total IN: 306.8 mL  OUT:    Chest Tube (mL): 14 mL    Chest Tube (mL): 42 mL    Chest Tube (mL): 52 mL    Dexmedetomidine: 0 mL    DOBUTamine: 0 mL    Indwelling Catheter - Urethral (mL): 465 mL    NiCARdipine: 0 mL    Norepinephrine: 0 mL    Voided (mL): 1025 mL  Total OUT: 1598 mL  Total NET: -1291.2 mL            LABS:  09-12  132<L>  |  98  |  22  ----------------------------<  135<H>  4.9   |  23  |  0.81  Ca    8.7      12 Sep 2024 00:24  Phos  3.7     09-12  Mg     2.3     09-12  TPro  6.2  /  Alb  3.7  /  TBili  4.4<H>  /  DBili  x   /  AST  101<H>  /  ALT  38  /  AlkPhos  66  09-12             12.0   24.51 )-----------( 93       ( 12 Sep 2024 00:23 )             35.0   PT/INR - ( 11 Sep 2024 00:24 )   PT: 11.7 sec;   INR: 1.12 ratio    PTT - ( 11 Sep 2024 00:24 )  PTT:26.9 sec      CAPILLARY BLOOD GLUCOSE  POCT Blood Glucose.: 131 mg/dL (12 Sep 2024 16:46)  POCT Blood Glucose.: 179 mg/dL (12 Sep 2024 13:50)  POCT Blood Glucose.: 154 mg/dL (12 Sep 2024 12:39)  POCT Blood Glucose.: 170 mg/dL (12 Sep 2024 08:09)  POCT Blood Glucose.: 156 mg/dL (12 Sep 2024 06:37)  POCT Blood Glucose.: 150 mg/dL (11 Sep 2024 22:04)  POCT Blood Glucose.: 149 mg/dL (11 Sep 2024 20:08)  POCT Blood Glucose.: 122 mg/dL (11 Sep 2024 18:34)      PHYSICAL EXAM:  NEURO: alert and oriented; no gross, focal neurological deficits appreciated at time of evaluation  HEART: s1, s2; (+) PW -> EPM (45/10)  STERNAL WOUND: MSI -->CDI, sternum stable    (+) MED CT x3 to LWS, negative air leak; occlusive dressing c/d/i  LUNG: non-labored breathing with no use of accessory muscles  ABD: soft, (+) bowel sounds in all quadrants, +flatus  EXT: range of motion intact, peripherial pulses intact bilaterally,  trace B/L LE edema      ACTIVE MEDICATIONS:  acetaminophen     Tablet .. 650 milliGRAM(s) Oral every 6 hours  ascorbic acid 500 milliGRAM(s) Oral two times a day  aspirin enteric coated 81 milliGRAM(s) Oral daily  bisacodyl Suppository 10 milliGRAM(s) Rectal once  chlorhexidine 0.12% Liquid 15 milliLiter(s) Oral Mucosa every 12 hours  chlorhexidine 2% Cloths 1 Application(s) Topical daily  chlorhexidine 4% Liquid 1 Application(s) Topical daily  dextrose 50% Injectable 50 milliLiter(s) IV Push every 15 minutes  dextrose 50% Injectable 25 milliLiter(s) IV Push every 15 minutes  gabapentin 100 milliGRAM(s) Oral every 8 hours  heparin   Injectable 5000 Unit(s) SubCutaneous every 8 hours  HYDROmorphone  Injectable 0.5 milliGRAM(s) IV Push every 6 hours PRN  insulin lispro (ADMELOG) corrective regimen sliding scale   SubCutaneous Before meals and at bedtime  meperidine     Injectable 25 milliGRAM(s) IV Push once  metoclopramide 5 milliGRAM(s) Oral daily  milrinone Infusion 0.25 MICROgram(s)/kG/Min IV Continuous <Continuous>  mupirocin 2% Nasal 1 Application(s) Both Nostrils every 12 hours  oxyCODONE    IR 5 milliGRAM(s) Oral every 4 hours PRN  oxyCODONE    IR 10 milliGRAM(s) Oral every 4 hours PRN  polyethylene glycol 3350 17 Gram(s) Oral daily  potassium chloride  10 mEq/50 mL IVPB 10 milliEquivalent(s) IV Intermittent every 1 hour  potassium chloride  10 mEq/50 mL IVPB 10 milliEquivalent(s) IV Intermittent every 1 hour  potassium chloride  10 mEq/50 mL IVPB 10 milliEquivalent(s) IV Intermittent every 1 hour  senna 2 Tablet(s) Oral at bedtime  sodium chloride 0.9% lock flush 3 milliLiter(s) IV Push every 8 hours  sodium chloride 0.9%. 1000 milliLiter(s) IV Continuous <Continuous>      Case discussed in detail with Cardiothoracic Team and Attending. Plan below as per discussion.

## 2024-09-12 NOTE — PROGRESS NOTE ADULT - SUBJECTIVE AND OBJECTIVE BOX
Neurology      S: patient seen in ICU. in chair. doing okay       Medications: MEDICATIONS  (STANDING):  acetaminophen     Tablet .. 650 milliGRAM(s) Oral every 6 hours  ascorbic acid 500 milliGRAM(s) Oral two times a day  aspirin enteric coated 81 milliGRAM(s) Oral daily  bisacodyl Suppository 10 milliGRAM(s) Rectal once  chlorhexidine 0.12% Liquid 15 milliLiter(s) Oral Mucosa every 12 hours  chlorhexidine 2% Cloths 1 Application(s) Topical daily  chlorhexidine 4% Liquid 1 Application(s) Topical daily  dextrose 50% Injectable 25 milliLiter(s) IV Push every 15 minutes  dextrose 50% Injectable 50 milliLiter(s) IV Push every 15 minutes  furosemide   Injectable 20 milliGRAM(s) IV Push once  gabapentin 100 milliGRAM(s) Oral every 8 hours  heparin   Injectable 5000 Unit(s) SubCutaneous every 8 hours  insulin lispro (ADMELOG) corrective regimen sliding scale   SubCutaneous Before meals and at bedtime  meperidine     Injectable 25 milliGRAM(s) IV Push once  metoclopramide 5 milliGRAM(s) Oral daily  milrinone Infusion 0.25 MICROgram(s)/kG/Min (8.78 mL/Hr) IV Continuous <Continuous>  mupirocin 2% Nasal 1 Application(s) Both Nostrils every 12 hours  niCARdipine Infusion 1 mG/Hr (5 mL/Hr) IV Continuous <Continuous>  norepinephrine Infusion 0.05 MICROgram(s)/kG/Min (11 mL/Hr) IV Continuous <Continuous>  polyethylene glycol 3350 17 Gram(s) Oral daily  potassium chloride  10 mEq/50 mL IVPB 10 milliEquivalent(s) IV Intermittent every 1 hour  potassium chloride  10 mEq/50 mL IVPB 10 milliEquivalent(s) IV Intermittent every 1 hour  potassium chloride  10 mEq/50 mL IVPB 10 milliEquivalent(s) IV Intermittent every 1 hour  senna 2 Tablet(s) Oral at bedtime  sodium chloride 0.9% lock flush 3 milliLiter(s) IV Push every 8 hours  sodium chloride 0.9%. 1000 milliLiter(s) (10 mL/Hr) IV Continuous <Continuous>    MEDICATIONS  (PRN):  HYDROmorphone  Injectable 0.5 milliGRAM(s) IV Push every 6 hours PRN Breakthrough Pain  oxyCODONE    IR 5 milliGRAM(s) Oral every 4 hours PRN Moderate Pain (4 - 6)  oxyCODONE    IR 10 milliGRAM(s) Oral every 4 hours PRN Severe Pain (7 - 10)       Vitals:  Vital Signs Last 24 Hrs  T(C): 37.4 (12 Sep 2024 08:00), Max: 37.5 (11 Sep 2024 20:00)  T(F): 99.3 (12 Sep 2024 08:00), Max: 99.5 (11 Sep 2024 20:00)  HR: 75 (12 Sep 2024 08:00) (68 - 88)  BP: 136/85 (11 Sep 2024 17:30) (121/80 - 143/85)  BP(mean): 100 (11 Sep 2024 17:30) (96 - 109)  RR: 6 (12 Sep 2024 08:00) (3 - 33)  SpO2: 98% (12 Sep 2024 08:00) (94% - 100%)    Parameters below as of 12 Sep 2024 08:00  Patient On (Oxygen Delivery Method): nasal cannula  O2 Flow (L/min): 3            General Exam:   General Appearance: Appropriately dressed and in no acute distress       Head: Normocephalic, atraumatic and no dysmorphic features  Ear, Nose, and Throat: Moist mucous membranes  CVS: S1S2+  Resp: No SOB, no wheeze or rhonchi  GI: soft NT/ND  Extremities: No edema or cyanosis  Skin: No bruises or rashes     Neurological Exam:  Mental Status: Awake, alert and oriented x 3.  Able to follow simple and complex verbal commands. Able to name and repeat. fluent speech. No obvious aphasia or dysarthria noted.   Cranial Nerves: PERRL, EOMI, VFFC, sensation V1-V3 intact,  no obvious facial asymmetry, equal elevation of palate, scm/trap 5/5, tongue is midline on protrusion. no obvious papilledema on fundoscopic exam. hearing is grossly intact.   Motor: Normal bulk, tone and strength throughout. Fine finger movements were intact and symmetric. no tremors or drift noted.    Sensation: Intact to light touch and pinprick throughout. no right/left confusion. no extinction to tactile on DSS.    Reflexes: 1+ throughout at biceps, brachioradialis, triceps, patellars and ankles bilaterally and equal. No clonus. R toe and L toe were both downgoing.  Coordination: No dysmetria on FNF or HKS  Gait:  walked on unit     Data/Labs/Imaging which I personally reviewed.       LABS:                          12.0   24.51 )-----------( 93       ( 12 Sep 2024 00:23 )             35.0     09-12    132<L>  |  98  |  22  ----------------------------<  135<H>  4.9   |  23  |  0.81    Ca    8.7      12 Sep 2024 00:24  Phos  3.7     09-12  Mg     2.3     09-12    TPro  6.2  /  Alb  3.7  /  TBili  4.4<H>  /  DBili  x   /  AST  101<H>  /  ALT  38  /  AlkPhos  66  09-12    LIVER FUNCTIONS - ( 12 Sep 2024 00:24 )  Alb: 3.7 g/dL / Pro: 6.2 g/dL / ALK PHOS: 66 U/L / ALT: 38 U/L / AST: 101 U/L / GGT: x           PT/INR - ( 11 Sep 2024 00:24 )   PT: 11.7 sec;   INR: 1.12 ratio         PTT - ( 11 Sep 2024 00:24 )  PTT:26.9 sec  Urinalysis Basic - ( 12 Sep 2024 00:24 )    Color: x / Appearance: x / SG: x / pH: x  Gluc: 135 mg/dL / Ketone: x  / Bili: x / Urobili: x   Blood: x / Protein: x / Nitrite: x   Leuk Esterase: x / RBC: x / WBC x   Sq Epi: x / Non Sq Epi: x / Bacteria: x

## 2024-09-12 NOTE — PROGRESS NOTE ADULT - ASSESSMENT
48 year old male with PMH of known aortic root dilatation / aortic dilatation increased to 5.6 cm from 4.8 in 2019 and new onset atrial fibrillation hospitalization on 7/2024 was on amiodarone and Eliquis (stopped as per Dr. Serrano as per pt, only on metoprolol), newly  diagnosed hyperthyroidism 7/2024/ Graves Disease not on meds ( asymptomatic/ saw endo 7/30/2024 pending work ups after heart surgery as per pt), high prolactin level 7/2024 followed by Neurology ( pending open air MRI 2/2 claustrophobia and further work up after open heart surgery as per pt), planned for Valve sparing Aortic valve replacement , replacement of Ascending aorta, Transverse Hemiarch on 9/10/2024 w/Dr. Serrano. Patient's outpatient preop labs reveals low TSH, Patient asked to present to the hospital 9/9/24 for direct admission for evaluation prior to the OR 9/10.    9/10 S/p valve sparing root Hemiarch replacement; extubated in evening  9/11  Neuro c/s for HA -> rec open outpatient MRI 2/2 severe claustrophobia. Endo following for Graves Disease. Endo c/s  -> Free thyroxine 1.0, subclinical as per endo. No Amiodarone 2/2 hyperthyroidism.  EP followed for new onset afib in 07/2024 2/2 hyperthyroidism. No amiodarone 2/2 Hyperthyroid as per EP. EP s/off.   9/12 POD#2. Transferred to SDU with MED CHEST TUBE X3 -> LWS and (+) PW -> EPM 45/10. Medication regimen maintained.  48 year old male with PMH of known aortic root dilatation / aortic dilatation increased to 5.6 cm from 4.8 in 2019 and new onset atrial fibrillation hospitalization on 7/2024 was on amiodarone and Eliquis (stopped as per Dr. Serrano as per pt, only on metoprolol), newly  diagnosed hyperthyroidism 7/2024/ Graves Disease not on meds ( asymptomatic/ saw endo 7/30/2024 pending work ups after heart surgery as per pt), high prolactin level 7/2024 followed by Neurology ( pending open air MRI 2/2 claustrophobia and further work up after open heart surgery as per pt), planned for Valve sparing Aortic valve replacement , replacement of Ascending aorta, Transverse Hemiarch on 9/10/2024 w/Dr. Serrano. Patient's outpatient preop labs reveals low TSH, Patient asked to present to the hospital 9/9/24 for direct admission for evaluation prior to the OR 9/10.    9/10 s/p Valve sparing root AA replacement, Encompass ablation, NORIS ligation; extubated in evening  9/11  Neuro c/s for HA -> rec open outpatient MRI 2/2 severe claustrophobia. Endo following for Graves Disease. Endo c/s  -> Free thyroxine 1.0, subclinical as per endo. No Amiodarone 2/2 hyperthyroidism.  EP followed for new onset afib in 07/2024 2/2 hyperthyroidism. No amiodarone 2/2 Hyperthyroid as per EP. EP s/off.   9/12 POD#2. RIJ INTRO D/C in CTU. Transferred to SDU with MED CHEST TUBES X3 -> LWS and (+) PW -> EPM 45/10. On Primacor gtt. Medication regimen maintained.

## 2024-09-12 NOTE — PROGRESS NOTE ADULT - SUBJECTIVE AND OBJECTIVE BOX
Chief complaint  Patient is a 48y old  Male who presents with a chief complaint of Preop, Low TSH (12 Sep 2024 11:12)         Labs and Fingersticks  CAPILLARY BLOOD GLUCOSE      POCT Blood Glucose.: 179 mg/dL (12 Sep 2024 13:50)  POCT Blood Glucose.: 154 mg/dL (12 Sep 2024 12:39)  POCT Blood Glucose.: 170 mg/dL (12 Sep 2024 08:09)  POCT Blood Glucose.: 156 mg/dL (12 Sep 2024 06:37)  POCT Blood Glucose.: 150 mg/dL (11 Sep 2024 22:04)  POCT Blood Glucose.: 149 mg/dL (11 Sep 2024 20:08)  POCT Blood Glucose.: 122 mg/dL (11 Sep 2024 18:34)  POCT Blood Glucose.: 97 mg/dL (11 Sep 2024 16:56)  POCT Blood Glucose.: 111 mg/dL (11 Sep 2024 15:42)      Anion Gap: 11 (09-12 @ 00:24)  Anion Gap: 11 (09-11 @ 14:20)  Anion Gap: 15 (09-11 @ 00:24)  Anion Gap: 17 (09-10 @ 17:47)      Calcium: 8.7 (09-12 @ 00:24)  Calcium: 8.7 (09-11 @ 14:20)  Calcium: 8.8 (09-11 @ 00:24)  Calcium: 9.2 (09-10 @ 17:47)  Albumin: 3.7 (09-12 @ 00:24)  Albumin: 3.6 (09-11 @ 00:24)  Albumin: 3.3 (09-10 @ 17:47)    Alanine Aminotransferase (ALT/SGPT): 38 (09-12 @ 00:24)  Alanine Aminotransferase (ALT/SGPT): 20 (09-11 @ 00:24)  Alanine Aminotransferase (ALT/SGPT): 15 (09-10 @ 17:47)  Alkaline Phosphatase: 66 (09-12 @ 00:24)  Alkaline Phosphatase: 75 (09-11 @ 00:24)  Alkaline Phosphatase: 70 (09-10 @ 17:47)  Aspartate Aminotransferase (AST/SGOT): 101 *H* (09-12 @ 00:24)  Aspartate Aminotransferase (AST/SGOT): 84 *H* (09-11 @ 00:24)  Aspartate Aminotransferase (AST/SGOT): 59 *H* (09-10 @ 17:47)        09-12    132<L>  |  98  |  22  ----------------------------<  135<H>  4.9   |  23  |  0.81    Ca    8.7      12 Sep 2024 00:24  Phos  3.7     09-12  Mg     2.3     09-12    TPro  6.2  /  Alb  3.7  /  TBili  4.4<H>  /  DBili  x   /  AST  101<H>  /  ALT  38  /  AlkPhos  66  09-12                        12.0   24.51 )-----------( 93       ( 12 Sep 2024 00:23 )             35.0     Medications  MEDICATIONS  (STANDING):  acetaminophen     Tablet .. 650 milliGRAM(s) Oral every 6 hours  ascorbic acid 500 milliGRAM(s) Oral two times a day  aspirin enteric coated 81 milliGRAM(s) Oral daily  bisacodyl Suppository 10 milliGRAM(s) Rectal once  chlorhexidine 0.12% Liquid 15 milliLiter(s) Oral Mucosa every 12 hours  chlorhexidine 2% Cloths 1 Application(s) Topical daily  chlorhexidine 4% Liquid 1 Application(s) Topical daily  dextrose 50% Injectable 25 milliLiter(s) IV Push every 15 minutes  dextrose 50% Injectable 50 milliLiter(s) IV Push every 15 minutes  gabapentin 100 milliGRAM(s) Oral every 8 hours  heparin   Injectable 5000 Unit(s) SubCutaneous every 8 hours  insulin lispro (ADMELOG) corrective regimen sliding scale   SubCutaneous Before meals and at bedtime  meperidine     Injectable 25 milliGRAM(s) IV Push once  metoclopramide 5 milliGRAM(s) Oral daily  milrinone Infusion 0.25 MICROgram(s)/kG/Min (8.78 mL/Hr) IV Continuous <Continuous>  mupirocin 2% Nasal 1 Application(s) Both Nostrils every 12 hours  polyethylene glycol 3350 17 Gram(s) Oral daily  potassium chloride  10 mEq/50 mL IVPB 10 milliEquivalent(s) IV Intermittent every 1 hour  potassium chloride  10 mEq/50 mL IVPB 10 milliEquivalent(s) IV Intermittent every 1 hour  potassium chloride  10 mEq/50 mL IVPB 10 milliEquivalent(s) IV Intermittent every 1 hour  senna 2 Tablet(s) Oral at bedtime  sodium chloride 0.9% lock flush 3 milliLiter(s) IV Push every 8 hours  sodium chloride 0.9%. 1000 milliLiter(s) (10 mL/Hr) IV Continuous <Continuous>      Physical Exam  General: Patient comfortable in bed   Vital Signs Last 12 Hrs  T(F): 99.3 (09-12-24 @ 08:00), Max: 99.3 (09-12-24 @ 08:00)  HR: 81 (09-12-24 @ 13:00) (73 - 84)  BP: --  BP(mean): --  RR: 21 (09-12-24 @ 13:00) (6 - 33)  SpO2: 97% (09-12-24 @ 13:00) (95% - 98%)    CVS: S1S2   Respiratory: No wheezing, no crepitations  GI: Abdomen soft, bowel sounds positive  Musculoskeletal:  moves all extremities  : Voiding          Chief complaint  Patient is a 48y old  Male who presents with a chief complaint of Preop, Low TSH (12 Sep 2024 11:12)     Labs and Fingersticks  CAPILLARY BLOOD GLUCOSE      POCT Blood Glucose.: 179 mg/dL (12 Sep 2024 13:50)  POCT Blood Glucose.: 154 mg/dL (12 Sep 2024 12:39)  POCT Blood Glucose.: 170 mg/dL (12 Sep 2024 08:09)  POCT Blood Glucose.: 156 mg/dL (12 Sep 2024 06:37)  POCT Blood Glucose.: 150 mg/dL (11 Sep 2024 22:04)  POCT Blood Glucose.: 149 mg/dL (11 Sep 2024 20:08)  POCT Blood Glucose.: 122 mg/dL (11 Sep 2024 18:34)  POCT Blood Glucose.: 97 mg/dL (11 Sep 2024 16:56)  POCT Blood Glucose.: 111 mg/dL (11 Sep 2024 15:42)      Anion Gap: 11 (09-12 @ 00:24)  Anion Gap: 11 (09-11 @ 14:20)  Anion Gap: 15 (09-11 @ 00:24)  Anion Gap: 17 (09-10 @ 17:47)      Calcium: 8.7 (09-12 @ 00:24)  Calcium: 8.7 (09-11 @ 14:20)  Calcium: 8.8 (09-11 @ 00:24)  Calcium: 9.2 (09-10 @ 17:47)  Albumin: 3.7 (09-12 @ 00:24)  Albumin: 3.6 (09-11 @ 00:24)  Albumin: 3.3 (09-10 @ 17:47)    Alanine Aminotransferase (ALT/SGPT): 38 (09-12 @ 00:24)  Alanine Aminotransferase (ALT/SGPT): 20 (09-11 @ 00:24)  Alanine Aminotransferase (ALT/SGPT): 15 (09-10 @ 17:47)  Alkaline Phosphatase: 66 (09-12 @ 00:24)  Alkaline Phosphatase: 75 (09-11 @ 00:24)  Alkaline Phosphatase: 70 (09-10 @ 17:47)  Aspartate Aminotransferase (AST/SGOT): 101 *H* (09-12 @ 00:24)  Aspartate Aminotransferase (AST/SGOT): 84 *H* (09-11 @ 00:24)  Aspartate Aminotransferase (AST/SGOT): 59 *H* (09-10 @ 17:47)        09-12    132<L>  |  98  |  22  ----------------------------<  135<H>  4.9   |  23  |  0.81    Ca    8.7      12 Sep 2024 00:24  Phos  3.7     09-12  Mg     2.3     09-12    TPro  6.2  /  Alb  3.7  /  TBili  4.4<H>  /  DBili  x   /  AST  101<H>  /  ALT  38  /  AlkPhos  66  09-12                        12.0   24.51 )-----------( 93       ( 12 Sep 2024 00:23 )             35.0     Medications  MEDICATIONS  (STANDING):  acetaminophen     Tablet .. 650 milliGRAM(s) Oral every 6 hours  ascorbic acid 500 milliGRAM(s) Oral two times a day  aspirin enteric coated 81 milliGRAM(s) Oral daily  bisacodyl Suppository 10 milliGRAM(s) Rectal once  chlorhexidine 0.12% Liquid 15 milliLiter(s) Oral Mucosa every 12 hours  chlorhexidine 2% Cloths 1 Application(s) Topical daily  chlorhexidine 4% Liquid 1 Application(s) Topical daily  dextrose 50% Injectable 25 milliLiter(s) IV Push every 15 minutes  dextrose 50% Injectable 50 milliLiter(s) IV Push every 15 minutes  gabapentin 100 milliGRAM(s) Oral every 8 hours  heparin   Injectable 5000 Unit(s) SubCutaneous every 8 hours  insulin lispro (ADMELOG) corrective regimen sliding scale   SubCutaneous Before meals and at bedtime  meperidine     Injectable 25 milliGRAM(s) IV Push once  metoclopramide 5 milliGRAM(s) Oral daily  milrinone Infusion 0.25 MICROgram(s)/kG/Min (8.78 mL/Hr) IV Continuous <Continuous>  mupirocin 2% Nasal 1 Application(s) Both Nostrils every 12 hours  polyethylene glycol 3350 17 Gram(s) Oral daily  potassium chloride  10 mEq/50 mL IVPB 10 milliEquivalent(s) IV Intermittent every 1 hour  potassium chloride  10 mEq/50 mL IVPB 10 milliEquivalent(s) IV Intermittent every 1 hour  potassium chloride  10 mEq/50 mL IVPB 10 milliEquivalent(s) IV Intermittent every 1 hour  senna 2 Tablet(s) Oral at bedtime  sodium chloride 0.9% lock flush 3 milliLiter(s) IV Push every 8 hours  sodium chloride 0.9%. 1000 milliLiter(s) (10 mL/Hr) IV Continuous <Continuous>      Physical Exam  General: Patient comfortable in bed   Vital Signs Last 12 Hrs  T(F): 99.3 (09-12-24 @ 08:00), Max: 99.3 (09-12-24 @ 08:00)  HR: 81 (09-12-24 @ 13:00) (73 - 84)  BP: --  BP(mean): --  RR: 21 (09-12-24 @ 13:00) (6 - 33)  SpO2: 97% (09-12-24 @ 13:00) (95% - 98%)    CVS: S1S2   Respiratory: No wheezing, no crepitations  GI: Abdomen soft, bowel sounds positive  Musculoskeletal:  moves all extremities  : Voiding

## 2024-09-13 LAB
ALBUMIN SERPL ELPH-MCNC: 3.5 G/DL — SIGNIFICANT CHANGE UP (ref 3.3–5)
ALP SERPL-CCNC: 70 U/L — SIGNIFICANT CHANGE UP (ref 40–120)
ALT FLD-CCNC: 42 U/L — SIGNIFICANT CHANGE UP (ref 10–45)
ANION GAP SERPL CALC-SCNC: 10 MMOL/L — SIGNIFICANT CHANGE UP (ref 5–17)
AST SERPL-CCNC: 64 U/L — HIGH (ref 10–40)
BASOPHILS # BLD AUTO: 0.01 K/UL — SIGNIFICANT CHANGE UP (ref 0–0.2)
BASOPHILS NFR BLD AUTO: 0.1 % — SIGNIFICANT CHANGE UP (ref 0–2)
BILIRUB SERPL-MCNC: 2 MG/DL — HIGH (ref 0.2–1.2)
BUN SERPL-MCNC: 15 MG/DL — SIGNIFICANT CHANGE UP (ref 7–23)
CALCIUM SERPL-MCNC: 8.4 MG/DL — SIGNIFICANT CHANGE UP (ref 8.4–10.5)
CHLORIDE SERPL-SCNC: 98 MMOL/L — SIGNIFICANT CHANGE UP (ref 96–108)
CO2 SERPL-SCNC: 26 MMOL/L — SIGNIFICANT CHANGE UP (ref 22–31)
CREAT SERPL-MCNC: 0.63 MG/DL — SIGNIFICANT CHANGE UP (ref 0.5–1.3)
EGFR: 119 ML/MIN/1.73M2 — SIGNIFICANT CHANGE UP
EOSINOPHIL # BLD AUTO: 0 K/UL — SIGNIFICANT CHANGE UP (ref 0–0.5)
EOSINOPHIL NFR BLD AUTO: 0 % — SIGNIFICANT CHANGE UP (ref 0–6)
GAS PNL BLDA: SIGNIFICANT CHANGE UP
GLUCOSE BLDC GLUCOMTR-MCNC: 137 MG/DL — HIGH (ref 70–99)
GLUCOSE BLDC GLUCOMTR-MCNC: 143 MG/DL — HIGH (ref 70–99)
GLUCOSE BLDC GLUCOMTR-MCNC: 148 MG/DL — HIGH (ref 70–99)
GLUCOSE BLDC GLUCOMTR-MCNC: 150 MG/DL — HIGH (ref 70–99)
GLUCOSE SERPL-MCNC: 149 MG/DL — HIGH (ref 70–99)
HCT VFR BLD CALC: 33.2 % — LOW (ref 39–50)
HGB BLD-MCNC: 11.2 G/DL — LOW (ref 13–17)
IMM GRANULOCYTES NFR BLD AUTO: 1 % — HIGH (ref 0–0.9)
LYMPHOCYTES # BLD AUTO: 12.1 % — LOW (ref 13–44)
LYMPHOCYTES # BLD AUTO: 2.28 K/UL — SIGNIFICANT CHANGE UP (ref 1–3.3)
MAGNESIUM SERPL-MCNC: 1.8 MG/DL — SIGNIFICANT CHANGE UP (ref 1.6–2.6)
MCHC RBC-ENTMCNC: 25.5 PG — LOW (ref 27–34)
MCHC RBC-ENTMCNC: 33.7 GM/DL — SIGNIFICANT CHANGE UP (ref 32–36)
MCV RBC AUTO: 75.5 FL — LOW (ref 80–100)
MONOCYTES # BLD AUTO: 1.72 K/UL — HIGH (ref 0–0.9)
MONOCYTES NFR BLD AUTO: 9.2 % — SIGNIFICANT CHANGE UP (ref 2–14)
NEUTROPHILS # BLD AUTO: 14.57 K/UL — HIGH (ref 1.8–7.4)
NEUTROPHILS NFR BLD AUTO: 77.6 % — HIGH (ref 43–77)
NRBC # BLD: 0 /100 WBCS — SIGNIFICANT CHANGE UP (ref 0–0)
PHOSPHATE SERPL-MCNC: 1.7 MG/DL — LOW (ref 2.5–4.5)
PLATELET # BLD AUTO: 96 K/UL — LOW (ref 150–400)
POTASSIUM SERPL-MCNC: 4 MMOL/L — SIGNIFICANT CHANGE UP (ref 3.5–5.3)
POTASSIUM SERPL-SCNC: 4 MMOL/L — SIGNIFICANT CHANGE UP (ref 3.5–5.3)
PROT SERPL-MCNC: 6.5 G/DL — SIGNIFICANT CHANGE UP (ref 6–8.3)
RBC # BLD: 4.4 M/UL — SIGNIFICANT CHANGE UP (ref 4.2–5.8)
RBC # FLD: 15.3 % — HIGH (ref 10.3–14.5)
SODIUM SERPL-SCNC: 134 MMOL/L — LOW (ref 135–145)
SURGICAL PATHOLOGY STUDY: SIGNIFICANT CHANGE UP
WBC # BLD: 18.77 K/UL — HIGH (ref 3.8–10.5)
WBC # FLD AUTO: 18.77 K/UL — HIGH (ref 3.8–10.5)

## 2024-09-13 PROCEDURE — 71045 X-RAY EXAM CHEST 1 VIEW: CPT | Mod: 26

## 2024-09-13 RX ADMIN — CHLORHEXIDINE GLUCONATE 1 APPLICATION(S): 40 SOLUTION TOPICAL at 09:14

## 2024-09-13 RX ADMIN — Medication 1 APPLICATION(S): at 05:40

## 2024-09-13 RX ADMIN — Medication 5000 UNIT(S): at 05:40

## 2024-09-13 RX ADMIN — Medication 5000 UNIT(S): at 15:44

## 2024-09-13 RX ADMIN — Medication 1 APPLICATION(S): at 17:12

## 2024-09-13 RX ADMIN — SODIUM CHLORIDE 3 MILLILITER(S): 9 INJECTION INTRAMUSCULAR; INTRAVENOUS; SUBCUTANEOUS at 13:52

## 2024-09-13 RX ADMIN — Medication 2 TABLET(S): at 21:42

## 2024-09-13 RX ADMIN — Medication 5 MILLIGRAM(S): at 13:52

## 2024-09-13 RX ADMIN — CHLORHEXIDINE GLUCONATE 15 MILLILITER(S): 40 SOLUTION TOPICAL at 17:12

## 2024-09-13 RX ADMIN — Medication 5000 UNIT(S): at 21:43

## 2024-09-13 RX ADMIN — OXYCODONE HYDROCHLORIDE 10 MILLIGRAM(S): 5 TABLET ORAL at 10:20

## 2024-09-13 RX ADMIN — SODIUM CHLORIDE 3 MILLILITER(S): 9 INJECTION INTRAMUSCULAR; INTRAVENOUS; SUBCUTANEOUS at 05:26

## 2024-09-13 RX ADMIN — ACETAMINOPHEN 650 MILLIGRAM(S): 325 TABLET ORAL at 12:59

## 2024-09-13 RX ADMIN — SODIUM CHLORIDE 3 MILLILITER(S): 9 INJECTION INTRAMUSCULAR; INTRAVENOUS; SUBCUTANEOUS at 21:10

## 2024-09-13 RX ADMIN — POLYETHYLENE GLYCOL 3350 17 GRAM(S): 17 POWDER, FOR SOLUTION ORAL at 12:58

## 2024-09-13 RX ADMIN — ACETAMINOPHEN 650 MILLIGRAM(S): 325 TABLET ORAL at 06:12

## 2024-09-13 RX ADMIN — ACETAMINOPHEN 650 MILLIGRAM(S): 325 TABLET ORAL at 05:40

## 2024-09-13 RX ADMIN — OXYCODONE HYDROCHLORIDE 10 MILLIGRAM(S): 5 TABLET ORAL at 22:15

## 2024-09-13 RX ADMIN — OXYCODONE HYDROCHLORIDE 10 MILLIGRAM(S): 5 TABLET ORAL at 21:42

## 2024-09-13 RX ADMIN — Medication 81 MILLIGRAM(S): at 12:58

## 2024-09-13 RX ADMIN — OXYCODONE HYDROCHLORIDE 10 MILLIGRAM(S): 5 TABLET ORAL at 06:11

## 2024-09-13 RX ADMIN — Medication 500 MILLIGRAM(S): at 17:12

## 2024-09-13 RX ADMIN — Medication 100 MILLIGRAM(S): at 05:39

## 2024-09-13 RX ADMIN — Medication 500 MILLIGRAM(S): at 05:40

## 2024-09-13 RX ADMIN — OXYCODONE HYDROCHLORIDE 10 MILLIGRAM(S): 5 TABLET ORAL at 09:53

## 2024-09-13 RX ADMIN — OXYCODONE HYDROCHLORIDE 10 MILLIGRAM(S): 5 TABLET ORAL at 05:53

## 2024-09-13 RX ADMIN — Medication 100 MILLIGRAM(S): at 21:43

## 2024-09-13 NOTE — OCCUPATIONAL THERAPY INITIAL EVALUATION ADULT - DIAGNOSIS, OT EVAL
Pt presents with sternal precautions however, not impacting ability to perform ADLs and functional mobility.

## 2024-09-13 NOTE — PROGRESS NOTE ADULT - SUBJECTIVE AND OBJECTIVE BOX
Neurology      S: patient seen in now in stepdown. doing well       Medications: MEDICATIONS  (STANDING):  acetaminophen     Tablet .. 650 milliGRAM(s) Oral every 6 hours  ascorbic acid 500 milliGRAM(s) Oral two times a day  aspirin enteric coated 81 milliGRAM(s) Oral daily  bisacodyl Suppository 10 milliGRAM(s) Rectal once  chlorhexidine 0.12% Liquid 15 milliLiter(s) Oral Mucosa every 12 hours  chlorhexidine 2% Cloths 1 Application(s) Topical daily  chlorhexidine 4% Liquid 1 Application(s) Topical daily  dextrose 50% Injectable 50 milliLiter(s) IV Push every 15 minutes  dextrose 50% Injectable 25 milliLiter(s) IV Push every 15 minutes  gabapentin 100 milliGRAM(s) Oral every 8 hours  heparin   Injectable 5000 Unit(s) SubCutaneous every 8 hours  insulin lispro (ADMELOG) corrective regimen sliding scale   SubCutaneous Before meals and at bedtime  meperidine     Injectable 25 milliGRAM(s) IV Push once  metoclopramide 5 milliGRAM(s) Oral daily  milrinone Infusion 0.125 MICROgram(s)/kG/Min (4.39 mL/Hr) IV Continuous <Continuous>  mupirocin 2% Nasal 1 Application(s) Both Nostrils every 12 hours  polyethylene glycol 3350 17 Gram(s) Oral daily  potassium chloride  10 mEq/50 mL IVPB 10 milliEquivalent(s) IV Intermittent every 1 hour  potassium chloride  10 mEq/50 mL IVPB 10 milliEquivalent(s) IV Intermittent every 1 hour  potassium chloride  10 mEq/50 mL IVPB 10 milliEquivalent(s) IV Intermittent every 1 hour  senna 2 Tablet(s) Oral at bedtime  sodium chloride 0.9% lock flush 3 milliLiter(s) IV Push every 8 hours  sodium chloride 0.9%. 1000 milliLiter(s) (10 mL/Hr) IV Continuous <Continuous>    MEDICATIONS  (PRN):  acetaminophen     Tablet .. 650 milliGRAM(s) Oral every 6 hours PRN Mild Pain (1 - 3)  oxyCODONE    IR 5 milliGRAM(s) Oral every 4 hours PRN Moderate Pain (4 - 6)  oxyCODONE    IR 10 milliGRAM(s) Oral every 4 hours PRN Severe Pain (7 - 10)       Vitals:  Vital Signs Last 24 Hrs  T(C): 37.7 (13 Sep 2024 07:08), Max: 37.9 (13 Sep 2024 03:11)  T(F): 99.9 (13 Sep 2024 07:08), Max: 100.2 (13 Sep 2024 03:11)  HR: 86 (13 Sep 2024 09:02) (81 - 88)  BP: 113/68 (13 Sep 2024 07:08) (113/68 - 149/82)  BP(mean): 85 (13 Sep 2024 07:08) (83 - 89)  RR: 18 (13 Sep 2024 07:08) (18 - 21)  SpO2: 96% (13 Sep 2024 09:02) (93% - 97%)    Parameters below as of 13 Sep 2024 09:02  Patient On (Oxygen Delivery Method): room air              General Exam:   General Appearance: Appropriately dressed and in no acute distress       Head: Normocephalic, atraumatic and no dysmorphic features  Ear, Nose, and Throat: Moist mucous membranes  CVS: S1S2+  Resp: No SOB, no wheeze or rhonchi  GI: soft NT/ND  Extremities: No edema or cyanosis  Skin: No bruises or rashes     Neurological Exam:  Mental Status: Awake, alert and oriented x 3.  Able to follow simple and complex verbal commands. Able to name and repeat. fluent speech. No obvious aphasia or dysarthria noted.   Cranial Nerves: PERRL, EOMI, VFFC, sensation V1-V3 intact,  no obvious facial asymmetry, equal elevation of palate, scm/trap 5/5, tongue is midline on protrusion. no obvious papilledema on fundoscopic exam. hearing is grossly intact.   Motor: Normal bulk, tone and strength throughout. Fine finger movements were intact and symmetric. no tremors or drift noted.    Sensation: Intact to light touch and pinprick throughout. no right/left confusion. no extinction to tactile on DSS.    Reflexes: 1+ throughout at biceps, brachioradialis, triceps, patellars and ankles bilaterally and equal. No clonus. R toe and L toe were both downgoing.  Coordination: No dysmetria on FNF or HKS  Gait:  walked on unit     Data/Labs/Imaging which I personally reviewed.      LABS:                          11.2   18.77 )-----------( 96       ( 13 Sep 2024 06:59 )             33.2     09-13    134<L>  |  98  |  15  ----------------------------<  149<H>  4.0   |  26  |  0.63    Ca    8.4      13 Sep 2024 06:59  Phos  1.7     09-13  Mg     1.8     09-13    TPro  6.5  /  Alb  3.5  /  TBili  2.0<H>  /  DBili  x   /  AST  64<H>  /  ALT  42  /  AlkPhos  70  09-13    LIVER FUNCTIONS - ( 13 Sep 2024 06:59 )  Alb: 3.5 g/dL / Pro: 6.5 g/dL / ALK PHOS: 70 U/L / ALT: 42 U/L / AST: 64 U/L / GGT: x             Urinalysis Basic - ( 13 Sep 2024 06:59 )    Color: x / Appearance: x / SG: x / pH: x  Gluc: 149 mg/dL / Ketone: x  / Bili: x / Urobili: x   Blood: x / Protein: x / Nitrite: x   Leuk Esterase: x / RBC: x / WBC x   Sq Epi: x / Non Sq Epi: x / Bacteria: x

## 2024-09-13 NOTE — PROGRESS NOTE ADULT - ASSESSMENT
48 year old male with PMH of known aortic root dilatation / aortic dilatation increased to 5.6 cm from 4.8 in 2019 and new onset atrial fibrillation hospitalization on 7/2024 , newly  diagnosed subclinical hyperthyroidism 7/2024, now s/p for Valve sparing Aortic valve replacement , replacement of Ascending aorta, Transverse Hemiarch on 9/10/2024 w/Dr. Serrano.     Assessment  Hyperthyroidism: TSH suppressed, no thyroid dz history, Full TFT panel reviewed, Thyroid AB negative, TSI negative.    Free thyroxine came back normal 1.0-1.4 , still  subclinical. Not in thyrotoxic state.   Thyroid US was done last July with solid mass/nodule   < from: US Thyroid (07.16.24 @ 09:26) >  IMPRESSION:  Complex, hypervascular right cystic and solid mass with scattered   internal calcifications measuring 5.2 x 3.4 x 3.6 cm.  TI-RAD 5: Highly suspicious (FNA if > 1 cm, Follow if > 0.5 cm)    Aortic root dilation: on medications, CT surgery eval, monitored.  HTN: on antihypertensive medications, monitored, asymptomatic.      Discussed plan and management wit Dr Tammy Munoz MD  Cell: [phone]  Office: [phone]

## 2024-09-13 NOTE — PROGRESS NOTE ADULT - SUBJECTIVE AND OBJECTIVE BOX
Subjective:  "Hello"  OOB chair      Tele:  SR  80s           V/S:                    T(F): 99.9 (24 @ 07:08), Max: 100.2 (24 @ 03:11)  HR: 86 (24 @ 07:08) (77 - 88)  BP: 113/68 (24 @ 07:08) (113/68 - 149/82)  RR: 18 (24 @ 07:08) (18 - 26)  SpO2: 93% (24 @ 07:08) (93% - 98%)  Wt(kg): --      LV EF:      Labs:      134<L>  |  98  |  15  ----------------------------<  149<H>  4.0   |  26  |  0.63    Ca    8.4      13 Sep 2024 06:59  Phos  1.7       Mg     1.8         TPro  6.5  /  Alb  3.5  /  TBili  2.0<H>  /  DBili  x   /  AST  64<H>  /  ALT  42  /  AlkPhos  70                                 11.2   18.77 )-----------( 96       ( 13 Sep 2024 06:59 )             33.2             CAPILLARY BLOOD GLUCOSE      POCT Blood Glucose.: 148 mg/dL (13 Sep 2024 07:48)  POCT Blood Glucose.: 170 mg/dL (12 Sep 2024 21:19)  POCT Blood Glucose.: 131 mg/dL (12 Sep 2024 16:46)  POCT Blood Glucose.: 179 mg/dL (12 Sep 2024 13:50)  POCT Blood Glucose.: 154 mg/dL (12 Sep 2024 12:39)           CXR:    I&O's Detail    12 Sep 2024 07:01  -  13 Sep 2024 07:00  --------------------------------------------------------  IN:    Milrinone: 193.6 mL    Oral Fluid: 200 mL    sodium chloride 0.9%: 10 mL  Total IN: 403.6 mL    OUT:    Chest Tube (mL): 62 mL    Chest Tube (mL): 62 mL    Chest Tube (mL): 34 mL    Dexmedetomidine: 0 mL    DOBUTamine: 0 mL    Indwelling Catheter - Urethral (mL): 465 mL    NiCARdipine: 0 mL    Norepinephrine: 0 mL    Voided (mL): 3125 mL  Total OUT: 3748 mL    Total NET: -3344.4 mL      13 Sep 2024 07:01  -  13 Sep 2024 10:17  --------------------------------------------------------  IN:  Total IN: 0 mL    OUT:    Voided (mL): 400 mL  Total OUT: 400 mL    Total NET: -400 mL      VELASQUEZ DRAIN:   x 3    [ x] YES [ ] NO  OUTPUT:     per 24 hours    EPICARDIAL WIRES:  [ x] YES [ ] NO      BOWEL MOVEMENT:  [ ] YES [ x] NO      Daily     Daily Weight in k.8 (13 Sep 2024 06:10)  Medications:  acetaminophen     Tablet .. 650 milliGRAM(s) Oral every 6 hours  acetaminophen     Tablet .. 650 milliGRAM(s) Oral every 6 hours PRN  ascorbic acid 500 milliGRAM(s) Oral two times a day  aspirin enteric coated 81 milliGRAM(s) Oral daily  bisacodyl Suppository 10 milliGRAM(s) Rectal once  chlorhexidine 0.12% Liquid 15 milliLiter(s) Oral Mucosa every 12 hours  chlorhexidine 2% Cloths 1 Application(s) Topical daily  chlorhexidine 4% Liquid 1 Application(s) Topical daily  dextrose 50% Injectable 25 milliLiter(s) IV Push every 15 minutes  dextrose 50% Injectable 50 milliLiter(s) IV Push every 15 minutes  gabapentin 100 milliGRAM(s) Oral every 8 hours  heparin   Injectable 5000 Unit(s) SubCutaneous every 8 hours  insulin lispro (ADMELOG) corrective regimen sliding scale   SubCutaneous Before meals and at bedtime  meperidine     Injectable 25 milliGRAM(s) IV Push once  metoclopramide 5 milliGRAM(s) Oral daily  milrinone Infusion 0.125 MICROgram(s)/kG/Min IV Continuous <Continuous>  mupirocin 2% Nasal 1 Application(s) Both Nostrils every 12 hours  oxyCODONE    IR 5 milliGRAM(s) Oral every 4 hours PRN  oxyCODONE    IR 10 milliGRAM(s) Oral every 4 hours PRN  polyethylene glycol 3350 17 Gram(s) Oral daily  potassium chloride  10 mEq/50 mL IVPB 10 milliEquivalent(s) IV Intermittent every 1 hour  potassium chloride  10 mEq/50 mL IVPB 10 milliEquivalent(s) IV Intermittent every 1 hour  potassium chloride  10 mEq/50 mL IVPB 10 milliEquivalent(s) IV Intermittent every 1 hour  senna 2 Tablet(s) Oral at bedtime  sodium chloride 0.9% lock flush 3 milliLiter(s) IV Push every 8 hours  sodium chloride 0.9%. 1000 milliLiter(s) IV Continuous <Continuous>        Physical Exam:    NEURO: alert and oriented; no gross, focal neurological deficits appreciated at time of evaluation  HEART: s1, s2; (+) PW -> EPM (45/10)  STERNAL WOUND: MSI -->CDI, sternum stable    (+) MED CT x3 to LWS, negative air leak; occlusive dressing c/d/i  LUNG: non-labored breathing with no use of accessory muscles  ABD: soft, (+) bowel sounds in all quadrants, +flatus  EXT: range of motion intact, peripherial pulses intact bilaterally,  trace B/L LE edema             PAST MEDICAL & SURGICAL HISTORY:  Dilated aortic root      Marijuana use      History of prediabetes      Paroxysmal atrial fibrillation      Mild asthma      Hyperthyroidism      Thyroid nodule      Elevated prolactin level      Graves disease      H/O cardiomegaly      Enlarged thyroid gland      Thoracic aortic aneurysm      H/O wisdom tooth extraction

## 2024-09-13 NOTE — OCCUPATIONAL THERAPY INITIAL EVALUATION ADULT - PERTINENT HX OF CURRENT PROBLEM, REHAB EVAL
48 year old male with PMH of known aortic root dilatation / aortic dilatation increased to 5.6 cm from 4.8 in 2019 and new onset atrial fibrillation hospitalization on 7/2024 was on amiodarone and Eliquis (stopped as per Dr. Serrano as per pt, only on metoprolol), newly  diagnosed hyperthyroidism 7/2024/ Graves Disease not on meds ( asymptomatic/ saw endo 7/30/2024 pending work ups after heart surgery as per pt), high prolactin level 7/2024 followed by Neurology ( pending open air MRI 2/2 claustrophobia and further work up after open heart surgery as per pt). 9/10 s/p Valve sparing root AA replacement, Encompass ablation, NORIS ligation; extubated in evening. 9/11  Neuro c/s for HA -> rec open outpatient MRI 2/2 severe claustrophobia. Endo following for Graves Disease. Endo c/s  -> Free thyroxine 1.0, subclinical as per endo. No Amiodarone 2/2 hyperthyroidism. EP followed for new onset afib in 07/2024 2/2 hyperthyroidism. No amiodarone 2/2 Hyperthyroid as per EP. EP s/off.
Statement Selected

## 2024-09-13 NOTE — OCCUPATIONAL THERAPY INITIAL EVALUATION ADULT - LIVES WITH, PROFILE
Pt lives in a house with his fiance & 4 kids, 4 steps to enter, 1 flight of stairs inside, IND PTA without AD

## 2024-09-13 NOTE — PROGRESS NOTE ADULT - SUBJECTIVE AND OBJECTIVE BOX
Chief complaint  Patient is a 48y old  Male who presents with a chief complaint of Preop, Low TSH (13 Sep 2024 10:22)         Labs and Fingersticks  CAPILLARY BLOOD GLUCOSE      POCT Blood Glucose.: 137 mg/dL (13 Sep 2024 16:45)  POCT Blood Glucose.: 150 mg/dL (13 Sep 2024 11:31)  POCT Blood Glucose.: 148 mg/dL (13 Sep 2024 07:48)  POCT Blood Glucose.: 170 mg/dL (12 Sep 2024 21:19)      Anion Gap: 10 (09-13 @ 06:59)  Anion Gap: 11 (09-12 @ 00:24)      Calcium: 8.4 (09-13 @ 06:59)  Calcium: 8.7 (09-12 @ 00:24)  Albumin: 3.5 (09-13 @ 06:59)  Albumin: 3.7 (09-12 @ 00:24)    Alanine Aminotransferase (ALT/SGPT): 42 (09-13 @ 06:59)  Alanine Aminotransferase (ALT/SGPT): 38 (09-12 @ 00:24)  Alkaline Phosphatase: 70 (09-13 @ 06:59)  Alkaline Phosphatase: 66 (09-12 @ 00:24)  Aspartate Aminotransferase (AST/SGOT): 64 *H* (09-13 @ 06:59)  Aspartate Aminotransferase (AST/SGOT): 101 *H* (09-12 @ 00:24)        09-13    134<L>  |  98  |  15  ----------------------------<  149<H>  4.0   |  26  |  0.63    Ca    8.4      13 Sep 2024 06:59  Phos  1.7     09-13  Mg     1.8     09-13    TPro  6.5  /  Alb  3.5  /  TBili  2.0<H>  /  DBili  x   /  AST  64<H>  /  ALT  42  /  AlkPhos  70  09-13                        11.2   18.77 )-----------( 96       ( 13 Sep 2024 06:59 )             33.2     Medications  MEDICATIONS  (STANDING):  ascorbic acid 500 milliGRAM(s) Oral two times a day  aspirin enteric coated 81 milliGRAM(s) Oral daily  bisacodyl Suppository 10 milliGRAM(s) Rectal once  chlorhexidine 0.12% Liquid 15 milliLiter(s) Oral Mucosa every 12 hours  chlorhexidine 2% Cloths 1 Application(s) Topical daily  chlorhexidine 4% Liquid 1 Application(s) Topical daily  dextrose 50% Injectable 50 milliLiter(s) IV Push every 15 minutes  dextrose 50% Injectable 25 milliLiter(s) IV Push every 15 minutes  gabapentin 100 milliGRAM(s) Oral every 8 hours  heparin   Injectable 5000 Unit(s) SubCutaneous every 8 hours  insulin lispro (ADMELOG) corrective regimen sliding scale   SubCutaneous Before meals and at bedtime  meperidine     Injectable 25 milliGRAM(s) IV Push once  metoclopramide 5 milliGRAM(s) Oral daily  milrinone Infusion 0.125 MICROgram(s)/kG/Min (4.39 mL/Hr) IV Continuous <Continuous>  mupirocin 2% Nasal 1 Application(s) Both Nostrils every 12 hours  polyethylene glycol 3350 17 Gram(s) Oral daily  potassium chloride  10 mEq/50 mL IVPB 10 milliEquivalent(s) IV Intermittent every 1 hour  potassium chloride  10 mEq/50 mL IVPB 10 milliEquivalent(s) IV Intermittent every 1 hour  potassium chloride  10 mEq/50 mL IVPB 10 milliEquivalent(s) IV Intermittent every 1 hour  senna 2 Tablet(s) Oral at bedtime  sodium chloride 0.9% lock flush 3 milliLiter(s) IV Push every 8 hours  sodium chloride 0.9%. 1000 milliLiter(s) (10 mL/Hr) IV Continuous <Continuous>      Physical Exam  General: Patient comfortable in bed   Vital Signs Last 12 Hrs  T(F): 98 (09-13-24 @ 15:06), Max: 99.9 (09-13-24 @ 07:08)  HR: 85 (09-13-24 @ 15:06) (85 - 86)  BP: 126/70 (09-13-24 @ 15:06) (113/68 - 132/75)  BP(mean): 88 (09-13-24 @ 15:06) (85 - 98)  RR: 18 (09-13-24 @ 15:06) (18 - 18)  SpO2: 94% (09-13-24 @ 15:06) (93% - 96%)    CVS: S1S2   Respiratory: No wheezing, no crepitations  GI: Abdomen soft, bowel sounds positive  Musculoskeletal:  moves all extremities  : Voiding          Chief complaint  Patient is a 48y old  Male who presents with a chief complaint of Preop, Low TSH (13 Sep 2024 10:22)       Labs and Fingersticks  CAPILLARY BLOOD GLUCOSE      POCT Blood Glucose.: 137 mg/dL (13 Sep 2024 16:45)  POCT Blood Glucose.: 150 mg/dL (13 Sep 2024 11:31)  POCT Blood Glucose.: 148 mg/dL (13 Sep 2024 07:48)  POCT Blood Glucose.: 170 mg/dL (12 Sep 2024 21:19)      Anion Gap: 10 (09-13 @ 06:59)  Anion Gap: 11 (09-12 @ 00:24)      Calcium: 8.4 (09-13 @ 06:59)  Calcium: 8.7 (09-12 @ 00:24)  Albumin: 3.5 (09-13 @ 06:59)  Albumin: 3.7 (09-12 @ 00:24)    Alanine Aminotransferase (ALT/SGPT): 42 (09-13 @ 06:59)  Alanine Aminotransferase (ALT/SGPT): 38 (09-12 @ 00:24)  Alkaline Phosphatase: 70 (09-13 @ 06:59)  Alkaline Phosphatase: 66 (09-12 @ 00:24)  Aspartate Aminotransferase (AST/SGOT): 64 *H* (09-13 @ 06:59)  Aspartate Aminotransferase (AST/SGOT): 101 *H* (09-12 @ 00:24)        09-13    134<L>  |  98  |  15  ----------------------------<  149<H>  4.0   |  26  |  0.63    Ca    8.4      13 Sep 2024 06:59  Phos  1.7     09-13  Mg     1.8     09-13    TPro  6.5  /  Alb  3.5  /  TBili  2.0<H>  /  DBili  x   /  AST  64<H>  /  ALT  42  /  AlkPhos  70  09-13                        11.2   18.77 )-----------( 96       ( 13 Sep 2024 06:59 )             33.2     Medications  MEDICATIONS  (STANDING):  ascorbic acid 500 milliGRAM(s) Oral two times a day  aspirin enteric coated 81 milliGRAM(s) Oral daily  bisacodyl Suppository 10 milliGRAM(s) Rectal once  chlorhexidine 0.12% Liquid 15 milliLiter(s) Oral Mucosa every 12 hours  chlorhexidine 2% Cloths 1 Application(s) Topical daily  chlorhexidine 4% Liquid 1 Application(s) Topical daily  dextrose 50% Injectable 50 milliLiter(s) IV Push every 15 minutes  dextrose 50% Injectable 25 milliLiter(s) IV Push every 15 minutes  gabapentin 100 milliGRAM(s) Oral every 8 hours  heparin   Injectable 5000 Unit(s) SubCutaneous every 8 hours  insulin lispro (ADMELOG) corrective regimen sliding scale   SubCutaneous Before meals and at bedtime  meperidine     Injectable 25 milliGRAM(s) IV Push once  metoclopramide 5 milliGRAM(s) Oral daily  milrinone Infusion 0.125 MICROgram(s)/kG/Min (4.39 mL/Hr) IV Continuous <Continuous>  mupirocin 2% Nasal 1 Application(s) Both Nostrils every 12 hours  polyethylene glycol 3350 17 Gram(s) Oral daily  potassium chloride  10 mEq/50 mL IVPB 10 milliEquivalent(s) IV Intermittent every 1 hour  potassium chloride  10 mEq/50 mL IVPB 10 milliEquivalent(s) IV Intermittent every 1 hour  potassium chloride  10 mEq/50 mL IVPB 10 milliEquivalent(s) IV Intermittent every 1 hour  senna 2 Tablet(s) Oral at bedtime  sodium chloride 0.9% lock flush 3 milliLiter(s) IV Push every 8 hours  sodium chloride 0.9%. 1000 milliLiter(s) (10 mL/Hr) IV Continuous <Continuous>      Physical Exam  General: Patient comfortable in bed   Vital Signs Last 12 Hrs  T(F): 98 (09-13-24 @ 15:06), Max: 99.9 (09-13-24 @ 07:08)  HR: 85 (09-13-24 @ 15:06) (85 - 86)  BP: 126/70 (09-13-24 @ 15:06) (113/68 - 132/75)  BP(mean): 88 (09-13-24 @ 15:06) (85 - 98)  RR: 18 (09-13-24 @ 15:06) (18 - 18)  SpO2: 94% (09-13-24 @ 15:06) (93% - 96%)    CVS: S1S2   Respiratory: No wheezing, no crepitations  GI: Abdomen soft, bowel sounds positive  Musculoskeletal:  moves all extremities  : Voiding      3

## 2024-09-13 NOTE — PROGRESS NOTE ADULT - ASSESSMENT
48 year old male with PMH of known aortic root dilatation / aortic dilatation increased to 5.6 cm from 4.8 in 2019 and new onset atrial fibrillation hospitalization on 7/2024 was on amiodarone and Eliquis (stopped as per Dr. eSrrano as per pt, only on metoprolol), newly  diagnosed hyperthyroidism 7/2024/ Graves Disease not on meds ( asymptomatic/ saw endo 7/30/2024 pending work ups after heart surgery as per pt), high prolactin level 7/2024 followed by Neurology ( pending open air MRI 2/2 claustrophobia and further work up after open heart surgery as per pt), planned for Valve sparing Aortic valve replacement , replacement of Ascending aorta, Transverse Hemiarch on 9/10/2024 w/Dr. Serrano. Patient's outpatient preop labs reveals low TSH, Patient asked to present to the hospital 9/9/24 for direct admission for evaluation prior to the OR 9/10.    9/10 s/p Valve sparing root AA replacement, Encompass ablation, NORIS ligation; extubated in evening  9/11  Neuro c/s for HA -> rec open outpatient MRI 2/2 severe claustrophobia. Endo following for Graves Disease. Endo c/s  -> Free thyroxine 1.0, subclinical as per endo. No Amiodarone 2/2 hyperthyroidism.  EP followed for new onset afib in 07/2024 2/2 hyperthyroidism. No amiodarone 2/2 Hyperthyroid as per EP. EP s/off.   9/12 POD#2. RIJ INTRO D/C in CTU. Transferred to SDU with MED CHEST TUBES X3 -> LWS and (+) PW -> EPM 45/10. On Primacor gtt. Medication regimen maintained.   9/13 DC Meds #1 & #2  Isolate PW  OOB  Reduce Primacor 0.125  Lactate at 2 pm

## 2024-09-13 NOTE — PROGRESS NOTE ADULT - ASSESSMENT
48 year old male with known aortic root dilatation / aortic dilatation increased to 5.6 cm from 4.8 in 2019 and new onset atrial fibrillation hospitalization on 7/2024 was on amiodarone and Eliquis (stopped as per Dr. Serrano as per pt, only on metoprolol), newly  diagnosed hyperthyroidism 7/2024/ Graves Disease not on meds ( asymptomatic/ saw endo 7/30/2024 pending work ups after heart surgery as per pt), high prolactin level 7/2024 followed by Neurology ( pending open air MRI 2/2 claustrophobia and further work up after open heart surgery as per pt), planned for Valve sparing Aortic valve replacement , replacement of Ascending aorta, Transverse Hemiarch on 9/10/2024 w/Dr. Serrano. Patient's outpatient preop labs reveals low TSH, Patient asked to present to the hospital 9/9/24 for direct admission for evaluation prior to the OR 9/10.    7/14 ECHO- 2. Aortic root at the sinuses of Valsalva is aneurysmal, measuring 5.10 cm (indexed 2.05 cm/m²). Ascending aorta diameter is aneurysmal, measuring 4.80 cm   A1c 6.4  TSH low   elevated prolactin   S/p valve sparing root Hemiarch replacement 9/10    9/10 extubated in evening   post op exam non focal, walked on unit     Impression:   1) HA stable , none now   2) elevated prolactin    - open MRI outpatient. severely claustrophobic   - f/u endocrine   - s/p amio  - on gabapentin 100 TID   - ASA for now.  eventually doac for AF?    - telemetry  - PT/OT/SS/SLP, OOBC  - check FS, glucose control <180  - GI/DVT ppx  - Thank you for allowing me to participate in the care of this patient. Call with questions.   - remaining per CTS/.cardio team   - outpatient f/u on discharge      Patricio Mina MD  Vascular Neurology  Office: [phone]

## 2024-09-14 LAB
ALBUMIN SERPL ELPH-MCNC: 3.3 G/DL — SIGNIFICANT CHANGE UP (ref 3.3–5)
ALP SERPL-CCNC: 69 U/L — SIGNIFICANT CHANGE UP (ref 40–120)
ALT FLD-CCNC: 38 U/L — SIGNIFICANT CHANGE UP (ref 10–45)
ANION GAP SERPL CALC-SCNC: 9 MMOL/L — SIGNIFICANT CHANGE UP (ref 5–17)
AST SERPL-CCNC: 37 U/L — SIGNIFICANT CHANGE UP (ref 10–40)
BASOPHILS # BLD AUTO: 0.02 K/UL — SIGNIFICANT CHANGE UP (ref 0–0.2)
BASOPHILS NFR BLD AUTO: 0.1 % — SIGNIFICANT CHANGE UP (ref 0–2)
BILIRUB SERPL-MCNC: 1.4 MG/DL — HIGH (ref 0.2–1.2)
BUN SERPL-MCNC: 11 MG/DL — SIGNIFICANT CHANGE UP (ref 7–23)
CALCIUM SERPL-MCNC: 8.5 MG/DL — SIGNIFICANT CHANGE UP (ref 8.4–10.5)
CHLORIDE SERPL-SCNC: 97 MMOL/L — SIGNIFICANT CHANGE UP (ref 96–108)
CO2 SERPL-SCNC: 25 MMOL/L — SIGNIFICANT CHANGE UP (ref 22–31)
CREAT SERPL-MCNC: 0.59 MG/DL — SIGNIFICANT CHANGE UP (ref 0.5–1.3)
EGFR: 121 ML/MIN/1.73M2 — SIGNIFICANT CHANGE UP
EOSINOPHIL # BLD AUTO: 0.02 K/UL — SIGNIFICANT CHANGE UP (ref 0–0.5)
EOSINOPHIL NFR BLD AUTO: 0.1 % — SIGNIFICANT CHANGE UP (ref 0–6)
GLUCOSE BLDC GLUCOMTR-MCNC: 114 MG/DL — HIGH (ref 70–99)
GLUCOSE BLDC GLUCOMTR-MCNC: 124 MG/DL — HIGH (ref 70–99)
GLUCOSE BLDC GLUCOMTR-MCNC: 142 MG/DL — HIGH (ref 70–99)
GLUCOSE BLDC GLUCOMTR-MCNC: 157 MG/DL — HIGH (ref 70–99)
GLUCOSE SERPL-MCNC: 125 MG/DL — HIGH (ref 70–99)
HCT VFR BLD CALC: 32.5 % — LOW (ref 39–50)
HGB BLD-MCNC: 11 G/DL — LOW (ref 13–17)
IMM GRANULOCYTES NFR BLD AUTO: 0.5 % — SIGNIFICANT CHANGE UP (ref 0–0.9)
LYMPHOCYTES # BLD AUTO: 14.9 % — SIGNIFICANT CHANGE UP (ref 13–44)
LYMPHOCYTES # BLD AUTO: 2.06 K/UL — SIGNIFICANT CHANGE UP (ref 1–3.3)
MAGNESIUM SERPL-MCNC: 1.8 MG/DL — SIGNIFICANT CHANGE UP (ref 1.6–2.6)
MCHC RBC-ENTMCNC: 26.3 PG — LOW (ref 27–34)
MCHC RBC-ENTMCNC: 33.8 GM/DL — SIGNIFICANT CHANGE UP (ref 32–36)
MCV RBC AUTO: 77.8 FL — LOW (ref 80–100)
MONOCYTES # BLD AUTO: 1.31 K/UL — HIGH (ref 0–0.9)
MONOCYTES NFR BLD AUTO: 9.5 % — SIGNIFICANT CHANGE UP (ref 2–14)
NEUTROPHILS # BLD AUTO: 10.37 K/UL — HIGH (ref 1.8–7.4)
NEUTROPHILS NFR BLD AUTO: 74.9 % — SIGNIFICANT CHANGE UP (ref 43–77)
NRBC # BLD: 0 /100 WBCS — SIGNIFICANT CHANGE UP (ref 0–0)
PHOSPHATE SERPL-MCNC: 2.2 MG/DL — LOW (ref 2.5–4.5)
PLATELET # BLD AUTO: 120 K/UL — LOW (ref 150–400)
POTASSIUM SERPL-MCNC: 3.9 MMOL/L — SIGNIFICANT CHANGE UP (ref 3.5–5.3)
POTASSIUM SERPL-SCNC: 3.9 MMOL/L — SIGNIFICANT CHANGE UP (ref 3.5–5.3)
PROT SERPL-MCNC: 6.6 G/DL — SIGNIFICANT CHANGE UP (ref 6–8.3)
RBC # BLD: 4.18 M/UL — LOW (ref 4.2–5.8)
RBC # FLD: 15.3 % — HIGH (ref 10.3–14.5)
SODIUM SERPL-SCNC: 131 MMOL/L — LOW (ref 135–145)
WBC # BLD: 13.85 K/UL — HIGH (ref 3.8–10.5)
WBC # FLD AUTO: 13.85 K/UL — HIGH (ref 3.8–10.5)

## 2024-09-14 PROCEDURE — 71045 X-RAY EXAM CHEST 1 VIEW: CPT | Mod: 26

## 2024-09-14 RX ORDER — METOPROLOL TARTRATE 100 MG/1
12.5 TABLET ORAL
Refills: 0 | Status: DISCONTINUED | OUTPATIENT
Start: 2024-09-14 | End: 2024-09-15

## 2024-09-14 RX ADMIN — SODIUM CHLORIDE 3 MILLILITER(S): 9 INJECTION INTRAMUSCULAR; INTRAVENOUS; SUBCUTANEOUS at 06:05

## 2024-09-14 RX ADMIN — Medication 1 APPLICATION(S): at 17:03

## 2024-09-14 RX ADMIN — Medication 100 MILLIGRAM(S): at 06:25

## 2024-09-14 RX ADMIN — Medication 500 MILLIGRAM(S): at 17:02

## 2024-09-14 RX ADMIN — Medication 5000 UNIT(S): at 14:26

## 2024-09-14 RX ADMIN — Medication 100 MILLIGRAM(S): at 14:21

## 2024-09-14 RX ADMIN — CHLORHEXIDINE GLUCONATE 1 APPLICATION(S): 40 SOLUTION TOPICAL at 12:14

## 2024-09-14 RX ADMIN — Medication 10 MILLIGRAM(S): at 12:11

## 2024-09-14 RX ADMIN — Medication 2: at 21:49

## 2024-09-14 RX ADMIN — METOPROLOL TARTRATE 12.5 MILLIGRAM(S): 100 TABLET ORAL at 17:03

## 2024-09-14 RX ADMIN — POLYETHYLENE GLYCOL 3350 17 GRAM(S): 17 POWDER, FOR SOLUTION ORAL at 14:26

## 2024-09-14 RX ADMIN — METOPROLOL TARTRATE 12.5 MILLIGRAM(S): 100 TABLET ORAL at 09:12

## 2024-09-14 RX ADMIN — Medication 5 MILLIGRAM(S): at 12:48

## 2024-09-14 RX ADMIN — Medication 1 APPLICATION(S): at 06:26

## 2024-09-14 RX ADMIN — SODIUM CHLORIDE 3 MILLILITER(S): 9 INJECTION INTRAMUSCULAR; INTRAVENOUS; SUBCUTANEOUS at 14:22

## 2024-09-14 RX ADMIN — SODIUM CHLORIDE 3 MILLILITER(S): 9 INJECTION INTRAMUSCULAR; INTRAVENOUS; SUBCUTANEOUS at 21:01

## 2024-09-14 RX ADMIN — Medication 5000 UNIT(S): at 21:36

## 2024-09-14 RX ADMIN — Medication 81 MILLIGRAM(S): at 12:12

## 2024-09-14 RX ADMIN — Medication 100 MILLIGRAM(S): at 21:35

## 2024-09-14 RX ADMIN — Medication 500 MILLIGRAM(S): at 06:26

## 2024-09-14 RX ADMIN — Medication 5000 UNIT(S): at 06:25

## 2024-09-14 NOTE — PROGRESS NOTE ADULT - ASSESSMENT
48 year old male with PMH of known aortic root dilatation / aortic dilatation increased to 5.6 cm from 4.8 in 2019 and new onset atrial fibrillation hospitalization on 7/2024 , newly  diagnosed subclinical hyperthyroidism 7/2024, now s/p for Valve sparing Aortic valve replacement , replacement of Ascending aorta, Transverse Hemiarch on 9/10/2024 w/Dr. Serrano.     Assessment  Hyperthyroidism: TSH suppressed, no thyroid dz history, Full TFT panel reviewed, Thyroid AB negative, TSI negative.    Free thyroxine came back normal 1.0-1.4 , still  subclinical. Not in thyrotoxic state.   Thyroid US was done last July with solid mass/nodule   < from: US Thyroid (07.16.24 @ 09:26) >  IMPRESSION:  Complex, hypervascular right cystic and solid mass with scattered   internal calcifications measuring 5.2 x 3.4 x 3.6 cm.  TI-RAD 5: Highly suspicious (FNA if > 1 cm, Follow if > 0.5 cm)    Aortic root dilation: on medications, CT surgery eval, monitored.  HTN: on antihypertensive medications, monitored, asymptomatic.      Discussed plan and management wit Dr Tammy Munoz MD  Cell: [phone]  Office: [phone] 48 year old male with PMH of known aortic root dilatation / aortic dilatation increased to 5.6 cm from 4.8 in 2019 and new onset atrial fibrillation hospitalization on 7/2024 , newly  diagnosed subclinical hyperthyroidism 7/2024, now s/p for Valve sparing Aortic valve replacement , replacement of Ascending aorta, Transverse Hemiarch on 9/10/2024 w/Dr. Serrano.       Assessment  Hyperthyroidism: TSH suppressed, no thyroid dz history, Full TFT panel reviewed, Thyroid AB negative, TSI negative.    Free thyroxine came back normal 1.0-1.4 , still  subclinical. Not in thyrotoxic state.   Thyroid US was done last July with solid mass/nodule   < from: US Thyroid (07.16.24 @ 09:26) >  IMPRESSION:  Complex, hypervascular right cystic and solid mass with scattered   internal calcifications measuring 5.2 x 3.4 x 3.6 cm.  TI-RAD 5: Highly suspicious (FNA if > 1 cm, Follow if > 0.5 cm)    Aortic root dilation: on medications, CT surgery eval, monitored.  HTN: on antihypertensive medications, monitored, asymptomatic.      Discussed plan and management wit Dr Tammy Munoz MD  Cell: [phone]  Office: [phone]

## 2024-09-14 NOTE — PROGRESS NOTE ADULT - SUBJECTIVE AND OBJECTIVE BOX
Chief complaint  Patient is a 48y old  Male who presents with a chief complaint of Preop, Low TSH (13 Sep 2024 18:46)         Labs and Fingersticks  CAPILLARY BLOOD GLUCOSE      POCT Blood Glucose.: 124 mg/dL (14 Sep 2024 11:10)  POCT Blood Glucose.: 142 mg/dL (14 Sep 2024 07:29)  POCT Blood Glucose.: 143 mg/dL (13 Sep 2024 21:25)  POCT Blood Glucose.: 137 mg/dL (13 Sep 2024 16:45)      Anion Gap: 9 (09-14 @ 06:57)  Anion Gap: 10 (09-13 @ 06:59)      Calcium: 8.5 (09-14 @ 06:57)  Calcium: 8.4 (09-13 @ 06:59)  Albumin: 3.3 (09-14 @ 06:57)  Albumin: 3.5 (09-13 @ 06:59)    Alanine Aminotransferase (ALT/SGPT): 38 (09-14 @ 06:57)  Alanine Aminotransferase (ALT/SGPT): 42 (09-13 @ 06:59)  Alkaline Phosphatase: 69 (09-14 @ 06:57)  Alkaline Phosphatase: 70 (09-13 @ 06:59)  Aspartate Aminotransferase (AST/SGOT): 37 (09-14 @ 06:57)  Aspartate Aminotransferase (AST/SGOT): 64 *H* (09-13 @ 06:59)        09-14    131<L>  |  97  |  11  ----------------------------<  125<H>  3.9   |  25  |  0.59    Ca    8.5      14 Sep 2024 06:57  Phos  2.2     09-14  Mg     1.8     09-14    TPro  6.6  /  Alb  3.3  /  TBili  1.4<H>  /  DBili  x   /  AST  37  /  ALT  38  /  AlkPhos  69  09-14                        11.0   13.85 )-----------( 120      ( 14 Sep 2024 06:57 )             32.5     Medications  MEDICATIONS  (STANDING):  ascorbic acid 500 milliGRAM(s) Oral two times a day  aspirin enteric coated 81 milliGRAM(s) Oral daily  bisacodyl Suppository 10 milliGRAM(s) Rectal once  chlorhexidine 0.12% Liquid 15 milliLiter(s) Oral Mucosa every 12 hours  chlorhexidine 2% Cloths 1 Application(s) Topical daily  chlorhexidine 4% Liquid 1 Application(s) Topical daily  dextrose 50% Injectable 25 milliLiter(s) IV Push every 15 minutes  dextrose 50% Injectable 50 milliLiter(s) IV Push every 15 minutes  gabapentin 100 milliGRAM(s) Oral every 8 hours  heparin   Injectable 5000 Unit(s) SubCutaneous every 8 hours  insulin lispro (ADMELOG) corrective regimen sliding scale   SubCutaneous Before meals and at bedtime  metoclopramide 5 milliGRAM(s) Oral daily  metoprolol tartrate 12.5 milliGRAM(s) Oral two times a day  mupirocin 2% Nasal 1 Application(s) Both Nostrils every 12 hours  polyethylene glycol 3350 17 Gram(s) Oral daily  senna 2 Tablet(s) Oral at bedtime  sodium chloride 0.9% lock flush 3 milliLiter(s) IV Push every 8 hours  sodium chloride 0.9%. 1000 milliLiter(s) (10 mL/Hr) IV Continuous <Continuous>      Physical Exam  General: Patient comfortable in bed   Vital Signs Last 12 Hrs  T(F): 99.9 (09-14-24 @ 07:15), Max: 100 (09-14-24 @ 03:14)  HR: 88 (09-14-24 @ 07:15) (86 - 88)  BP: 136/81 (09-14-24 @ 07:15) (127/73 - 136/81)  BP(mean): 101 (09-14-24 @ 07:15) (93 - 101)  RR: 18 (09-14-24 @ 07:15) (18 - 18)  SpO2: 95% (09-14-24 @ 07:15) (94% - 98%)    CVS: S1S2   Respiratory: No wheezing, no crepitations  GI: Abdomen soft, bowel sounds positive  Musculoskeletal:  moves all extremities  : Voiding          Chief complaint  Patient is a 48y old  Male who presents with a chief complaint of Preop, Low TSH (13 Sep 2024 18:46)     Labs and Fingersticks  CAPILLARY BLOOD GLUCOSE      POCT Blood Glucose.: 124 mg/dL (14 Sep 2024 11:10)  POCT Blood Glucose.: 142 mg/dL (14 Sep 2024 07:29)  POCT Blood Glucose.: 143 mg/dL (13 Sep 2024 21:25)  POCT Blood Glucose.: 137 mg/dL (13 Sep 2024 16:45)      Anion Gap: 9 (09-14 @ 06:57)  Anion Gap: 10 (09-13 @ 06:59)      Calcium: 8.5 (09-14 @ 06:57)  Calcium: 8.4 (09-13 @ 06:59)  Albumin: 3.3 (09-14 @ 06:57)  Albumin: 3.5 (09-13 @ 06:59)    Alanine Aminotransferase (ALT/SGPT): 38 (09-14 @ 06:57)  Alanine Aminotransferase (ALT/SGPT): 42 (09-13 @ 06:59)  Alkaline Phosphatase: 69 (09-14 @ 06:57)  Alkaline Phosphatase: 70 (09-13 @ 06:59)  Aspartate Aminotransferase (AST/SGOT): 37 (09-14 @ 06:57)  Aspartate Aminotransferase (AST/SGOT): 64 *H* (09-13 @ 06:59)        09-14    131<L>  |  97  |  11  ----------------------------<  125<H>  3.9   |  25  |  0.59    Ca    8.5      14 Sep 2024 06:57  Phos  2.2     09-14  Mg     1.8     09-14    TPro  6.6  /  Alb  3.3  /  TBili  1.4<H>  /  DBili  x   /  AST  37  /  ALT  38  /  AlkPhos  69  09-14                        11.0   13.85 )-----------( 120      ( 14 Sep 2024 06:57 )             32.5     Medications  MEDICATIONS  (STANDING):  ascorbic acid 500 milliGRAM(s) Oral two times a day  aspirin enteric coated 81 milliGRAM(s) Oral daily  bisacodyl Suppository 10 milliGRAM(s) Rectal once  chlorhexidine 0.12% Liquid 15 milliLiter(s) Oral Mucosa every 12 hours  chlorhexidine 2% Cloths 1 Application(s) Topical daily  chlorhexidine 4% Liquid 1 Application(s) Topical daily  dextrose 50% Injectable 25 milliLiter(s) IV Push every 15 minutes  dextrose 50% Injectable 50 milliLiter(s) IV Push every 15 minutes  gabapentin 100 milliGRAM(s) Oral every 8 hours  heparin   Injectable 5000 Unit(s) SubCutaneous every 8 hours  insulin lispro (ADMELOG) corrective regimen sliding scale   SubCutaneous Before meals and at bedtime  metoclopramide 5 milliGRAM(s) Oral daily  metoprolol tartrate 12.5 milliGRAM(s) Oral two times a day  mupirocin 2% Nasal 1 Application(s) Both Nostrils every 12 hours  polyethylene glycol 3350 17 Gram(s) Oral daily  senna 2 Tablet(s) Oral at bedtime  sodium chloride 0.9% lock flush 3 milliLiter(s) IV Push every 8 hours  sodium chloride 0.9%. 1000 milliLiter(s) (10 mL/Hr) IV Continuous <Continuous>      Physical Exam  General: Patient comfortable in bed   Vital Signs Last 12 Hrs  T(F): 99.9 (09-14-24 @ 07:15), Max: 100 (09-14-24 @ 03:14)  HR: 88 (09-14-24 @ 07:15) (86 - 88)  BP: 136/81 (09-14-24 @ 07:15) (127/73 - 136/81)  BP(mean): 101 (09-14-24 @ 07:15) (93 - 101)  RR: 18 (09-14-24 @ 07:15) (18 - 18)  SpO2: 95% (09-14-24 @ 07:15) (94% - 98%)    CVS: S1S2   Respiratory: No wheezing, no crepitations  GI: Abdomen soft, bowel sounds positive  Musculoskeletal:  moves all extremities  : Voiding

## 2024-09-14 NOTE — PROGRESS NOTE ADULT - SUBJECTIVE AND OBJECTIVE BOX
Subjective: "Hello"  OOB family visited      Tele:  SR 80-90s           V/S:                    T(F): 99.9 (24 @ 07:15), Max: 100 (24 @ 03:14)  HR: 88 (24 @ 07:15) (85 - 88)  BP: 136/81 (24 @ 07:15) (126/70 - 136/81)  RR: 18 (24 @ 07:15) (18 - 18)  SpO2: 95% (24 @ 07:15) (94% - 98%)  Wt(kg): --      LV EF:      Labs:      131<L>  |  97  |  11  ----------------------------<  125<H>  3.9   |  25  |  0.59    Ca    8.5      14 Sep 2024 06:57  Phos  2.2       Mg     1.8         TPro  6.6  /  Alb  3.3  /  TBili  1.4<H>  /  DBili  x   /  AST  37  /  ALT  38  /  AlkPhos  69                                 11.0   13.85 )-----------( 120      ( 14 Sep 2024 06:57 )             32.5             CAPILLARY BLOOD GLUCOSE      POCT Blood Glucose.: 124 mg/dL (14 Sep 2024 11:10)  POCT Blood Glucose.: 142 mg/dL (14 Sep 2024 07:29)  POCT Blood Glucose.: 143 mg/dL (13 Sep 2024 21:25)  POCT Blood Glucose.: 137 mg/dL (13 Sep 2024 16:45)           CXR:    I&O's Detail    13 Sep 2024 07:01  -  14 Sep 2024 07:00  --------------------------------------------------------  IN:  Total IN: 0 mL    OUT:    Chest Tube (mL): 120 mL    Voided (mL): 2150 mL  Total OUT: 2270 mL    Total NET: -2270 mL      14 Sep 2024 07:01  -  14 Sep 2024 14:05  --------------------------------------------------------  IN:  Total IN: 0 mL    OUT:    Chest Tube (mL): 20 mL    Voided (mL): 300 mL  Total OUT: 320 mL    Total NET: -320 mL      VELASQUEZ DRAIN:   [x ] YES [ ] NO  OUTPUT:     per 24 hours > removed    BOWEL MOVEMENT:  [ ] YES [x ] NO      Daily     Daily Weight in k.3 (14 Sep 2024 08:05)  Medications:  acetaminophen     Tablet .. 650 milliGRAM(s) Oral every 6 hours PRN  ascorbic acid 500 milliGRAM(s) Oral two times a day  aspirin enteric coated 81 milliGRAM(s) Oral daily  bisacodyl Suppository 10 milliGRAM(s) Rectal once  chlorhexidine 0.12% Liquid 15 milliLiter(s) Oral Mucosa every 12 hours  chlorhexidine 2% Cloths 1 Application(s) Topical daily  chlorhexidine 4% Liquid 1 Application(s) Topical daily  dextrose 50% Injectable 50 milliLiter(s) IV Push every 15 minutes  dextrose 50% Injectable 25 milliLiter(s) IV Push every 15 minutes  gabapentin 100 milliGRAM(s) Oral every 8 hours  heparin   Injectable 5000 Unit(s) SubCutaneous every 8 hours  insulin lispro (ADMELOG) corrective regimen sliding scale   SubCutaneous Before meals and at bedtime  metoclopramide 5 milliGRAM(s) Oral daily  metoprolol tartrate 12.5 milliGRAM(s) Oral two times a day  mupirocin 2% Nasal 1 Application(s) Both Nostrils every 12 hours  oxyCODONE    IR 10 milliGRAM(s) Oral every 4 hours PRN  oxyCODONE    IR 5 milliGRAM(s) Oral every 4 hours PRN  polyethylene glycol 3350 17 Gram(s) Oral daily  senna 2 Tablet(s) Oral at bedtime  sodium chloride 0.9% lock flush 3 milliLiter(s) IV Push every 8 hours  sodium chloride 0.9%. 1000 milliLiter(s) IV Continuous <Continuous>        Physical Exam:    NEURO: alert and oriented; no gross,   HEART: s1, s2; RRR  STERNAL WOUND: MSI -->CDI, sternum stable    (+) MED removed  LUNG: non-labored breathing with no use of accessory muscles  ABD: soft, (+) bowel sounds in all quadrants, No BM since surgery  EXT: range of motion intact, peripherial pulses intact bilaterally,  trace B/L LE edema               PAST MEDICAL & SURGICAL HISTORY:  Dilated aortic root      Marijuana use      History of prediabetes      Paroxysmal atrial fibrillation      Mild asthma      Hyperthyroidism      Thyroid nodule      Elevated prolactin level      Graves disease      H/O cardiomegaly      Enlarged thyroid gland      Thoracic aortic aneurysm      H/O wisdom tooth extraction

## 2024-09-14 NOTE — PROGRESS NOTE ADULT - ASSESSMENT
48 year old male with PMH of known aortic root dilatation / aortic dilatation increased to 5.6 cm from 4.8 in 2019 and new onset atrial fibrillation hospitalization on 7/2024 was on amiodarone and Eliquis (stopped as per Dr. Serrano as per pt, only on metoprolol), newly  diagnosed hyperthyroidism 7/2024/ Graves Disease not on meds ( asymptomatic/ saw endo 7/30/2024 pending work ups after heart surgery as per pt), high prolactin level 7/2024 followed by Neurology ( pending open air MRI 2/2 claustrophobia and further work up after open heart surgery as per pt), planned for Valve sparing Aortic valve replacement , replacement of Ascending aorta, Transverse Hemiarch on 9/10/2024 w/Dr. Serrano. Patient's outpatient preop labs reveals low TSH, Patient asked to present to the hospital 9/9/24 for direct admission for evaluation prior to the OR 9/10.    9/10 s/p Valve sparing root AA replacement, Encompass ablation, NORIS ligation; extubated in evening  9/11  Neuro c/s for HA -> rec open outpatient MRI 2/2 severe claustrophobia. Endo following for Graves Disease. Endo c/s  -> Free thyroxine 1.0, subclinical as per endo. No Amiodarone 2/2 hyperthyroidism.  EP followed for new onset afib in 07/2024 2/2 hyperthyroidism. No amiodarone 2/2 Hyperthyroid as per EP. EP s/off.   9/12 POD#2. RIJ INTRO D/C in CTU. Transferred to SDU with MED CHEST TUBES X3 -> LWS and (+) PW -> EPM 45/10. On Primacor gtt. Medication regimen maintained.   9/13 DC Meds #1 & #2  Isolate PW  OOB  Reduce Primacor 0.125  Lactate at 2 pm  9/14 DC med  this am  Ambulate Start betablocker

## 2024-09-15 VITALS
DIASTOLIC BLOOD PRESSURE: 86 MMHG | OXYGEN SATURATION: 98 % | SYSTOLIC BLOOD PRESSURE: 121 MMHG | HEART RATE: 89 BPM | RESPIRATION RATE: 18 BRPM | TEMPERATURE: 98 F

## 2024-09-15 LAB
ALBUMIN SERPL ELPH-MCNC: 3.2 G/DL — LOW (ref 3.3–5)
ALP SERPL-CCNC: 72 U/L — SIGNIFICANT CHANGE UP (ref 40–120)
ALT FLD-CCNC: 39 U/L — SIGNIFICANT CHANGE UP (ref 10–45)
ANION GAP SERPL CALC-SCNC: 9 MMOL/L — SIGNIFICANT CHANGE UP (ref 5–17)
ANISOCYTOSIS BLD QL: SLIGHT — SIGNIFICANT CHANGE UP
AST SERPL-CCNC: 32 U/L — SIGNIFICANT CHANGE UP (ref 10–40)
BASOPHILS # BLD AUTO: 0 K/UL — SIGNIFICANT CHANGE UP (ref 0–0.2)
BASOPHILS NFR BLD AUTO: 0 % — SIGNIFICANT CHANGE UP (ref 0–2)
BILIRUB SERPL-MCNC: 1.3 MG/DL — HIGH (ref 0.2–1.2)
BUN SERPL-MCNC: 12 MG/DL — SIGNIFICANT CHANGE UP (ref 7–23)
CALCIUM SERPL-MCNC: 8.5 MG/DL — SIGNIFICANT CHANGE UP (ref 8.4–10.5)
CHLORIDE SERPL-SCNC: 97 MMOL/L — SIGNIFICANT CHANGE UP (ref 96–108)
CO2 SERPL-SCNC: 27 MMOL/L — SIGNIFICANT CHANGE UP (ref 22–31)
CREAT SERPL-MCNC: 0.66 MG/DL — SIGNIFICANT CHANGE UP (ref 0.5–1.3)
EGFR: 117 ML/MIN/1.73M2 — SIGNIFICANT CHANGE UP
ELLIPTOCYTES BLD QL SMEAR: SLIGHT — SIGNIFICANT CHANGE UP
EOSINOPHIL # BLD AUTO: 0.12 K/UL — SIGNIFICANT CHANGE UP (ref 0–0.5)
EOSINOPHIL NFR BLD AUTO: 0.9 % — SIGNIFICANT CHANGE UP (ref 0–6)
GLUCOSE BLDC GLUCOMTR-MCNC: 115 MG/DL — HIGH (ref 70–99)
GLUCOSE SERPL-MCNC: 118 MG/DL — HIGH (ref 70–99)
HCT VFR BLD CALC: 32.5 % — LOW (ref 39–50)
HGB BLD-MCNC: 10.9 G/DL — LOW (ref 13–17)
LYMPHOCYTES # BLD AUTO: 22.6 % — SIGNIFICANT CHANGE UP (ref 13–44)
LYMPHOCYTES # BLD AUTO: 3.08 K/UL — SIGNIFICANT CHANGE UP (ref 1–3.3)
MAGNESIUM SERPL-MCNC: 1.9 MG/DL — SIGNIFICANT CHANGE UP (ref 1.6–2.6)
MANUAL SMEAR VERIFICATION: SIGNIFICANT CHANGE UP
MCHC RBC-ENTMCNC: 26 PG — LOW (ref 27–34)
MCHC RBC-ENTMCNC: 33.5 GM/DL — SIGNIFICANT CHANGE UP (ref 32–36)
MCV RBC AUTO: 77.6 FL — LOW (ref 80–100)
MICROCYTES BLD QL: SLIGHT — SIGNIFICANT CHANGE UP
MONOCYTES # BLD AUTO: 1.06 K/UL — HIGH (ref 0–0.9)
MONOCYTES NFR BLD AUTO: 7.8 % — SIGNIFICANT CHANGE UP (ref 2–14)
NEUTROPHILS # BLD AUTO: 9.38 K/UL — HIGH (ref 1.8–7.4)
NEUTROPHILS NFR BLD AUTO: 68.7 % — SIGNIFICANT CHANGE UP (ref 43–77)
OVALOCYTES BLD QL SMEAR: SLIGHT — SIGNIFICANT CHANGE UP
PHOSPHATE SERPL-MCNC: 3.1 MG/DL — SIGNIFICANT CHANGE UP (ref 2.5–4.5)
PLAT MORPH BLD: NORMAL — SIGNIFICANT CHANGE UP
PLATELET # BLD AUTO: 136 K/UL — LOW (ref 150–400)
POIKILOCYTOSIS BLD QL AUTO: SLIGHT — SIGNIFICANT CHANGE UP
POLYCHROMASIA BLD QL SMEAR: SLIGHT — SIGNIFICANT CHANGE UP
POTASSIUM SERPL-MCNC: 4.1 MMOL/L — SIGNIFICANT CHANGE UP (ref 3.5–5.3)
POTASSIUM SERPL-SCNC: 4.1 MMOL/L — SIGNIFICANT CHANGE UP (ref 3.5–5.3)
PROT SERPL-MCNC: 6.4 G/DL — SIGNIFICANT CHANGE UP (ref 6–8.3)
RBC # BLD: 4.19 M/UL — LOW (ref 4.2–5.8)
RBC # FLD: 14.9 % — HIGH (ref 10.3–14.5)
RBC BLD AUTO: ABNORMAL
SCHISTOCYTES BLD QL AUTO: SLIGHT — SIGNIFICANT CHANGE UP
SODIUM SERPL-SCNC: 133 MMOL/L — LOW (ref 135–145)
WBC # BLD: 13.65 K/UL — HIGH (ref 3.8–10.5)
WBC # FLD AUTO: 13.65 K/UL — HIGH (ref 3.8–10.5)

## 2024-09-15 PROCEDURE — 85610 PROTHROMBIN TIME: CPT

## 2024-09-15 PROCEDURE — 85018 HEMOGLOBIN: CPT

## 2024-09-15 PROCEDURE — P9100: CPT

## 2024-09-15 PROCEDURE — 85730 THROMBOPLASTIN TIME PARTIAL: CPT

## 2024-09-15 PROCEDURE — 85025 COMPLETE CBC W/AUTO DIFF WBC: CPT

## 2024-09-15 PROCEDURE — 93320 DOPPLER ECHO COMPLETE: CPT

## 2024-09-15 PROCEDURE — 94002 VENT MGMT INPAT INIT DAY: CPT

## 2024-09-15 PROCEDURE — 84443 ASSAY THYROID STIM HORMONE: CPT

## 2024-09-15 PROCEDURE — 85396 CLOTTING ASSAY WHOLE BLOOD: CPT

## 2024-09-15 PROCEDURE — 71045 X-RAY EXAM CHEST 1 VIEW: CPT

## 2024-09-15 PROCEDURE — 97165 OT EVAL LOW COMPLEX 30 MIN: CPT

## 2024-09-15 PROCEDURE — 86850 RBC ANTIBODY SCREEN: CPT

## 2024-09-15 PROCEDURE — 85014 HEMATOCRIT: CPT

## 2024-09-15 PROCEDURE — 82947 ASSAY GLUCOSE BLOOD QUANT: CPT

## 2024-09-15 PROCEDURE — 88305 TISSUE EXAM BY PATHOLOGIST: CPT

## 2024-09-15 PROCEDURE — 93005 ELECTROCARDIOGRAM TRACING: CPT

## 2024-09-15 PROCEDURE — 82962 GLUCOSE BLOOD TEST: CPT

## 2024-09-15 PROCEDURE — 97162 PT EVAL MOD COMPLEX 30 MIN: CPT

## 2024-09-15 PROCEDURE — 86900 BLOOD TYPING SEROLOGIC ABO: CPT

## 2024-09-15 PROCEDURE — P9012: CPT

## 2024-09-15 PROCEDURE — 84295 ASSAY OF SERUM SODIUM: CPT

## 2024-09-15 PROCEDURE — C1751: CPT

## 2024-09-15 PROCEDURE — 84436 ASSAY OF TOTAL THYROXINE: CPT

## 2024-09-15 PROCEDURE — 86891 AUTOLOGOUS BLOOD OP SALVAGE: CPT

## 2024-09-15 PROCEDURE — 86901 BLOOD TYPING SEROLOGIC RH(D): CPT

## 2024-09-15 PROCEDURE — 36415 COLL VENOUS BLD VENIPUNCTURE: CPT

## 2024-09-15 PROCEDURE — 84484 ASSAY OF TROPONIN QUANT: CPT

## 2024-09-15 PROCEDURE — 80048 BASIC METABOLIC PNL TOTAL CA: CPT

## 2024-09-15 PROCEDURE — 86923 COMPATIBILITY TEST ELECTRIC: CPT

## 2024-09-15 PROCEDURE — 86965 POOLING BLOOD PLATELETS: CPT

## 2024-09-15 PROCEDURE — C1889: CPT

## 2024-09-15 PROCEDURE — P9045: CPT

## 2024-09-15 PROCEDURE — 94010 BREATHING CAPACITY TEST: CPT

## 2024-09-15 PROCEDURE — 82330 ASSAY OF CALCIUM: CPT

## 2024-09-15 PROCEDURE — 81003 URINALYSIS AUTO W/O SCOPE: CPT

## 2024-09-15 PROCEDURE — C1769: CPT

## 2024-09-15 PROCEDURE — 82550 ASSAY OF CK (CPK): CPT

## 2024-09-15 PROCEDURE — 83605 ASSAY OF LACTIC ACID: CPT

## 2024-09-15 PROCEDURE — 84480 ASSAY TRIIODOTHYRONINE (T3): CPT

## 2024-09-15 PROCEDURE — 85384 FIBRINOGEN ACTIVITY: CPT

## 2024-09-15 PROCEDURE — 84132 ASSAY OF SERUM POTASSIUM: CPT

## 2024-09-15 PROCEDURE — 82435 ASSAY OF BLOOD CHLORIDE: CPT

## 2024-09-15 PROCEDURE — 82553 CREATINE MB FRACTION: CPT

## 2024-09-15 PROCEDURE — 84100 ASSAY OF PHOSPHORUS: CPT

## 2024-09-15 PROCEDURE — 84439 ASSAY OF FREE THYROXINE: CPT

## 2024-09-15 PROCEDURE — 93325 DOPPLER ECHO COLOR FLOW MAPG: CPT

## 2024-09-15 PROCEDURE — 80053 COMPREHEN METABOLIC PANEL: CPT

## 2024-09-15 PROCEDURE — P9037: CPT

## 2024-09-15 PROCEDURE — 84146 ASSAY OF PROLACTIN: CPT

## 2024-09-15 PROCEDURE — C1768: CPT

## 2024-09-15 PROCEDURE — 85520 HEPARIN ASSAY: CPT

## 2024-09-15 PROCEDURE — 83735 ASSAY OF MAGNESIUM: CPT

## 2024-09-15 PROCEDURE — 82803 BLOOD GASES ANY COMBINATION: CPT

## 2024-09-15 RX ORDER — METOPROLOL TARTRATE 100 MG/1
1 TABLET ORAL
Qty: 30 | Refills: 0
Start: 2024-09-15 | End: 2024-10-14

## 2024-09-15 RX ORDER — SENNA 187 MG
2 TABLET ORAL
Qty: 14 | Refills: 0
Start: 2024-09-15 | End: 2024-09-21

## 2024-09-15 RX ORDER — ASPIRIN 81 MG
1 TABLET, DELAYED RELEASE (ENTERIC COATED) ORAL
Qty: 30 | Refills: 0
Start: 2024-09-15 | End: 2024-10-14

## 2024-09-15 RX ORDER — OXYCODONE HYDROCHLORIDE 5 MG/1
1 TABLET ORAL
Qty: 30 | Refills: 0
Start: 2024-09-15 | End: 2024-09-19

## 2024-09-15 RX ADMIN — METOPROLOL TARTRATE 12.5 MILLIGRAM(S): 100 TABLET ORAL at 05:00

## 2024-09-15 RX ADMIN — Medication 500 MILLIGRAM(S): at 05:00

## 2024-09-15 RX ADMIN — CHLORHEXIDINE GLUCONATE 15 MILLILITER(S): 40 SOLUTION TOPICAL at 04:59

## 2024-09-15 RX ADMIN — CHLORHEXIDINE GLUCONATE 1 APPLICATION(S): 40 SOLUTION TOPICAL at 10:45

## 2024-09-15 RX ADMIN — Medication 1 APPLICATION(S): at 04:59

## 2024-09-15 RX ADMIN — Medication 81 MILLIGRAM(S): at 11:36

## 2024-09-15 RX ADMIN — SODIUM CHLORIDE 3 MILLILITER(S): 9 INJECTION INTRAMUSCULAR; INTRAVENOUS; SUBCUTANEOUS at 05:04

## 2024-09-15 RX ADMIN — Medication 5000 UNIT(S): at 05:00

## 2024-09-15 RX ADMIN — Medication 100 MILLIGRAM(S): at 05:00

## 2024-09-15 NOTE — DISCHARGE NOTE PROVIDER - NSDCMRMEDTOKEN_GEN_ALL_CORE_FT
aspirin 81 mg oral delayed release tablet: 1 tab(s) orally once a day  metoprolol succinate 25 mg oral tablet, extended release: 1 tab(s) orally once a day  oxyCODONE 5 mg oral tablet: 1 tab(s) orally every 4 hours as needed for Moderate Pain (4 - 6) MDD: six  senna leaf extract oral tablet: 2 tab(s) orally once a day (at bedtime) as needed for  constipation   aspirin 81 mg oral delayed release tablet: 1 tab(s) orally once a day  Lipitor 20 mg oral tablet: 1 tab(s) orally once a day (at bedtime)  metoprolol succinate 25 mg oral tablet, extended release: 1 tab(s) orally once a day  oxyCODONE 5 mg oral tablet: 1 tab(s) orally every 4 hours as needed for Moderate Pain (4 - 6) MDD: six  senna leaf extract oral tablet: 2 tab(s) orally once a day (at bedtime) as needed for  constipation

## 2024-09-15 NOTE — DISCHARGE NOTE PROVIDER - CARE PROVIDERS DIRECT ADDRESSES
[email or web address],DirectAddress_Unknown,DirectAddress_Unknown

## 2024-09-15 NOTE — DISCHARGE NOTE PROVIDER - HOSPITAL COURSE
48 year old male with PMH of known aortic root dilatation / aortic dilatation increased to 5.6 cm from 4.8 in 2019 and new onset atrial fibrillation hospitalization on 7/2024 was on amiodarone and Eliquis (stopped as per Dr. Serrano as per pt, only on metoprolol), newly  diagnosed hyperthyroidism 7/2024/ Graves Disease not on meds ( asymptomatic/ saw endo 7/30/2024 pending work ups after heart surgery as per pt), high prolactin level 7/2024 followed by Neurology ( pending open air MRI 2/2 claustrophobia and further work up after open heart surgery as per pt), planned for Valve sparing Aortic valve replacement , replacement of Ascending aorta, Transverse Hemiarch on 9/10/2024 w/Dr. Serrano. Patient's outpatient preop labs reveals low TSH, Patient asked to present to the hospital 9/9/24 for direct admission for evaluation prior to the OR 9/10.    9/10 s/p Valve sparing root AA replacement, Encompass ablation, NORIS ligation; extubated in evening  9/11  Neuro c/s for HA -> rec open outpatient MRI 2/2 severe claustrophobia. Endo following for Graves Disease. Endo c/s  -> Free thyroxine 1.0, subclinical as per endo. No Amiodarone 2/2 hyperthyroidism.  EP followed for new onset afib in 07/2024 2/2 hyperthyroidism. No amiodarone 2/2 Hyperthyroid as per EP. EP s/off.   9/12 POD#2. RIJ INTRO D/C in CTU. Transferred to SDU with MED CHEST TUBES X3 -> LWS and (+) PW -> EPM 45/10. On Primacor gtt. Medication regimen maintained.   9/13 DC Meds #1 & #2  Isolate PW  OOB  Reduce Primacor 0.125  Lactate at 2 pm  9/14 DC med  this am  Ambulate Start betablocker  9/15 VSS Pt instructed that Thyroid nodule needs FNA biopsy, can be done outpatient   Subclinical now, not in thyrotoxic state    Repeat thyroid function test in 4-6 weeks  Neurology consulted for HA. HA stable , none now   open MRI outpatient if HA persist, however severely claustrophobic  Discharged home as discussed in multidisciplinary rounds 48 year old male with PMH of known aortic root dilatation / aortic dilatation increased to 5.6 cm from 4.8 in 2019 and new onset atrial fibrillation hospitalization on 7/2024 was on amiodarone and Eliquis (stopped as per Dr. Serrano as per pt, only on metoprolol), newly  diagnosed hyperthyroidism 7/2024/ Graves Disease not on meds ( asymptomatic/ saw endo 7/30/2024 pending work ups after heart surgery as per pt), high prolactin level 7/2024 followed by Neurology ( pending open air MRI 2/2 claustrophobia and further work up after open heart surgery as per pt), planned for Valve sparing Aortic valve replacement , replacement of Ascending aorta, Transverse Hemiarch on 9/10/2024 w/Dr. Serrano. Patient's outpatient preop labs reveals low TSH, Patient asked to present to the hospital 9/9/24 for direct admission for evaluation prior to the OR 9/10.    9/10 s/p Valve sparing root AA replacement, Encompass ablation, NORIS ligation; extubated in evening  9/11  Neuro c/s for HA -> rec open outpatient MRI 2/2 severe claustrophobia. Endo following for Graves Disease. Endo c/s  -> Free thyroxine 1.0, subclinical as per endo. No Amiodarone 2/2 hyperthyroidism.  EP followed for new onset afib in 07/2024 2/2 hyperthyroidism. No amiodarone 2/2 Hyperthyroid as per EP. EP s/off.   9/12 POD#2. RIJ INTRO D/C in CTU. Transferred to SDU with MED CHEST TUBES X3 -> LWS and (+) PW -> EPM 45/10. On Primacor gtt. Medication regimen maintained.   9/13 DC Meds #1 & #2  Isolate PW  OOB  Reduce Primacor 0.125  Lactate at 2 pm  9/14 DC med  this am  Ambulate Start betablocker  9/15 VSS Pt instructed that Thyroid nodule needs FNA biopsy, can be done outpatient   Subclinical now, not in thyrotoxic state      Attending Note:  This is a 48-year-old male h/o new-onset atrial fibrillation this past July, found to be hyperthyroid [TSH <0.01] and found to have >5mm of growth in his known aortic root aneurysm [now 53mm] w/mild to moderate central aortic insufficiency and subsequently underwent a valve-sparing aortic root replacement, replacement of ascending aorta [no transverse hemiarch / circulatory arrest], Encompass ablation and LAAL on 9/10/2024. Only trivial to trace AI following case.       - Mr. Morrissey did exceptionally well. He is ambulating without assistance, hemodynamically stable, remains in sinus rhythm on BB [no Amio given hyperthyroid] and appropriate for discharge. We will see him back in clinic in two weeks.     - He knows that he needs to follow up with Endocrinology and eventually get an FNA of his thyroid, given hyperthyroidism. Does NOT have Graves, all antibody tests are negative.     - Will also eventually need open air MRI given headaches as well as hyperthyroidism       Adam Srerano MD  Cardiac Surgery   48 year old male with PMH of known aortic root dilatation / aortic dilatation increased to 5.6 cm from 4.8 in 2019 and new onset atrial fibrillation hospitalization on 7/2024 was on amiodarone and Eliquis (stopped as per Dr. Serrano as per pt, only on metoprolol), newly  diagnosed hyperthyroidism 7/2024/ Graves Disease not on meds ( asymptomatic/ saw endo 7/30/2024 pending work ups after heart surgery as per pt), high prolactin level 7/2024 followed by Neurology ( pending open air MRI 2/2 claustrophobia and further work up after open heart surgery as per pt), planned for Valve sparing Aortic valve replacement , replacement of Ascending aorta, Transverse Hemiarch on 9/10/2024 w/Dr. Serrano. Patient's outpatient preop labs reveals low TSH, Patient asked to present to the hospital 9/9/24 for direct admission for evaluation prior to the OR 9/10.    9/10 s/p Valve sparing root AA replacement, Encompass ablation, NORIS ligation; extubated in evening  9/11  Neuro c/s for HA -> rec open outpatient MRI 2/2 severe claustrophobia. Endo following for Graves Disease. Endo c/s  -> Free thyroxine 1.0, subclinical as per endo. No Amiodarone 2/2 hyperthyroidism.  EP followed for new onset afib in 07/2024 2/2 hyperthyroidism. No amiodarone 2/2 Hyperthyroid as per EP. EP s/off.   9/12 POD#2. RIJ INTRO D/C in CTU. Transferred to SDU with MED CHEST TUBES X3 -> LWS and (+) PW -> EPM 45/10. On Primacor gtt. Medication regimen maintained.   9/13 DC Meds #1 & #2  Isolate PW  OOB  Reduce Primacor 0.125  Lactate at 2 pm  9/14 DC med  this am  Ambulate Start betablocker  9/15 VSS Pt instructed that Thyroid nodule needs FNA biopsy, can be done outpatient   Subclinical now, not in thyrotoxic state      Attending Note:  This is a 48-year-old male h/o new-onset atrial fibrillation this past July, found to be hyperthyroid [TSH <0.01] and found to have >5mm of growth in his known aortic root aneurysm [now 53mm] w/mild to moderate central aortic insufficiency and subsequently underwent a valve-sparing aortic root replacement, replacement of ascending aorta [no transverse hemiarch / circulatory arrest], Encompass ablation and LAAL on 9/10/2024. Only trivial to trace AI following case.       - Mr. Morrissey did exceptionally well. He is ambulating without assistance, hemodynamically stable, remains in sinus rhythm on BB [no Amio given hyperthyroid] and appropriate for discharge. We will see him back in clinic in two weeks.     - He knows that he needs to follow up with Endocrinology and eventually get an FNA of his thyroid, given hyperthyroidism. Does NOT have Graves, all antibody tests are negative.     - Will also eventually need open air MRI given headaches as well as hyperthyroidism       Adam Serrano MD  Cardiac Surgery        Attending Addendum [10.5.2024]  Diagnosis: Acute systolic heart failure w/mildly reduced right ventricular systolic function and low-normal left ventricular systolic function.       Adam Serrano MD  Cardiac Surgery

## 2024-09-15 NOTE — DISCHARGE NOTE PROVIDER - PROVIDER TOKENS
PROVIDER:[TOKEN:[[id number]],FOLLOWUP:[Routine]],PROVIDER:[TOKEN:[[id number]],FOLLOWUP:[1 month]],PROVIDER:[TOKEN:[[id number]],FOLLOWUP:[Routine]]

## 2024-09-15 NOTE — DISCHARGE NOTE PROVIDER - NSDCFUSCHEDAPPT_GEN_ALL_CORE_FT
Adam Serrano  [facility]  CTSURG [address]  Scheduled Appointment: 10/24/2024

## 2024-09-15 NOTE — PROGRESS NOTE ADULT - ASSESSMENT
48 year old male with PMH of known aortic root dilatation / aortic dilatation increased to 5.6 cm from 4.8 in 2019 and new onset atrial fibrillation hospitalization on 7/2024 , newly  diagnosed subclinical hyperthyroidism 7/2024, now s/p for Valve sparing Aortic valve replacement , replacement of Ascending aorta, Transverse Hemiarch on 9/10/2024 w/Dr. Serrano.       Assessment  Hyperthyroidism: TSH suppressed, no thyroid dz history, Full TFT panel reviewed, Thyroid AB negative, TSI negative.    Free thyroxine came back normal 1.0-1.4 , still  subclinical. Not in thyrotoxic state.   Thyroid US was done last July with solid mass/nodule   < from: US Thyroid (07.16.24 @ 09:26) >  IMPRESSION:  Complex, hypervascular right cystic and solid mass with scattered   internal calcifications measuring 5.2 x 3.4 x 3.6 cm.  TI-RAD 5: Highly suspicious (FNA if > 1 cm, Follow if > 0.5 cm)    Aortic root dilation: on medications, CT surgery eval, monitored.  HTN: on antihypertensive medications, monitored, asymptomatic.      Discussed plan and management wit Dr Tammy Munoz MD  Cell: [phone]  Office: [phone] 48 year old male with PMH of known aortic root dilatation / aortic dilatation increased to 5.6 cm from 4.8 in 2019 and new onset atrial fibrillation hospitalization on 7/2024 , newly  diagnosed subclinical hyperthyroidism 7/2024, now s/p for Valve sparing Aortic valve replacement , replacement of Ascending aorta, Transverse Hemiarch on 9/10/2024 w/Dr. Serrano.         Assessment  Hyperthyroidism: TSH suppressed, no thyroid dz history, Full TFT panel reviewed, Thyroid AB negative, TSI negative.    Free thyroxine came back normal 1.0-1.4 , still  subclinical. Not in thyrotoxic state.   Thyroid US was done last July with solid mass/nodule   < from: US Thyroid (07.16.24 @ 09:26) >  IMPRESSION:  Complex, hypervascular right cystic and solid mass with scattered   internal calcifications measuring 5.2 x 3.4 x 3.6 cm.  TI-RAD 5: Highly suspicious (FNA if > 1 cm, Follow if > 0.5 cm)    Aortic root dilation: on medications, CT surgery eval, monitored.  HTN: on antihypertensive medications, monitored, asymptomatic.      Discussed plan and management wit Dr Tammy Munoz MD  Cell: [phone]  Office: [phone]

## 2024-09-15 NOTE — PROGRESS NOTE ADULT - SUBJECTIVE AND OBJECTIVE BOX
Chief complaint  Patient is a 48y old  Male who presents with a chief complaint of Preop, Low TSH (15 Sep 2024 10:39)         Labs and Fingersticks  CAPILLARY BLOOD GLUCOSE      POCT Blood Glucose.: 115 mg/dL (15 Sep 2024 07:31)  POCT Blood Glucose.: 157 mg/dL (14 Sep 2024 21:32)  POCT Blood Glucose.: 114 mg/dL (14 Sep 2024 16:46)      Anion Gap: 9 (09-15 @ 05:35)  Anion Gap: 9 (09-14 @ 06:57)      Calcium: 8.5 (09-15 @ 05:35)  Calcium: 8.5 (09-14 @ 06:57)  Albumin: 3.2 *L* (09-15 @ 05:35)  Albumin: 3.3 (09-14 @ 06:57)    Alanine Aminotransferase (ALT/SGPT): 39 (09-15 @ 05:35)  Alanine Aminotransferase (ALT/SGPT): 38 (09-14 @ 06:57)  Alkaline Phosphatase: 72 (09-15 @ 05:35)  Alkaline Phosphatase: 69 (09-14 @ 06:57)  Aspartate Aminotransferase (AST/SGOT): 32 (09-15 @ 05:35)  Aspartate Aminotransferase (AST/SGOT): 37 (09-14 @ 06:57)        09-15    133<L>  |  97  |  12  ----------------------------<  118<H>  4.1   |  27  |  0.66    Ca    8.5      15 Sep 2024 05:35  Phos  3.1     09-15  Mg     1.9     09-15    TPro  6.4  /  Alb  3.2<L>  /  TBili  1.3<H>  /  DBili  x   /  AST  32  /  ALT  39  /  AlkPhos  72  09-15                        10.9   13.65 )-----------( 136      ( 15 Sep 2024 05:35 )             32.5     Medications  MEDICATIONS  (STANDING):  aspirin enteric coated 81 milliGRAM(s) Oral daily  bisacodyl Suppository 10 milliGRAM(s) Rectal once  chlorhexidine 0.12% Liquid 15 milliLiter(s) Oral Mucosa every 12 hours  chlorhexidine 4% Liquid 1 Application(s) Topical daily  dextrose 50% Injectable 50 milliLiter(s) IV Push every 15 minutes  dextrose 50% Injectable 25 milliLiter(s) IV Push every 15 minutes  heparin   Injectable 5000 Unit(s) SubCutaneous every 8 hours  insulin lispro (ADMELOG) corrective regimen sliding scale   SubCutaneous Before meals and at bedtime  metoclopramide 5 milliGRAM(s) Oral daily  metoprolol tartrate 12.5 milliGRAM(s) Oral two times a day  polyethylene glycol 3350 17 Gram(s) Oral daily  senna 2 Tablet(s) Oral at bedtime  sodium chloride 0.9% lock flush 3 milliLiter(s) IV Push every 8 hours  sodium chloride 0.9%. 1000 milliLiter(s) (10 mL/Hr) IV Continuous <Continuous>      Physical Exam  General: Patient comfortable in bed   Vital Signs Last 12 Hrs  T(F): 98.2 (09-15-24 @ 12:12), Max: 98.2 (09-15-24 @ 12:12)  HR: 89 (09-15-24 @ 12:12) (85 - 89)  BP: 121/86 (09-15-24 @ 12:12) (109/73 - 121/86)  BP(mean): --  RR: 18 (09-15-24 @ 12:12) (18 - 18)  SpO2: 98% (09-15-24 @ 12:12) (96% - 98%)    CVS: S1S2   Respiratory: No wheezing, no crepitations  GI: Abdomen soft, bowel sounds positive  Musculoskeletal:  moves all extremities  : Voiding     Chief complaint  Patient is a 48y old  Male who presents with a chief complaint of Preop, Low TSH (15 Sep 2024 10:39)         Labs and Fingersticks  CAPILLARY BLOOD GLUCOSE      POCT Blood Glucose.: 115 mg/dL (15 Sep 2024 07:31)  POCT Blood Glucose.: 157 mg/dL (14 Sep 2024 21:32)  POCT Blood Glucose.: 114 mg/dL (14 Sep 2024 16:46)      Anion Gap: 9 (09-15 @ 05:35)  Anion Gap: 9 (09-14 @ 06:57)      Calcium: 8.5 (09-15 @ 05:35)  Calcium: 8.5 (09-14 @ 06:57)  Albumin: 3.2 *L* (09-15 @ 05:35)  Albumin: 3.3 (09-14 @ 06:57)    Alanine Aminotransferase (ALT/SGPT): 39 (09-15 @ 05:35)  Alanine Aminotransferase (ALT/SGPT): 38 (09-14 @ 06:57)  Alkaline Phosphatase: 72 (09-15 @ 05:35)  Alkaline Phosphatase: 69 (09-14 @ 06:57)  Aspartate Aminotransferase (AST/SGOT): 32 (09-15 @ 05:35)  Aspartate Aminotransferase (AST/SGOT): 37 (09-14 @ 06:57)        09-15    133<L>  |  97  |  12  ----------------------------<  118<H>  4.1   |  27  |  0.66    Ca    8.5      15 Sep 2024 05:35  Phos  3.1     09-15  Mg     1.9     09-15    TPro  6.4  /  Alb  3.2<L>  /  TBili  1.3<H>  /  DBili  x   /  AST  32  /  ALT  39  /  AlkPhos  72  09-15                        10.9   13.65 )-----------( 136      ( 15 Sep 2024 05:35 )             32.5     Medications  MEDICATIONS  (STANDING):  aspirin enteric coated 81 milliGRAM(s) Oral daily  bisacodyl Suppository 10 milliGRAM(s) Rectal once  chlorhexidine 0.12% Liquid 15 milliLiter(s) Oral Mucosa every 12 hours  chlorhexidine 4% Liquid 1 Application(s) Topical daily  dextrose 50% Injectable 50 milliLiter(s) IV Push every 15 minutes  dextrose 50% Injectable 25 milliLiter(s) IV Push every 15 minutes  heparin   Injectable 5000 Unit(s) SubCutaneous every 8 hours  insulin lispro (ADMELOG) corrective regimen sliding scale   SubCutaneous Before meals and at bedtime  metoclopramide 5 milliGRAM(s) Oral daily  metoprolol tartrate 12.5 milliGRAM(s) Oral two times a day  polyethylene glycol 3350 17 Gram(s) Oral daily  senna 2 Tablet(s) Oral at bedtime  sodium chloride 0.9% lock flush 3 milliLiter(s) IV Push every 8 hours  sodium chloride 0.9%. 1000 milliLiter(s) (10 mL/Hr) IV Continuous <Continuous>      Physical Exam  General: Patient comfortable in bed   Vital Signs Last 12 Hrs  T(F): 98.2 (09-15-24 @ 12:12), Max: 98.2 (09-15-24 @ 12:12)  HR: 89 (09-15-24 @ 12:12) (85 - 89)  BP: 121/86 (09-15-24 @ 12:12) (109/73 - 121/86)  BP(mean): --  RR: 18 (09-15-24 @ 12:12) (18 - 18)  SpO2: 98% (09-15-24 @ 12:12) (96% - 98%)    CVS: S1S2   Respiratory: No wheezing, no crepitations  GI: Abdomen soft, bowel sounds positive  Musculoskeletal:  moves all extremities  : Voiding

## 2024-09-15 NOTE — DISCHARGE NOTE PROVIDER - NSDCFUADDINST_GEN_ALL_CORE_FT
Resume activity as tolerated  Resume low cholesterol low sodium diet  Shower daily and wash all incisions with soap and water    Please contact Cardiothoracic surgery office if you have fever, observe increased redness, drainage from surgical sites or pain unmanaged by pain medications.  Ambulate at least four times daily  Use incentive spirometer every hour during the day  Record daily weight and report changes greater than 3lbs  Please follow up with your cardiologist in 7-10 days  Please follow up with Dr Serrano in 7-10 days   Postoperative nosocomial infection after open AAA repair is significantly more likely to lead to serious subsequent complications  Avoid Fluoroquinolones, a class of antibiotics, have been linked to an increased risk of aortic aneurysm and aortic dissection. The FDA has issued warnings about this risk, and patients should seek immediate medical attention if they experience sudden, severe pain in the abdomen, chest, or lower back while taking fluoroquinolones Resume activity as tolerated  Resume low cholesterol low sodium diet  Shower daily and wash all incisions with soap and water    Please contact Cardiothoracic surgery office if you have fever, observe increased redness, drainage from surgical sites or pain unmanaged by pain medications.  Ambulate at least four times daily  Use incentive spirometer every hour during the day  Record daily weight and report changes greater than 3lbs  Please follow up with your cardiologist in 7-10 days  Please follow up with Dr Serrano in 7-10 days   Postoperative nosocomial infection after open AAA repair is significantly more likely to lead to serious subsequent complications  Avoid Fluoroquinolones, a class of antibiotics, have been linked to an increased risk of aortic aneurysm and aortic dissection. The FDA has issued warnings about this risk, and patients should seek immediate medical attention if they experience sudden, severe pain in the abdomen, chest, or lower back while taking fluoroquinolones  Recommend prophylactic antibiotics prior to dental or invasive procedures due to risk of infection

## 2024-09-15 NOTE — DISCHARGE NOTE PROVIDER - NSDCPNSUBOBJ_GEN_ALL_CORE
VITAL SIGNS    Telemetry: SR 70-80   Vital Signs Last 24 Hrs  T(C): 36.7 (09-15-24 @ 04:22), Max: 37.8 (24 @ 19:35)  T(F): 98.1 (09-15-24 @ 04:22), Max: 100 (24 @ 19:35)  HR: 85 (09-15-24 @ 04:22) (83 - 86)  BP: 109/73 (09-15-24 @ 04:22) (109/73 - 130/74)  RR: 18 (09-15-24 @ 04:22) (18 - 18)  SpO2: 96% (09-15-24 @ 04:22) (95% - 96%)             @ 07:01  -  09-15 @ 07:00  --------------------------------------------------------  IN: 930 mL / OUT: 2770 mL / NET: -1840 mL    09-15 @ 07:01  -  09-15 @ 11:27  --------------------------------------------------------  IN: 360 mL / OUT: 300 mL / NET: 60 mL       Daily     Daily Weight in k.9 (15 Sep 2024 08:00)  Admit Wt: Drug Dosing Weight  Height (cm): 198.1 (10 Sep 2024 08:23)  Weight (kg): 117.1 (10 Sep 2024 08:23)  BMI (kg/m2): 29.8 (10 Sep 2024 08:23)  BSA (m2): 2.52 (10 Sep 2024 08:23)    Bilirubin Total: 1.3 mg/dL (09-15 @ 05:35)    CAPILLARY BLOOD GLUCOSE      POCT Blood Glucose.: 115 mg/dL (15 Sep 2024 07:31)  POCT Blood Glucose.: 157 mg/dL (14 Sep 2024 21:32)  POCT Blood Glucose.: 114 mg/dL (14 Sep 2024 16:46)          MEDICATIONS  acetaminophen     Tablet .. 650 milliGRAM(s) Oral every 6 hours PRN  aspirin enteric coated 81 milliGRAM(s) Oral daily  bisacodyl Suppository 10 milliGRAM(s) Rectal once  chlorhexidine 0.12% Liquid 15 milliLiter(s) Oral Mucosa every 12 hours  chlorhexidine 4% Liquid 1 Application(s) Topical daily  dextrose 50% Injectable 25 milliLiter(s) IV Push every 15 minutes  dextrose 50% Injectable 50 milliLiter(s) IV Push every 15 minutes  gabapentin 100 milliGRAM(s) Oral every 8 hours  heparin   Injectable 5000 Unit(s) SubCutaneous every 8 hours  insulin lispro (ADMELOG) corrective regimen sliding scale   SubCutaneous Before meals and at bedtime  metoclopramide 5 milliGRAM(s) Oral daily  metoprolol tartrate 12.5 milliGRAM(s) Oral two times a day  oxyCODONE    IR 10 milliGRAM(s) Oral every 4 hours PRN  oxyCODONE    IR 5 milliGRAM(s) Oral every 4 hours PRN  polyethylene glycol 3350 17 Gram(s) Oral daily  senna 2 Tablet(s) Oral at bedtime  sodium chloride 0.9% lock flush 3 milliLiter(s) IV Push every 8 hours  sodium chloride 0.9%. 1000 milliLiter(s) IV Continuous <Continuous>      >>> <<<  PHYSICAL EXAM  Subjective: NAD  Neurology: alert and oriented x 3, nonfocal, no gross deficits  CV : s1s2  Sternal Wound :  CDI , Stable  Lungs: CTA  Abdomen: soft, NT,ND, ( +)BM  :  voiding  Extremities:  -c/c/e     LABS  09-15    133<L>  |  97  |  12  ----------------------------<  118<H>  4.1   |  27  |  0.66    Ca    8.5      15 Sep 2024 05:35  Phos  3.1     09-15  Mg     1.9     09-15    TPro  6.4  /  Alb  3.2<L>  /  TBili  1.3<H>  /  DBili  x   /  AST  32  /  ALT  39  /  AlkPhos  72  09-15                                 10.9   13.65 )-----------( 136      ( 15 Sep 2024 05:35 )             32.5                 PAST MEDICAL & SURGICAL HISTORY:  Dilated aortic root      Marijuana use      History of prediabetes      Paroxysmal atrial fibrillation      Mild asthma      Hyperthyroidism      Thyroid nodule      Elevated prolactin level      Graves disease      H/O cardiomegaly      Enlarged thyroid gland      Thoracic aortic aneurysm      H/O wisdom tooth extraction

## 2024-09-15 NOTE — PROGRESS NOTE ADULT - PROVIDER SPECIALTY LIST ADULT
Endocrinology
Neurology
Neurology
CT Surgery
Critical Care
Neurology
CT Surgery
Endocrinology
CT Surgery
Endocrinology
Endocrinology

## 2024-09-15 NOTE — PROGRESS NOTE ADULT - NS ATTEND AMEND GEN_ALL_CORE FT
Chart, labs, vitals, radiology reviewed. Above H&P reviewed and edited where appropriate. Agree with history and physical exam. Agree with assessment and plan. I reviewed the overnight course of events and discussed the care with the patient/ family. All the decisions in assessment and plan are made by me.

## 2024-09-15 NOTE — DISCHARGE NOTE NURSING/CASE MANAGEMENT/SOCIAL WORK - NSDCPEFALRISK_GEN_ALL_CORE
For information on Fall & Injury Prevention, visit: [email or web address] OR  [email or web address] OR  https://www.cdc.gov/steadi/patient.html

## 2024-09-15 NOTE — DISCHARGE NOTE NURSING/CASE MANAGEMENT/SOCIAL WORK - PATIENT PORTAL LINK FT
You can access the FollowMyHealth Patient Portal offered by [facility] by registering at the following website: [email or web address]. By joining [facility]’s FollowMyHealth portal, you will also be able to view your health information using other applications (apps) compatible with our system.

## 2024-09-15 NOTE — DISCHARGE NOTE PROVIDER - NSDCCPTREATMENT_GEN_ALL_CORE_FT
PRINCIPAL PROCEDURE  Procedure: Aortic root replacement  Findings and Treatment: valve sparing      SECONDARY PROCEDURE  Procedure: Isolation of pulmonary vein  Findings and Treatment:     Procedure: Clipping, left atrial appendage  Findings and Treatment:

## 2024-09-15 NOTE — PROGRESS NOTE ADULT - ASSESSMENT
48 year old male with known aortic root dilatation / aortic dilatation increased to 5.6 cm from 4.8 in 2019 and new onset atrial fibrillation hospitalization on 7/2024 was on amiodarone and Eliquis (stopped as per Dr. Serrano as per pt, only on metoprolol), newly  diagnosed hyperthyroidism 7/2024/ Graves Disease not on meds ( asymptomatic/ saw endo 7/30/2024 pending work ups after heart surgery as per pt), high prolactin level 7/2024 followed by Neurology ( pending open air MRI 2/2 claustrophobia and further work up after open heart surgery as per pt), planned for Valve sparing Aortic valve replacement , replacement of Ascending aorta, Transverse Hemiarch on 9/10/2024 w/Dr. Serrano. Patient's outpatient preop labs reveals low TSH, Patient asked to present to the hospital 9/9/24 for direct admission for evaluation prior to the OR 9/10.    7/14 ECHO- 2. Aortic root at the sinuses of Valsalva is aneurysmal, measuring 5.10 cm (indexed 2.05 cm/m²). Ascending aorta diameter is aneurysmal, measuring 4.80 cm   A1c 6.4  TSH low   elevated prolactin   S/p valve sparing root Hemiarch replacement 9/10    9/10 extubated in evening   post op exam non focal, walked on unit     Impression:   1) HA stable , none now   2) elevated prolactin    - open MRI outpatient. severely claustrophobic   - f/u endocrine   - s/p amio  - on gabapentin 100 TID   - ASA for now.  eventually doac for AF?    - telemetry  - PT/OT/SS/SLP, OOBC  - check FS, glucose control <180  - GI/DVT ppx  - Thank you for allowing me to participate in the care of this patient. Call with questions.   - remaining per CTS/.cardio team   - outpatient f/u on discharge      Patricio Mina MD  Vascular Neurology  Office: [phone]

## 2024-09-15 NOTE — PROGRESS NOTE ADULT - REASON FOR ADMISSION
Preop, Low TSH
Valve sparing root AA replacement
Asc aortic repair
Preop, Low TSH
Preop, Low TSH

## 2024-09-15 NOTE — PROGRESS NOTE ADULT - SUBJECTIVE AND OBJECTIVE BOX
Neurology      S: patient seen ;  doing well       Medications: MEDICATIONS  (STANDING):  aspirin enteric coated 81 milliGRAM(s) Oral daily  bisacodyl Suppository 10 milliGRAM(s) Rectal once  chlorhexidine 0.12% Liquid 15 milliLiter(s) Oral Mucosa every 12 hours  chlorhexidine 4% Liquid 1 Application(s) Topical daily  dextrose 50% Injectable 25 milliLiter(s) IV Push every 15 minutes  dextrose 50% Injectable 50 milliLiter(s) IV Push every 15 minutes  gabapentin 100 milliGRAM(s) Oral every 8 hours  heparin   Injectable 5000 Unit(s) SubCutaneous every 8 hours  insulin lispro (ADMELOG) corrective regimen sliding scale   SubCutaneous Before meals and at bedtime  metoclopramide 5 milliGRAM(s) Oral daily  metoprolol tartrate 12.5 milliGRAM(s) Oral two times a day  polyethylene glycol 3350 17 Gram(s) Oral daily  senna 2 Tablet(s) Oral at bedtime  sodium chloride 0.9% lock flush 3 milliLiter(s) IV Push every 8 hours  sodium chloride 0.9%. 1000 milliLiter(s) (10 mL/Hr) IV Continuous <Continuous>    MEDICATIONS  (PRN):  acetaminophen     Tablet .. 650 milliGRAM(s) Oral every 6 hours PRN Mild Pain (1 - 3)  oxyCODONE    IR 5 milliGRAM(s) Oral every 4 hours PRN Moderate Pain (4 - 6)  oxyCODONE    IR 10 milliGRAM(s) Oral every 4 hours PRN Severe Pain (7 - 10)       Vitals:  Vital Signs Last 24 Hrs  T(C): 36.7 (15 Sep 2024 04:22), Max: 37.8 (14 Sep 2024 19:35)  T(F): 98.1 (15 Sep 2024 04:22), Max: 100 (14 Sep 2024 19:35)  HR: 85 (15 Sep 2024 04:22) (83 - 86)  BP: 109/73 (15 Sep 2024 04:22) (109/73 - 130/74)  BP(mean): 96 (14 Sep 2024 15:48) (96 - 96)  RR: 18 (15 Sep 2024 04:22) (18 - 18)  SpO2: 96% (15 Sep 2024 04:22) (95% - 96%)    Parameters below as of 15 Sep 2024 04:22  Patient On (Oxygen Delivery Method): room air            General Exam:   General Appearance: Appropriately dressed and in no acute distress       Head: Normocephalic, atraumatic and no dysmorphic features  Ear, Nose, and Throat: Moist mucous membranes  CVS: S1S2+  Resp: No SOB, no wheeze or rhonchi  GI: soft NT/ND  Extremities: No edema or cyanosis  Skin: No bruises or rashes     Neurological Exam:  Mental Status: Awake, alert and oriented x 3.  Able to follow simple and complex verbal commands. Able to name and repeat. fluent speech. No obvious aphasia or dysarthria noted.   Cranial Nerves: PERRL, EOMI, VFFC, sensation V1-V3 intact,  no obvious facial asymmetry, equal elevation of palate, scm/trap 5/5, tongue is midline on protrusion. no obvious papilledema on fundoscopic exam. hearing is grossly intact.   Motor: Normal bulk, tone and strength throughout. Fine finger movements were intact and symmetric. no tremors or drift noted.    Sensation: Intact to light touch and pinprick throughout. no right/left confusion. no extinction to tactile on DSS.    Reflexes: 1+ throughout at biceps, brachioradialis, triceps, patellars and ankles bilaterally and equal. No clonus. R toe and L toe were both downgoing.  Coordination: No dysmetria on FNF or HKS  Gait:  walked on unit     Data/Labs/Imaging which I personally reviewed.       LABS:                          10.9   13.65 )-----------( 136      ( 15 Sep 2024 05:35 )             32.5     09-15    133<L>  |  97  |  12  ----------------------------<  118<H>  4.1   |  27  |  0.66    Ca    8.5      15 Sep 2024 05:35  Phos  3.1     09-15  Mg     1.9     09-15    TPro  6.4  /  Alb  3.2<L>  /  TBili  1.3<H>  /  DBili  x   /  AST  32  /  ALT  39  /  AlkPhos  72  09-15    LIVER FUNCTIONS - ( 15 Sep 2024 05:35 )  Alb: 3.2 g/dL / Pro: 6.4 g/dL / ALK PHOS: 72 U/L / ALT: 39 U/L / AST: 32 U/L / GGT: x             Urinalysis Basic - ( 15 Sep 2024 05:35 )    Color: x / Appearance: x / SG: x / pH: x  Gluc: 118 mg/dL / Ketone: x  / Bili: x / Urobili: x   Blood: x / Protein: x / Nitrite: x   Leuk Esterase: x / RBC: x / WBC x   Sq Epi: x / Non Sq Epi: x / Bacteria: x

## 2024-09-15 NOTE — DISCHARGE NOTE PROVIDER - CARE PROVIDER_API CALL
Adam Serrano  Thoracic Surgery  [address] [place] [address]  Phone: [phone]  Fax: [phone]  Follow Up Time: Routine    Lana Munoz  Follow Up Time: 1 month    Patricio Mina  Neurology  [address] [facility], [address]  [place] [address]  Phone: [phone]  Fax: [phone]  Follow Up Time: Routine

## 2024-09-15 NOTE — PROGRESS NOTE ADULT - PROBLEM SELECTOR PLAN 2
S/p surgery, on medications, monitoring, care per primary/surgical team.

## 2024-09-15 NOTE — DISCHARGE NOTE PROVIDER - NSDCCPCAREPLAN_GEN_ALL_CORE_FT
PRINCIPAL DISCHARGE DIAGNOSIS  Diagnosis: Aortic root aneurysm  Assessment and Plan of Treatment:       SECONDARY DISCHARGE DIAGNOSES  Diagnosis: Paroxysmal atrial fibrillation  Assessment and Plan of Treatment:

## 2024-09-15 NOTE — PROGRESS NOTE ADULT - PROBLEM SELECTOR PROBLEM 1
Hyperthyroidism
Aortic root aneurysm
Hyperthyroidism

## 2024-09-15 NOTE — PROGRESS NOTE ADULT - PROBLEM SELECTOR PLAN 1
S/p valve sparing root Hemiarch replacement 9/10   Continue with ASA  Hx of Afib No amiodarone 2/2 hyperthyroidism as per EP  Wean Primacor gtt  On pain regimen  On bowel regimen  Isolate  PW  Maintain MED Chest tubes #1 & #2  . Monitor chest tube outputs.   Increase activity as tolerated.   Encourage Chest PT / Pulmonary toileting and Incentive spirometry every 1 hour x 10 while awake.   Continue with PUD and DVT prophylaxis.   Shower on POD #5.   Plan of care discussed with CTS Attending
S/p valve sparing root Hemiarch replacement 9/10   Continue with ASA  Hx of Afib No amiodarone 2/2 hyperthyroidism as per EP  On Primacor gtt  On pain regimen  On bowel regimen  Maintain PW  Maintain MED Chest tubes x3. Monitor chest tube outputs.   Increase activity as tolerated.   Encourage Chest PT / Pulmonary toileting and Incentive spirometry every 1 hour x 10 while awake.   Continue with PUD and DVT prophylaxis.   Shower on POD #5.   Plan of care discussed with CTS Attending
Suggest Outpatient thyroid uptake and scan for thyroid nodules  Thyroid nodule needs FNA biopsy, can be done outpatient   Subclinical now, not in thyrotoxic state, no objection if amiodarone if needed  ?unsure why TSH supressed    Suggest to repeat thyroid function test in 4-6 weeks. Outpatient follow up.  Headaches being worked up by neurology team/CTS.
S/p valve sparing root Hemiarch replacement 9/10   Continue with ASA  Hx of Afib No amiodarone 2/2 hyperthyroidism as per EP  Start betablocker  On pain regimen  On bowel regimen  DC  MED  Increase activity as tolerated.   Encourage Chest PT / Pulmonary toileting and Incentive spirometry every 1 hour x 10 while awake.   Continue with PUD and DVT prophylaxis.   Shower on POD #5.   Plan of care discussed with CTS Attending
Suggest Outpatient thyroid uptake and scan for thyroid nodules  Thyroid nodule needs FNA biopsy, can be done outpatient   Subclinical now, Suggest to repeat thyroid function test in 4-6 weeks. Outpatient follow up.  Headaches being worked up by neurology team/CTS.
Suggest Outpatient thyroid uptake and scan for thyroid nodules  Thyroid nodule needs FNA biopsy, can be done outpatient   Subclinical now, not in thyrotoxic state, no objection if amiodarone if needed  ?unsure why TSH supressed    Suggest to repeat thyroid function test in 4-6 weeks. Outpatient follow up.  Headaches being worked up by neurology team/CTS.
Suggest Outpatient thyroid uptake and scan for thyroid nodules  Thyroid nodule needs FNA biopsy, can be done outpatient   Subclinical now, not in thyrotoxic state, no objection if amiodarone if needed  ?unsure why TSH supressed    Suggest to repeat thyroid function test in 4-6 weeks. Outpatient follow up.  Headaches being worked up by neurology team/CTS.

## 2024-09-16 RX ORDER — ATORVASTATIN CALCIUM 20 MG/1
20 TABLET, FILM COATED ORAL
Refills: 0 | Status: ACTIVE | COMMUNITY

## 2024-09-16 RX ORDER — SENNOSIDES 8.6 MG/1
8.6 CAPSULE, GELATIN COATED ORAL
Refills: 0 | Status: ACTIVE | COMMUNITY

## 2024-09-16 RX ORDER — OXYCODONE HYDROCHLORIDE 5 MG/1
5 CAPSULE ORAL EVERY 6 HOURS
Qty: 20 | Refills: 0 | Status: ACTIVE | COMMUNITY

## 2024-09-16 RX ORDER — METOPROLOL SUCCINATE 25 MG/1
25 TABLET, EXTENDED RELEASE ORAL DAILY
Refills: 0 | Status: ACTIVE | COMMUNITY

## 2024-09-25 PROBLEM — Z98.890 S/P AORTIC ANEURYSM REPAIR: Status: ACTIVE | Noted: 2024-09-25

## 2024-09-25 PROBLEM — Z09 POSTOPERATIVE FOLLOW-UP: Status: ACTIVE | Noted: 2024-09-25

## 2024-09-26 ENCOUNTER — APPOINTMENT (OUTPATIENT)
Dept: CARDIOTHORACIC SURGERY | Facility: CLINIC | Age: 49
End: 2024-09-26
Payer: COMMERCIAL

## 2024-09-26 VITALS
BODY MASS INDEX: 28 KG/M2 | RESPIRATION RATE: 14 BRPM | SYSTOLIC BLOOD PRESSURE: 127 MMHG | OXYGEN SATURATION: 98 % | HEIGHT: 78 IN | DIASTOLIC BLOOD PRESSURE: 76 MMHG | TEMPERATURE: 98.4 F | HEART RATE: 81 BPM | WEIGHT: 242 LBS

## 2024-09-26 DIAGNOSIS — Z98.890 OTHER SPECIFIED POSTPROCEDURAL STATES: ICD-10-CM

## 2024-09-26 DIAGNOSIS — Z09 ENCOUNTER FOR FOLLOW-UP EXAMINATION AFTER COMPLETED TREATMENT FOR CONDITIONS OTHER THAN MALIGNANT NEOPLASM: ICD-10-CM

## 2024-09-26 DIAGNOSIS — Z86.79 OTHER SPECIFIED POSTPROCEDURAL STATES: ICD-10-CM

## 2024-09-26 PROCEDURE — 99024 POSTOP FOLLOW-UP VISIT: CPT

## 2024-09-27 NOTE — CONSULT LETTER
[Dear  ___] : Dear  [unfilled], [Courtesy Letter:] : I had the pleasure of seeing your patient, [unfilled], in my office today. [Please see my note below.] : Please see my note below. [Consult Closing:] : Thank you very much for allowing me to participate in the care of this patient.  If you have any questions, please do not hesitate to contact me. [Sincerely,] : Sincerely, [FreeTextEntry3] :  Dr. Adam Serrano Attending Surgeon Department of Cardiovascular and Thoracic surgery [address] [place] [address] Tel: [phone]

## 2024-09-27 NOTE — REASON FOR VISIT
[de-identified] :  Valve-sparing aortic root replacement (Alberto V procedure) using 28mm Terumo Valsalva graft with direct reimplantation of coronary buttons for aortic root  aneurysm               Pulmonary vein isolation, left atrial 'Box lesion' using the Atricure EnCompass clamp               Left atrial appendage exclusion using Atricure 45mm AtriClip [de-identified] : 9/10/24

## 2024-09-27 NOTE — ASSESSMENT
[FreeTextEntry1] : 48 year old male with PMH of known aortic root dilatation / aortic dilatation Increased to 5.6 cm from 4.8 in 2019 and new onset atrial fibrillation hospitalization on 7/2024 was on amiodarone and Eliquis (stopped as per Dr. Serrano as per pt, only on metoprolol), newly  diagnosed hyperthyroidism 7/2024/ Graves Disease not on meds ( asymptomatic/ saw endo 7/30/2024 pending work ups after heart surgery as per pt), high prolactin level 7/2024 followed by Neurology ( pending open air MRI 2/2 claustrophobia and further work up after open heart surgery as per pt), planned for Valve sparing Aortic valve replacement , replacement of Ascending aorta, Transverse Hemiarch on 9/10/2024 w/Dr. Serrano.    9/10 s/p Valve sparing root AA replacement, Encompass ablation, NORIS ligation. 9/11  Neuro c/s for HA -> rec open outpatient MRI 2/2 severe claustrophobia. Endo following for Graves Disease. Endo c/s  -> Free thyroxine 1.0, subclinical as per endo. No Amiodarone 2/2 hyperthyroidism.  EP followed for new onset afib in 07/2024 2/2 hyperthyroidism. No amiodarone 2/2 Hyperthyroid as per EP.  On Primacor gtt. Medication regimen maintained. Start betablocker, 9/15  Pt instructed that Thyroid nodule needs FNA biopsy, can be done outpatient Subclinical now, not in thyrotoxic state  Today he presents and reports that he has shortness of breath with walking. Denies any chest pain, palpitations,, Dizziness or pedal edema.    Today on exam bilateral lung fields are clear, no wheezing or rales, no use of accessory muscles noted or respiratory distress, normal sinus rhythm, sternum stable, incision clean, dry and intact. No peripheral edema noted. Sutures removed today.    Instructed patient on importance of optimal glycemic control, daily showering, daily weights, any signs of fever (temperature greater than 101F, chills,  incentive spirometer use, and increase ambulation as tolerated. Instructed to call office with any signs or symptoms of infection or weight gain of 2 or more pounds in 1 day or 3 or more pounds in 1 week.    Discussed intake of plant based foods, including vegetables, fruits, and whole grain foods: legumes, nuts and seeds, fish or seafood, lean meats, and non-fat or low-fat diary foods. Plant based oils (non-tropical) in place of solid fats. Instructed patient to limit intake of high fat meats and processed meats, high-fat diary foods, dietary cholesterol and sodium, foods and beverages with added sugars.  *********************************************************************************************************************************************************************************************************************************************************************************************************************************************************************************************************   This is a 48-year-old male h/o new-onset atrial fibrillation this past July, found to be hyperthyroid [TSH <0.01] and found to have >5mm of growth in his known aortic root aneurysm [now 53mm] w/mild to moderate central  aortic insufficiency who underwent an aortic root replacement [Alberto V procedure], Encompass ablation, LAAL [Atricure 45mm clip] on 9/10/2024. He did extremely well w/an uneventful post-operative course and was discharged on POD 5 [9/15/2024].   - We are extremely pleased with the functional recovery of Mr. Morrissey. His exercise capacity has continued to improve, his pain has almost resolved completely, and his appetite is gradually improving. His incision look pristine. He is breathing well at rest and on exertion. He has no murmur and no lower extremity edema. He also remains in sinus rhythm.  1. We will see Mr. Morrissey back in clinic in one month for a follow up visit then we will see him back with a CTA of the chest / abdomen / pelvis in 6 months. If stable, he will enter into our Aortic Surveillance Registry and be seen yearly thereafter with a CTA of the chest.  2. Mr. Morrissey just moved to [place], although will still be working in the [place] area. He knows that he needs to establish care via a PCP and Cardiologist in [place]. However, we will continue to get a TTE to monitor his valve annually as well as the CTA of his chest during our routine visits to limit his trips to once per year.   3. He has remained in sinus rhythm since his initial hospitalization in July; his LAAL is clipped and he has been ablated, I think it is safe that he remains of anticoagulation. Mr. Morrissey agrees with this plan.   4. He should continue to increase his activity and walk daily as tolerated. Continue to use incentive spirometer.  5. He knows to call our office if he experiences any experience fever, fatigue, dizziness, confusion, syncope, shortness of breath, chest pain not relieved with analgesics, increased redness/drainage from the surgical incision site

## 2024-09-27 NOTE — CONSULT LETTER
[Dear  ___] : Dear  [unfilled], [Courtesy Letter:] : I had the pleasure of seeing your patient, [unfilled], in my office today. [Please see my note below.] : Please see my note below. [Consult Closing:] : Thank you very much for allowing me to participate in the care of this patient.  If you have any questions, please do not hesitate to contact me. [Sincerely,] : Sincerely, [FreeTextEntry3] :  Dr. Adam Serrano Attending Surgeon Department of Cardiovascular and Thoracic surgery [address] [place] [address] Tel: [phone]

## 2024-09-27 NOTE — REASON FOR VISIT
[de-identified] :  Valve-sparing aortic root replacement (Alberto V procedure) using 28mm Terumo Valsalva graft with direct reimplantation of coronary buttons for aortic root  aneurysm               Pulmonary vein isolation, left atrial 'Box lesion' using the Atricure EnCompass clamp               Left atrial appendage exclusion using Atricure 45mm AtriClip [de-identified] : 9/10/24

## 2024-09-27 NOTE — ASSESSMENT
[FreeTextEntry1] : 48 year old male with PMH of known aortic root dilatation / aortic dilatation Increased to 5.6 cm from 4.8 in 2019 and new onset atrial fibrillation hospitalization on 7/2024 was on amiodarone and Eliquis (stopped as per Dr. Serrano as per pt, only on metoprolol), newly  diagnosed hyperthyroidism 7/2024/ Graves Disease not on meds ( asymptomatic/ saw endo 7/30/2024 pending work ups after heart surgery as per pt), high prolactin level 7/2024 followed by Neurology ( pending open air MRI 2/2 claustrophobia and further work up after open heart surgery as per pt), planned for Valve sparing Aortic valve replacement , replacement of Ascending aorta, Transverse Hemiarch on 9/10/2024 w/Dr. Serrano.    9/10 s/p Valve sparing root AA replacement, Encompass ablation, NORIS ligation. 9/11  Neuro c/s for HA -> rec open outpatient MRI 2/2 severe claustrophobia. Endo following for Graves Disease. Endo c/s  -> Free thyroxine 1.0, subclinical as per endo. No Amiodarone 2/2 hyperthyroidism.  EP followed for new onset afib in 07/2024 2/2 hyperthyroidism. No amiodarone 2/2 Hyperthyroid as per EP.  On Primacor gtt. Medication regimen maintained. Start betablocker, 9/15  Pt instructed that Thyroid nodule needs FNA biopsy, can be done outpatient Subclinical now, not in thyrotoxic state  Today he presents and reports that he has shortness of breath with walking. Denies any chest pain, palpitations,, Dizziness or pedal edema.    Today on exam bilateral lung fields are clear, no wheezing or rales, no use of accessory muscles noted or respiratory distress, normal sinus rhythm, sternum stable, incision clean, dry and intact. No peripheral edema noted. Sutures removed today.    Instructed patient on importance of optimal glycemic control, daily showering, daily weights, any signs of fever (temperature greater than 101F, chills,  incentive spirometer use, and increase ambulation as tolerated. Instructed to call office with any signs or symptoms of infection or weight gain of 2 or more pounds in 1 day or 3 or more pounds in 1 week.    Discussed intake of plant based foods, including vegetables, fruits, and whole grain foods: legumes, nuts and seeds, fish or seafood, lean meats, and non-fat or low-fat diary foods. Plant based oils (non-tropical) in place of solid fats. Instructed patient to limit intake of high fat meats and processed meats, high-fat diary foods, dietary cholesterol and sodium, foods and beverages with added sugars.  *********************************************************************************************************************************************************************************************************************************************************************************************************************************************************************************************************   This is a 48-year-old male h/o new-onset atrial fibrillation this past July, found to be hyperthyroid [TSH <0.01] and found to have >5mm of growth in his known aortic root aneurysm [now 53mm] w/mild to moderate central  aortic insufficiency who underwent an aortic root replacement [Alberto V procedure], Encompass ablation, LAAL [Atricure 45mm clip] on 9/10/2024. He did extremely well w/an uneventful post-operative course and was discharged on POD 5 [9/15/2024].   - We are extremely pleased with the functional recovery of Mr. Morrissey. His exercise capacity has continued to improve, his pain has almost resolved completely, and his appetite is gradually improving. His incision look pristine. He is breathing well at rest and on exertion. He has no murmur and no lower extremity edema. He also remains in sinus rhythm.  1. We will see Mr. Morrissey back in clinic in one month for a follow up visit then we will see him back with a CTA of the chest / abdomen / pelvis in 6 months. If stable, he will enter into our Aortic Surveillance Registry and be seen yearly thereafter with a CTA of the chest.  2. Mr. Morrissey just moved to [place], although will still be working in the [place] area. He knows that he needs to establish care via a PCP and Cardiologist in [place]. However, we will continue to get a TTE to monitor his valve annually as well as the CTA of his chest during our routine visits to limit his trips to once per year.   3. He has remained in sinus rhythm since his initial hospitalization in July; his LAAL is clipped and he has been ablated, I think it is safe that he remains of anticoagulation. Mr. Morrissey agrees with this plan.   4. He should continue to increase his activity and walk daily as tolerated. Continue to use incentive spirometer.  5. He knows to call our office if he experiences any experience fever, fatigue, dizziness, confusion, syncope, shortness of breath, chest pain not relieved with analgesics, increased redness/drainage from the surgical incision site

## 2024-09-27 NOTE — REASON FOR VISIT
[de-identified] :  Valve-sparing aortic root replacement (Alberto V procedure) using 28mm Terumo Valsalva graft with direct reimplantation of coronary buttons for aortic root  aneurysm               Pulmonary vein isolation, left atrial 'Box lesion' using the Atricure EnCompass clamp               Left atrial appendage exclusion using Atricure 45mm AtriClip [de-identified] : 9/10/24

## 2024-09-27 NOTE — ASSESSMENT
[FreeTextEntry1] : 48 year old male with PMH of known aortic root dilatation / aortic dilatation Increased to 5.6 cm from 4.8 in 2019 and new onset atrial fibrillation hospitalization on 7/2024 was on amiodarone and Eliquis (stopped as per Dr. Serrano as per pt, only on metoprolol), newly  diagnosed hyperthyroidism 7/2024/ Graves Disease not on meds ( asymptomatic/ saw endo 7/30/2024 pending work ups after heart surgery as per pt), high prolactin level 7/2024 followed by Neurology ( pending open air MRI 2/2 claustrophobia and further work up after open heart surgery as per pt), planned for Valve sparing Aortic valve replacement , replacement of Ascending aorta, Transverse Hemiarch on 9/10/2024 w/Dr. Serrano.    9/10 s/p Valve sparing root AA replacement, Encompass ablation, NORIS ligation. 9/11  Neuro c/s for HA -> rec open outpatient MRI 2/2 severe claustrophobia. Endo following for Graves Disease. Endo c/s  -> Free thyroxine 1.0, subclinical as per endo. No Amiodarone 2/2 hyperthyroidism.  EP followed for new onset afib in 07/2024 2/2 hyperthyroidism. No amiodarone 2/2 Hyperthyroid as per EP.  On Primacor gtt. Medication regimen maintained. Start betablocker, 9/15  Pt instructed that Thyroid nodule needs FNA biopsy, can be done outpatient Subclinical now, not in thyrotoxic state  Today he presents and reports that he has shortness of breath with walking. Denies any chest pain, palpitations,, Dizziness or pedal edema.    Today on exam bilateral lung fields are clear, no wheezing or rales, no use of accessory muscles noted or respiratory distress, normal sinus rhythm, sternum stable, incision clean, dry and intact. No peripheral edema noted. Sutures removed today.    Instructed patient on importance of optimal glycemic control, daily showering, daily weights, any signs of fever (temperature greater than 101F, chills,  incentive spirometer use, and increase ambulation as tolerated. Instructed to call office with any signs or symptoms of infection or weight gain of 2 or more pounds in 1 day or 3 or more pounds in 1 week.    Discussed intake of plant based foods, including vegetables, fruits, and whole grain foods: legumes, nuts and seeds, fish or seafood, lean meats, and non-fat or low-fat diary foods. Plant based oils (non-tropical) in place of solid fats. Instructed patient to limit intake of high fat meats and processed meats, high-fat diary foods, dietary cholesterol and sodium, foods and beverages with added sugars.  *********************************************************************************************************************************************************************************************************************************************************************************************************************************************************************************************************   This is a 48-year-old male h/o new-onset atrial fibrillation this past July, found to be hyperthyroid [TSH <0.01] and found to have >5mm of growth in his known aortic root aneurysm [now 53mm] w/mild to moderate central  aortic insufficiency who underwent an aortic root replacement [Alberto V procedure], Encompass ablation, LAAL [Atricure 45mm clip] on 9/10/2024. He did extremely well w/an uneventful post-operative course and was discharged on POD 5 [9/15/2024].   - We are extremely pleased with the functional recovery of Mr. Morrissey. His exercise capacity has continued to improve, his pain has almost resolved completely, and his appetite is gradually improving. His incision look pristine. He is breathing well at rest and on exertion. He has no murmur and no lower extremity edema. He also remains in sinus rhythm.  1. We will see Mr. Morrissey back in clinic in one month for a follow up visit then we will see him back with a CTA of the chest / abdomen / pelvis in 6 months. If stable, he will enter into our Aortic Surveillance Registry and be seen yearly thereafter with a CTA of the chest.  2. Mr. Morrissey just moved to [place], although will still be working in the [place] area. He knows that he needs to establish care via a PCP and Cardiologist in [place]. However, we will continue to get a TTE to monitor his valve annually as well as the CTA of his chest during our routine visits to limit his trips to once per year.   3. He has remained in sinus rhythm since his initial hospitalization in July; his LAAL is clipped and he has been ablated, I think it is safe that he remains of anticoagulation. Mr. Morrissey agrees with this plan.   4. He should continue to increase his activity and walk daily as tolerated. Continue to use incentive spirometer.  5. He knows to call our office if he experiences any experience fever, fatigue, dizziness, confusion, syncope, shortness of breath, chest pain not relieved with analgesics, increased redness/drainage from the surgical incision site

## 2024-09-27 NOTE — CONSULT LETTER
[Dear  ___] : Dear  [unfilled], [Courtesy Letter:] : I had the pleasure of seeing your patient, [unfilled], in my office today. [Please see my note below.] : Please see my note below. [Consult Closing:] : Thank you very much for allowing me to participate in the care of this patient.  If you have any questions, please do not hesitate to contact me. [Sincerely,] : Sincerely, [FreeTextEntry3] :  Dr. Adam Serrano Attending Surgeon Department of Cardiovascular and Thoracic surgery [address] [place] [address] Tel: [phone]

## 2024-09-27 NOTE — ASSESSMENT
[FreeTextEntry1] : 48 year old male with PMH of known aortic root dilatation / aortic dilatation Increased to 5.6 cm from 4.8 in 2019 and new onset atrial fibrillation hospitalization on 7/2024 was on amiodarone and Eliquis (stopped as per Dr. Serrano as per pt, only on metoprolol), newly  diagnosed hyperthyroidism 7/2024/ Graves Disease not on meds ( asymptomatic/ saw endo 7/30/2024 pending work ups after heart surgery as per pt), high prolactin level 7/2024 followed by Neurology ( pending open air MRI 2/2 claustrophobia and further work up after open heart surgery as per pt), planned for Valve sparing Aortic valve replacement , replacement of Ascending aorta, Transverse Hemiarch on 9/10/2024 w/Dr. Serrano.    9/10 s/p Valve sparing root AA replacement, Encompass ablation, NORIS ligation. 9/11  Neuro c/s for HA -> rec open outpatient MRI 2/2 severe claustrophobia. Endo following for Graves Disease. Endo c/s  -> Free thyroxine 1.0, subclinical as per endo. No Amiodarone 2/2 hyperthyroidism.  EP followed for new onset afib in 07/2024 2/2 hyperthyroidism. No amiodarone 2/2 Hyperthyroid as per EP.  On Primacor gtt. Medication regimen maintained. Start betablocker, 9/15  Pt instructed that Thyroid nodule needs FNA biopsy, can be done outpatient Subclinical now, not in thyrotoxic state  Today he presents and reports that he has shortness of breath with walking. Denies any chest pain, palpitations,, Dizziness or pedal edema.    Today on exam bilateral lung fields are clear, no wheezing or rales, no use of accessory muscles noted or respiratory distress, normal sinus rhythm, sternum stable, incision clean, dry and intact. No peripheral edema noted. Sutures removed today.    Instructed patient on importance of optimal glycemic control, daily showering, daily weights, any signs of fever (temperature greater than 101F, chills,  incentive spirometer use, and increase ambulation as tolerated. Instructed to call office with any signs or symptoms of infection or weight gain of 2 or more pounds in 1 day or 3 or more pounds in 1 week.    Discussed intake of plant based foods, including vegetables, fruits, and whole grain foods: legumes, nuts and seeds, fish or seafood, lean meats, and non-fat or low-fat diary foods. Plant based oils (non-tropical) in place of solid fats. Instructed patient to limit intake of high fat meats and processed meats, high-fat diary foods, dietary cholesterol and sodium, foods and beverages with added sugars.  *********************************************************************************************************************************************************************************************************************************************************************************************************************************************************************************************************   This is a 48-year-old male h/o new-onset atrial fibrillation this past July, found to be hyperthyroid [TSH <0.01] and found to have >5mm of growth in his known aortic root aneurysm [now 53mm] w/mild to moderate central  aortic insufficiency who underwent an aortic root replacement [Alberto V procedure], Encompass ablation, LAAL [Atricure 45mm clip] on 9/10/2024. He did extremely well w/an uneventful post-operative course and was discharged on POD 5 [9/15/2024].   - We are extremely pleased with the functional recovery of Mr. Morrissey. His exercise capacity has continued to improve, his pain has almost resolved completely, and his appetite is gradually improving. His incision look pristine. He is breathing well at rest and on exertion. He has no murmur and no lower extremity edema. He also remains in sinus rhythm.  1. We will see Mr. Morrissey back in clinic in one month for a follow up visit then we will see him back with a CTA of the chest / abdomen / pelvis in 6 months. If stable, he will enter into our Aortic Surveillance Registry and be seen yearly thereafter with a CTA of the chest.  2. Mr. Morrissey just moved to [place], although will still be working in the [place] area. He knows that he needs to establish care via a PCP and Cardiologist in [place]. However, we will continue to get a TTE to monitor his valve annually as well as the CTA of his chest during our routine visits to limit his trips to once per year.   3. He has remained in sinus rhythm since his initial hospitalization in July; his LAAL is clipped and he has been ablated, I think it is safe that he remains of anticoagulation. Mr. Morrissey agrees with this plan.   4. He should continue to increase his activity and walk daily as tolerated. Continue to use incentive spirometer.  5. He knows to call our office if he experiences any experience fever, fatigue, dizziness, confusion, syncope, shortness of breath, chest pain not relieved with analgesics, increased redness/drainage from the surgical incision site

## 2024-09-27 NOTE — REASON FOR VISIT
[de-identified] :  Valve-sparing aortic root replacement (Alberto V procedure) using 28mm Terumo Valsalva graft with direct reimplantation of coronary buttons for aortic root  aneurysm               Pulmonary vein isolation, left atrial 'Box lesion' using the Atricure EnCompass clamp               Left atrial appendage exclusion using Atricure 45mm AtriClip [de-identified] : 9/10/24

## 2024-09-27 NOTE — CONSULT LETTER
[Dear  ___] : Dear  [unfilled], [Courtesy Letter:] : I had the pleasure of seeing your patient, [unfilled], in my office today. [Please see my note below.] : Please see my note below. [Consult Closing:] : Thank you very much for allowing me to participate in the care of this patient.  If you have any questions, please do not hesitate to contact me. [Sincerely,] : Sincerely, [FreeTextEntry3] :  Dr. Adam Serrano Attending Surgeon Department of Cardiovascular and Thoracic surgery [address] [place] [address] Tel: [phone]

## 2024-10-24 ENCOUNTER — APPOINTMENT (OUTPATIENT)
Dept: CARDIOTHORACIC SURGERY | Facility: CLINIC | Age: 49
End: 2024-10-24

## 2024-10-24 DIAGNOSIS — Z98.890 OTHER SPECIFIED POSTPROCEDURAL STATES: ICD-10-CM

## 2024-10-24 DIAGNOSIS — Z86.79 OTHER SPECIFIED POSTPROCEDURAL STATES: ICD-10-CM

## 2024-10-24 DIAGNOSIS — Z09 ENCOUNTER FOR FOLLOW-UP EXAMINATION AFTER COMPLETED TREATMENT FOR CONDITIONS OTHER THAN MALIGNANT NEOPLASM: ICD-10-CM

## 2024-11-07 ENCOUNTER — APPOINTMENT (OUTPATIENT)
Dept: CARDIOTHORACIC SURGERY | Facility: CLINIC | Age: 49
End: 2024-11-07
Payer: COMMERCIAL

## 2024-11-07 VITALS
WEIGHT: 245 LBS | SYSTOLIC BLOOD PRESSURE: 145 MMHG | BODY MASS INDEX: 28.35 KG/M2 | OXYGEN SATURATION: 97 % | RESPIRATION RATE: 16 BRPM | TEMPERATURE: 98.7 F | HEIGHT: 78 IN | HEART RATE: 91 BPM | DIASTOLIC BLOOD PRESSURE: 92 MMHG

## 2024-11-07 DIAGNOSIS — Z09 ENCOUNTER FOR FOLLOW-UP EXAMINATION AFTER COMPLETED TREATMENT FOR CONDITIONS OTHER THAN MALIGNANT NEOPLASM: ICD-10-CM

## 2024-11-07 DIAGNOSIS — Z86.79 OTHER SPECIFIED POSTPROCEDURAL STATES: ICD-10-CM

## 2024-11-07 DIAGNOSIS — Z98.890 OTHER SPECIFIED POSTPROCEDURAL STATES: ICD-10-CM

## 2024-11-07 PROCEDURE — 99024 POSTOP FOLLOW-UP VISIT: CPT

## 2025-07-17 NOTE — H&P PST ADULT - AS BP NONINV METHOD
Problem: Discharge Planning  Goal: Discharge to home or other facility with appropriate resources  7/17/2025 1014 by Izabela Workman RN  Outcome: Adequate for Discharge  7/16/2025 2219 by Rut Solorio GN  Outcome: Progressing  Flowsheets (Taken 7/16/2025 1845 by Maddi Shah, RN)  Discharge to home or other facility with appropriate resources: Identify barriers to discharge with patient and caregiver     Problem: Safety - Adult  Goal: Free from fall injury  7/17/2025 1014 by Izabela Workman RN  Outcome: Adequate for Discharge  7/16/2025 2219 by Rut Solorio GN  Outcome: Progressing  Flowsheets (Taken 7/16/2025 2219)  Free From Fall Injury: Instruct family/caregiver on patient safety     Problem: ABCDS Injury Assessment  Goal: Absence of physical injury  7/17/2025 1014 by Izabela Workman RN  Outcome: Adequate for Discharge  7/16/2025 2219 by Rut Solorio GN  Outcome: Progressing  Flowsheets (Taken 7/16/2025 2219)  Absence of Physical Injury: Implement safety measures based on patient assessment     Problem: Skin/Tissue Integrity  Goal: Skin integrity remains intact  Description: 1.  Monitor for areas of redness and/or skin breakdown  2.  Assess vascular access sites hourly  3.  Every 4-6 hours minimum:  Change oxygen saturation probe site  4.  Every 4-6 hours:  If on nasal continuous positive airway pressure, respiratory therapy assess nares and determine need for appliance change or resting period  7/17/2025 1014 by Izabela Workman RN  Outcome: Adequate for Discharge  7/16/2025 2219 by Rut Solorio GN  Outcome: Progressing  Flowsheets  Taken 7/16/2025 2219 by Rut Solorio GN  Skin Integrity Remains Intact:   Monitor for areas of redness and/or skin breakdown   Turn and reposition as indicated  Taken 7/16/2025 1845 by Maddi Shah, RN  Skin Integrity Remains Intact: Monitor for areas of redness and/or skin breakdown     Problem: Nutrition Deficit:  Goal: Optimize nutritional  electronic

## (undated) DEVICE — ELCTR BVI ACCU-TEMP CAUTERY SHAFT FINE TIP 1/2"

## (undated) DEVICE — ADAPTER "Y" RECIRCULATING MALE 1 LEG, WHITE CLAMP W COLOR CODED ARROWS 8"

## (undated) DEVICE — FEEDING TUBE NG SUMP 16FR 48"

## (undated) DEVICE — ELCTR BOVIE TIP BLADE INSULATED 4" EDGE

## (undated) DEVICE — POSITIONER FOAM EGG CRATE ULNAR 2PCS (PINK)

## (undated) DEVICE — DEVICE CLAMP ENCOMPASS SYNERGY ISOLATOR

## (undated) DEVICE — SUT PROLENE 5-0 30" RB-2

## (undated) DEVICE — VESSEL LOOP ASPEN SUPERMAXI BLUE

## (undated) DEVICE — PACK CARDIAC YELLOW

## (undated) DEVICE — DRSG DERMABOND PRINEO 22CM

## (undated) DEVICE — VENTING ADAPTER "Y" (RED/BLUE) 7.5"

## (undated) DEVICE — DRSG DERMABOND PRINEO 60CM

## (undated) DEVICE — SAW BLADE MICROAIRE STERNUM 1X34X9.4MM

## (undated) DEVICE — DRAPE IOBAN 23" X 23"

## (undated) DEVICE — DRAPE TOWEL BLUE 17" X 24"

## (undated) DEVICE — SUT PLEDGET PRE PUNCH 4.8 X 9.5 X 1.5 MM

## (undated) DEVICE — BLADE SCALPEL SAFETYLOCK #15

## (undated) DEVICE — SUT PROLENE 3-0 36" SH

## (undated) DEVICE — TOURNIQUET SET TOURNIKWIK 12FR (4 TUBES, 1 SNARE) 7.5"

## (undated) DEVICE — GUIDE SELECTION FOR STUDY ONLY G180173 F/ATRICLIP LAA SYS

## (undated) DEVICE — NDL COUNTER FOAM AND MAGNET 40-70

## (undated) DEVICE — SUT PROLENE 7-0 30" BV-1

## (undated) DEVICE — MARKING PEN W RULER

## (undated) DEVICE — SOL IRR POUR NS 0.9% 500ML

## (undated) DEVICE — MULTIPLE PERFUSION SET FEMALE 1 INLET LEG W 4 LEGS 15" (BLUE/RED)

## (undated) DEVICE — ELCTR BOVIE TIP BLADE INSULATED 2.75" EDGE

## (undated) DEVICE — VESSEL LOOP EXTRA MAXI-BLUE 0.200" X 22"

## (undated) DEVICE — SUT ETHIBOND 3-0 36" RB-1

## (undated) DEVICE — SUT MONOCRYL 4-0 18" PS-2

## (undated) DEVICE — ADAPTOR DLP "Y" FEMALE ON SINGLE LEG 3.5" X 10"

## (undated) DEVICE — Device

## (undated) DEVICE — TUBING ATS SUCTION LINE

## (undated) DEVICE — DRSG STERISTRIPS 0.5 X 4"

## (undated) DEVICE — LOOP VASC MINI LF 18IN YLW

## (undated) DEVICE — SUT TICRON 2-0 30" CV-305 DA

## (undated) DEVICE — LAP PAD 18 X 18"

## (undated) DEVICE — DRSG TEGADERM 4X4.75"

## (undated) DEVICE — SUT DOUBLE 6 WIRE STERNAL

## (undated) DEVICE — PACING CABLE (BLUE) ATRIAL TEMP SCREW DOWN 12FT

## (undated) DEVICE — STOPCOCK 4-WAY W SWIVEL MALE LUER LOCK NON VENTED RED CAP

## (undated) DEVICE — CATH IV SAFE BC 20G X 1.16" (PINK)

## (undated) DEVICE — GOWN TRIMAX LG

## (undated) DEVICE — CHEST DRAIN PLEUR-EVAC WET/WET ADULT-PEDS SINGLE (QUICK)

## (undated) DEVICE — SPECIMEN CONTAINER 100ML

## (undated) DEVICE — SENSOR MYOCARDIAL TEMP 15MM

## (undated) DEVICE — SUT PLEDGET SOFT LARGE 3/8" X 3/16" X 1/16" X6

## (undated) DEVICE — DRSG OPSITE 13.75 X 4"

## (undated) DEVICE — CONNECTOR STRAIGHT 1/4 X 3/8"

## (undated) DEVICE — DRAPE SLUSH / WARMER 44 X 66"

## (undated) DEVICE — DRAPE LIGHT HANDLE COVER (BLUE)

## (undated) DEVICE — DRAPE MAYO STAND 30"

## (undated) DEVICE — SUT VICRYL 2-0 27" CT-1 UNDYED

## (undated) DEVICE — SOL IRR NS 0.9% 250ML

## (undated) DEVICE — SPONGE PEANUT AUTO COUNT

## (undated) DEVICE — SUT PROLENE 4-0 36" RB-1

## (undated) DEVICE — SUCTION YANKAUER BULBOUS TIP W VENT

## (undated) DEVICE — TOURNIQUET SET 12FR (1 RED, 1 BLUE, 1 SNARE) 7"

## (undated) DEVICE — SUT TICRON 2-0 36" CV-316 DA

## (undated) DEVICE — DRSG OPSITE 2.5 X 2"

## (undated) DEVICE — SOL NORMOSOL-R PH7.4 1000ML

## (undated) DEVICE — CONNECTOR "Y" 1/4 X 1/4 X 1/4"

## (undated) DEVICE — PAD NERVE PHRENIC NERVE

## (undated) DEVICE — SUT SILK 5-0 60" TIES

## (undated) DEVICE — ELCTR BOVIE TIP BLADE VALLEYLAB 6.5"

## (undated) DEVICE — CONNECTOR "Y" 3/8 X 1/4 X 3/8"

## (undated) DEVICE — SUT PLEDGET 9MM X 4MM X 1.5MM

## (undated) DEVICE — TUBING TUR 2 PRONG

## (undated) DEVICE — FOLEY TRAY 16FR 5CC LF LUBRISIL ADVANCE TEMP CLOSED

## (undated) DEVICE — BLADE SCALPEL SAFETYLOCK #11

## (undated) DEVICE — PACING CABLE (BROWN) A/V TEMP SCREW DOWN 12FT

## (undated) DEVICE — WARMING BLANKET FULL UNDERBODY ADULT

## (undated) DEVICE — SUT BOOT STANDARD (ASSORTED) 5 PAIR

## (undated) DEVICE — SUT BOOT STANDARD (YELLOW) 5 PAIR

## (undated) DEVICE — DRAIN CHANNEL 32FR ROUND HUBLESS FULL FLUTED

## (undated) DEVICE — SUT BIOSYN 4-0 27" P-12

## (undated) DEVICE — ELCTR BOVIE TIP BLADE MEGADYNE E-Z CLEAN 2.5" (SHORT)

## (undated) DEVICE — SAW BLADE STRYKER STERNUM 31MM X 6.27 X .79

## (undated) DEVICE — DRAIN RESERVOIR FOR JACKSON PRATT 100CC CARDINAL

## (undated) DEVICE — SET PERF CARDIOPLEGIA 4 ARM STRL

## (undated) DEVICE — GOWN TRIMAX XXL

## (undated) DEVICE — PREP CHLORAPREP HI-LITE ORANGE 26ML

## (undated) DEVICE — ELCTR CAUTERY 2" LOOP TIP 2200 DEG FAHRENHEIT

## (undated) DEVICE — VISITEC 4X4

## (undated) DEVICE — SUT ETHIBOND EXCEL 2-0 30" SH-1

## (undated) DEVICE — POSITIONER CARDIAC BUMP

## (undated) DEVICE — SUCTION YANKAUER NO CONTROL VENT

## (undated) DEVICE — SUT SOFSILK 0 30" TIES

## (undated) DEVICE — SUT VICRYL 0 36" CTX UNDYED

## (undated) DEVICE — SOL IRR BAG NS 0.9% 3000ML

## (undated) DEVICE — NDL HYPO SAFE 20G X 1.5" (YELLOW)

## (undated) DEVICE — CHEST DRAIN OASIS DRY SUCTION WATER SEAL

## (undated) DEVICE — SUMP PERICARDIAL 20FR 1/4" ADULT

## (undated) DEVICE — SUMP INTRACARDIAC 20FR 1/4" ADULT

## (undated) DEVICE — TUBING TRUWAVE PRESSURE MALE/FEMALE 72"

## (undated) DEVICE — ELCTR BOVIE PENCIL HANDPIECE ROCKER SWITCH 15FT

## (undated) DEVICE — SUT TICRON 2-0 30" CV-305 DA W PLEDGET

## (undated) DEVICE — VESSEL LOOP MAXI-RED  0.120" X 16"

## (undated) DEVICE — PACING CABLE A/V TEMP SCREW DOWN 6FT

## (undated) DEVICE — SUT BLUNT SZ 5

## (undated) DEVICE — ADAPTER DLP MALE 1/4" X 2" (CLEAR) BARBED

## (undated) DEVICE — SUT ETHIBOND 2-0 36" SH

## (undated) DEVICE — SUT SILK 2-0 18" SH (POP-OFF)

## (undated) DEVICE — DRSG CURITY GAUZE SPONGE 4 X 4" 12-PLY

## (undated) DEVICE — SUT PROLENE 5-0 36" RB-1

## (undated) DEVICE — SPECIMEN CONTAINER 4OZ

## (undated) DEVICE — DRAPE IOBAN 33" X 23"

## (undated) DEVICE — SUT HOLDER INSERT FOR OCTOBASE STERNAL RETRACTOR

## (undated) DEVICE — VESSEL LOOP MINI-RED 0.7" X 16"

## (undated) DEVICE — DRAIN CHANNEL 19FR ROUND FULL FLUTED

## (undated) DEVICE — SUT SURGICAL STEEL 6 30" BP-1

## (undated) DEVICE — SUT PROLENE 4-0 36" SH

## (undated) DEVICE — GLV 7.5 PROTEXIS (BLUE)

## (undated) DEVICE — PACK UNIVERSAL CARDIAC

## (undated) DEVICE — BLADE SCALPEL SAFETYLOCK #10